# Patient Record
Sex: MALE | Race: BLACK OR AFRICAN AMERICAN | NOT HISPANIC OR LATINO | Employment: OTHER | URBAN - METROPOLITAN AREA
[De-identification: names, ages, dates, MRNs, and addresses within clinical notes are randomized per-mention and may not be internally consistent; named-entity substitution may affect disease eponyms.]

---

## 2019-02-07 ENCOUNTER — HOSPITAL ENCOUNTER (OUTPATIENT)
Dept: OTHER | Facility: HOSPITAL | Age: 64
Discharge: HOME | End: 2019-02-07
Attending: INTERNAL MEDICINE

## 2019-02-07 LAB — CONV HIV-1/ HIV-2: NEGATIVE

## 2019-03-05 ENCOUNTER — HOSPITAL ENCOUNTER (OUTPATIENT)
Dept: CT IMAGING | Facility: HOSPITAL | Age: 64
Discharge: HOME | End: 2019-03-05
Attending: INTERNAL MEDICINE

## 2019-03-05 LAB
CREAT BLD-MCNC: 0.6 MG/DL (ref 0.6–1.4)
GFR SERPLBLD BASED ON 1.73 SQ M-ARVRAT: >60 ML/MIN/{1.73_M2}

## 2019-03-14 ENCOUNTER — CONVERSION ENCOUNTER (OUTPATIENT)
Dept: GASTROENTEROLOGY | Facility: CLINIC | Age: 64
End: 2019-03-14

## 2019-03-14 ENCOUNTER — OFFICE VISIT CONVERTED (OUTPATIENT)
Dept: GASTROENTEROLOGY | Facility: CLINIC | Age: 64
End: 2019-03-14
Attending: NURSE PRACTITIONER

## 2019-03-22 ENCOUNTER — HOSPITAL ENCOUNTER (OUTPATIENT)
Dept: GASTROENTEROLOGY | Facility: HOSPITAL | Age: 64
Setting detail: HOSPITAL OUTPATIENT SURGERY
Discharge: HOME | End: 2019-03-22
Attending: INTERNAL MEDICINE

## 2019-05-09 ENCOUNTER — HOSPITAL ENCOUNTER (OUTPATIENT)
Dept: GASTROENTEROLOGY | Facility: HOSPITAL | Age: 64
Setting detail: HOSPITAL OUTPATIENT SURGERY
Discharge: HOME | End: 2019-05-09
Attending: INTERNAL MEDICINE

## 2019-08-15 ENCOUNTER — OFFICE VISIT CONVERTED (OUTPATIENT)
Dept: GASTROENTEROLOGY | Facility: CLINIC | Age: 64
End: 2019-08-15
Attending: NURSE PRACTITIONER

## 2019-08-22 ENCOUNTER — HOSPITAL ENCOUNTER (OUTPATIENT)
Dept: ULTRASOUND IMAGING | Facility: HOSPITAL | Age: 64
Discharge: HOME | End: 2019-08-22
Attending: NURSE PRACTITIONER

## 2019-08-22 LAB
FERRITIN SERPL-MCNC: 1020 NG/ML (ref 30–300)
INR PPP: 1.17 (ref 2–3)
IRON SATN MFR SERPL: 9 % (ref 20–55)
IRON SERPL-MCNC: 23 UG/DL (ref 70–180)
PROTHROMBIN TIME: 11.9 S (ref 9.4–12)
TIBC SERPL-MCNC: 266 UG/DL (ref 245–450)
TRANSFERRIN SERPL-MCNC: 186 MG/DL (ref 215–365)

## 2019-08-23 LAB
CONV HEPATITIS B SURFACE AG W CONFIRMATION RE: NEGATIVE
HAV IGM SERPL QL IA: NEGATIVE
HBV CORE IGM SERPL QL IA: NEGATIVE
HCV AB SER DONR QL: <0.1 S/CO RATIO (ref 0–0.9)

## 2019-09-05 ENCOUNTER — HOSPITAL ENCOUNTER (OUTPATIENT)
Dept: OTHER | Facility: HOSPITAL | Age: 64
Discharge: HOME | End: 2019-09-05
Attending: NURSE PRACTITIONER

## 2019-09-06 ENCOUNTER — OFFICE VISIT CONVERTED (OUTPATIENT)
Dept: OTOLARYNGOLOGY | Facility: CLINIC | Age: 64
End: 2019-09-06
Attending: OTOLARYNGOLOGY

## 2019-09-10 LAB — CONV HEMOCHROMATOSIS MUTATION (C282Y,H63D,565C): NORMAL

## 2019-11-21 ENCOUNTER — CONVERSION ENCOUNTER (OUTPATIENT)
Dept: GASTROENTEROLOGY | Facility: CLINIC | Age: 64
End: 2019-11-21

## 2019-11-21 ENCOUNTER — OFFICE VISIT CONVERTED (OUTPATIENT)
Dept: GASTROENTEROLOGY | Facility: CLINIC | Age: 64
End: 2019-11-21
Attending: NURSE PRACTITIONER

## 2020-02-04 ENCOUNTER — HOSPITAL ENCOUNTER (OUTPATIENT)
Dept: GASTROENTEROLOGY | Facility: HOSPITAL | Age: 65
Setting detail: HOSPITAL OUTPATIENT SURGERY
Discharge: HOME | End: 2020-02-04
Attending: INTERNAL MEDICINE

## 2020-12-31 ENCOUNTER — HOSPITAL ENCOUNTER (OUTPATIENT)
Dept: OTHER | Facility: HOSPITAL | Age: 65
Discharge: HOME | End: 2020-12-31
Attending: INTERNAL MEDICINE

## 2020-12-31 LAB
ALBUMIN SERPL-MCNC: 3 G/DL (ref 3.5–5)
ALBUMIN/GLOB SERPL: 0.6 {RATIO} (ref 1.4–2.6)
ALP SERPL-CCNC: 97 U/L (ref 56–155)
ALT SERPL-CCNC: 20 U/L (ref 10–40)
ANION GAP SERPL CALC-SCNC: 13 MMOL/L (ref 8–19)
AST SERPL-CCNC: 20 U/L (ref 15–50)
BASOPHILS # BLD AUTO: 0.05 10*3/UL (ref 0–0.2)
BASOPHILS NFR BLD AUTO: 0.4 % (ref 0–3)
BILIRUB SERPL-MCNC: 0.48 MG/DL (ref 0.2–1.3)
BUN SERPL-MCNC: 16 MG/DL (ref 5–25)
BUN/CREAT SERPL: 22 {RATIO} (ref 6–20)
CALCIUM SERPL-MCNC: 10.1 MG/DL (ref 8.7–10.4)
CHLORIDE SERPL-SCNC: 98 MMOL/L (ref 99–111)
CONV ABS IMM GRAN: 0.06 10*3/UL (ref 0–0.2)
CONV CO2: 26 MMOL/L (ref 22–32)
CONV IMMATURE GRAN: 0.5 % (ref 0–1.8)
CONV TOTAL PROTEIN: 8.1 G/DL (ref 6.3–8.2)
CREAT UR-MCNC: 0.73 MG/DL (ref 0.7–1.2)
DEPRECATED RDW RBC AUTO: 51.3 FL (ref 35.1–43.9)
EOSINOPHIL # BLD AUTO: 0.09 10*3/UL (ref 0–0.7)
EOSINOPHIL # BLD AUTO: 0.8 % (ref 0–7)
ERYTHROCYTE [DISTWIDTH] IN BLOOD BY AUTOMATED COUNT: 16.6 % (ref 11.6–14.4)
ERYTHROCYTE [SEDIMENTATION RATE] IN BLOOD: 87 MM/H (ref 0–20)
GFR SERPLBLD BASED ON 1.73 SQ M-ARVRAT: >60 ML/MIN/{1.73_M2}
GLOBULIN UR ELPH-MCNC: 5.1 G/DL (ref 2–3.5)
GLUCOSE SERPL-MCNC: 96 MG/DL (ref 70–99)
HCT VFR BLD AUTO: 36.2 % (ref 42–52)
HGB BLD-MCNC: 11.9 G/DL (ref 14–18)
LYMPHOCYTES # BLD AUTO: 2.28 10*3/UL (ref 1–5)
LYMPHOCYTES NFR BLD AUTO: 19.5 % (ref 20–45)
MCH RBC QN AUTO: 28.1 PG (ref 27–31)
MCHC RBC AUTO-ENTMCNC: 32.9 G/DL (ref 33–37)
MCV RBC AUTO: 85.4 FL (ref 80–96)
MONOCYTES # BLD AUTO: 0.18 10*3/UL (ref 0.2–1.2)
MONOCYTES NFR BLD AUTO: 1.5 % (ref 3–10)
NEUTROPHILS # BLD AUTO: 9.05 10*3/UL (ref 2–8)
NEUTROPHILS NFR BLD AUTO: 77.3 % (ref 30–85)
NRBC CBCN: 0 % (ref 0–0.7)
OSMOLALITY SERPL CALC.SUM OF ELEC: 277 MOSM/KG (ref 273–304)
PLATELET # BLD AUTO: 555 10*3/UL (ref 130–400)
PMV BLD AUTO: 8.4 FL (ref 9.4–12.4)
POTASSIUM SERPL-SCNC: 4.4 MMOL/L (ref 3.5–5.3)
RBC # BLD AUTO: 4.24 10*6/UL (ref 4.7–6.1)
SODIUM SERPL-SCNC: 133 MMOL/L (ref 135–147)
URATE SERPL-MCNC: 6.7 MG/DL (ref 3.5–8.5)
WBC # BLD AUTO: 11.71 10*3/UL (ref 4.8–10.8)

## 2021-01-05 LAB — CCP IGA+IGG SERPL IA-ACNC: 4 UNITS (ref 0–19)

## 2021-02-24 ENCOUNTER — HOSPITAL ENCOUNTER (OUTPATIENT)
Dept: LAB | Facility: HOSPITAL | Age: 66
Discharge: HOME | End: 2021-02-24
Attending: INTERNAL MEDICINE

## 2021-02-24 LAB
25(OH)D3 SERPL-MCNC: 49.4 NG/ML (ref 30–100)
ALBUMIN SERPL-MCNC: 2.7 G/DL (ref 3.5–5)
ALT SERPL-CCNC: 19 U/L (ref 10–40)
AST SERPL-CCNC: 29 U/L (ref 15–50)
BASOPHILS # BLD AUTO: 0.08 10*3/UL (ref 0–0.2)
BASOPHILS NFR BLD AUTO: 0.4 % (ref 0–3)
BUN SERPL-MCNC: 14 MG/DL (ref 5–25)
CONV ABS IMM GRAN: 0.23 10*3/UL (ref 0–0.2)
CONV IMMATURE GRAN: 1.1 % (ref 0–1.8)
CREAT UR-MCNC: 0.68 MG/DL (ref 0.7–1.2)
DEPRECATED RDW RBC AUTO: 51.3 FL (ref 35.1–43.9)
EOSINOPHIL # BLD AUTO: 0.08 10*3/UL (ref 0–0.7)
EOSINOPHIL # BLD AUTO: 0.4 % (ref 0–7)
ERYTHROCYTE [DISTWIDTH] IN BLOOD BY AUTOMATED COUNT: 15.8 % (ref 11.6–14.4)
ERYTHROCYTE [SEDIMENTATION RATE] IN BLOOD: 91 MM/H (ref 0–20)
HCT VFR BLD AUTO: 32.6 % (ref 42–52)
HGB BLD-MCNC: 10.4 G/DL (ref 14–18)
LYMPHOCYTES # BLD AUTO: 2.59 10*3/UL (ref 1–5)
LYMPHOCYTES NFR BLD AUTO: 12.5 % (ref 20–45)
MCH RBC QN AUTO: 28.3 PG (ref 27–31)
MCHC RBC AUTO-ENTMCNC: 31.9 G/DL (ref 33–37)
MCV RBC AUTO: 88.8 FL (ref 80–96)
MONOCYTES # BLD AUTO: 0.52 10*3/UL (ref 0.2–1.2)
MONOCYTES NFR BLD AUTO: 2.5 % (ref 3–10)
NEUTROPHILS # BLD AUTO: 17.17 10*3/UL (ref 2–8)
NEUTROPHILS NFR BLD AUTO: 83.1 % (ref 30–85)
NRBC CBCN: 0 % (ref 0–0.7)
PLATELET # BLD AUTO: 681 10*3/UL (ref 130–400)
PMV BLD AUTO: 8.8 FL (ref 9.4–12.4)
RBC # BLD AUTO: 3.67 10*6/UL (ref 4.7–6.1)
WBC # BLD AUTO: 20.67 10*3/UL (ref 4.8–10.8)

## 2021-03-02 LAB
CONV QUANTIFERON TB GOLD: NEGATIVE
QUANTIFERON CRITERIA: NORMAL
QUANTIFERON MITOGEN VALUE: 2.76 IU/ML
QUANTIFERON NIL VALUE: 0.02 IU/ML
QUANTIFERON TB1 AG VALUE: 0.03 IU/ML
QUANTIFERON TB2 AG VALUE: 0.02 IU/ML

## 2021-03-17 ENCOUNTER — HOSPITAL ENCOUNTER (OUTPATIENT)
Dept: VACCINE CLINIC | Facility: HOSPITAL | Age: 66
Discharge: HOME | End: 2021-03-17
Attending: INTERNAL MEDICINE

## 2021-04-07 ENCOUNTER — HOSPITAL ENCOUNTER (OUTPATIENT)
Dept: VACCINE CLINIC | Facility: HOSPITAL | Age: 66
Discharge: HOME | End: 2021-04-07
Attending: INTERNAL MEDICINE

## 2021-05-15 VITALS
SYSTOLIC BLOOD PRESSURE: 97 MMHG | WEIGHT: 122.25 LBS | HEIGHT: 68 IN | DIASTOLIC BLOOD PRESSURE: 66 MMHG | HEART RATE: 88 BPM | BODY MASS INDEX: 18.53 KG/M2

## 2021-05-15 VITALS
DIASTOLIC BLOOD PRESSURE: 63 MMHG | SYSTOLIC BLOOD PRESSURE: 104 MMHG | WEIGHT: 134 LBS | HEART RATE: 85 BPM | BODY MASS INDEX: 20.31 KG/M2 | HEIGHT: 68 IN

## 2021-05-15 VITALS
TEMPERATURE: 97.2 F | DIASTOLIC BLOOD PRESSURE: 70 MMHG | HEART RATE: 90 BPM | OXYGEN SATURATION: 98 % | WEIGHT: 119.5 LBS | HEIGHT: 68 IN | RESPIRATION RATE: 16 BRPM | BODY MASS INDEX: 18.11 KG/M2 | SYSTOLIC BLOOD PRESSURE: 100 MMHG

## 2021-05-16 VITALS — SYSTOLIC BLOOD PRESSURE: 101 MMHG | WEIGHT: 130 LBS | DIASTOLIC BLOOD PRESSURE: 59 MMHG | HEART RATE: 100 BPM

## 2021-05-17 ENCOUNTER — HOSPITAL ENCOUNTER (OUTPATIENT)
Dept: LAB | Facility: HOSPITAL | Age: 66
Discharge: HOME | End: 2021-05-17
Attending: INTERNAL MEDICINE

## 2021-05-17 LAB
ALBUMIN SERPL-MCNC: 2.9 G/DL (ref 3.5–5)
ALT SERPL-CCNC: 23 U/L (ref 10–40)
AST SERPL-CCNC: 28 U/L (ref 15–50)
BASOPHILS # BLD AUTO: 0.05 10*3/UL (ref 0–0.2)
BASOPHILS NFR BLD AUTO: 0.3 % (ref 0–3)
BUN SERPL-MCNC: 11 MG/DL (ref 5–25)
CONV ABS IMM GRAN: 0.14 10*3/UL (ref 0–0.2)
CONV IMMATURE GRAN: 0.9 % (ref 0–1.8)
CREAT UR-MCNC: 0.67 MG/DL (ref 0.7–1.2)
CRP SERPL-MCNC: 110.7 MG/L (ref 0–5)
DEPRECATED RDW RBC AUTO: 50.9 FL (ref 35.1–43.9)
EOSINOPHIL # BLD AUTO: 0.05 10*3/UL (ref 0–0.7)
EOSINOPHIL # BLD AUTO: 0.3 % (ref 0–7)
ERYTHROCYTE [DISTWIDTH] IN BLOOD BY AUTOMATED COUNT: 15.9 % (ref 11.6–14.4)
ERYTHROCYTE [SEDIMENTATION RATE] IN BLOOD: 100 MM/H (ref 0–20)
HCT VFR BLD AUTO: 29.9 % (ref 42–52)
HGB BLD-MCNC: 9.5 G/DL (ref 14–18)
LYMPHOCYTES # BLD AUTO: 3.89 10*3/UL (ref 1–5)
LYMPHOCYTES NFR BLD AUTO: 24.7 % (ref 20–45)
MCH RBC QN AUTO: 27.6 PG (ref 27–31)
MCHC RBC AUTO-ENTMCNC: 31.8 G/DL (ref 33–37)
MCV RBC AUTO: 86.9 FL (ref 80–96)
MONOCYTES # BLD AUTO: 0.39 10*3/UL (ref 0.2–1.2)
MONOCYTES NFR BLD AUTO: 2.5 % (ref 3–10)
NEUTROPHILS # BLD AUTO: 11.25 10*3/UL (ref 2–8)
NEUTROPHILS NFR BLD AUTO: 71.3 % (ref 30–85)
NRBC CBCN: 0 % (ref 0–0.7)
PLATELET # BLD AUTO: 664 10*3/UL (ref 130–400)
PMV BLD AUTO: 8.6 FL (ref 9.4–12.4)
RBC # BLD AUTO: 3.44 10*6/UL (ref 4.7–6.1)
WBC # BLD AUTO: 15.77 10*3/UL (ref 4.8–10.8)

## 2021-09-02 ENCOUNTER — TRANSCRIBE ORDERS (OUTPATIENT)
Dept: LAB | Facility: HOSPITAL | Age: 66
End: 2021-09-02

## 2021-09-02 ENCOUNTER — LAB (OUTPATIENT)
Dept: LAB | Facility: HOSPITAL | Age: 66
End: 2021-09-02

## 2021-09-02 DIAGNOSIS — Z79.899 ENCOUNTER FOR LONG-TERM (CURRENT) USE OF OTHER MEDICATIONS: ICD-10-CM

## 2021-09-02 DIAGNOSIS — M13.0 POLYARTHROPATHY: Primary | ICD-10-CM

## 2021-09-02 DIAGNOSIS — M13.0 POLYARTHROPATHY: ICD-10-CM

## 2021-09-02 LAB
ALT SERPL W P-5'-P-CCNC: 16 U/L (ref 1–41)
AST SERPL-CCNC: 15 U/L (ref 1–40)
BASOPHILS # BLD AUTO: 0.06 10*3/MM3 (ref 0–0.2)
BASOPHILS NFR BLD AUTO: 0.6 % (ref 0–1.5)
BUN SERPL-MCNC: 8 MG/DL (ref 8–23)
CREAT SERPL-MCNC: 0.66 MG/DL (ref 0.76–1.27)
CRP SERPL-MCNC: 9.23 MG/DL (ref 0.01–0.5)
DEPRECATED RDW RBC AUTO: 45.4 FL (ref 37–54)
EOSINOPHIL # BLD AUTO: 0.11 10*3/MM3 (ref 0–0.4)
EOSINOPHIL NFR BLD AUTO: 1.1 % (ref 0.3–6.2)
ERYTHROCYTE [DISTWIDTH] IN BLOOD BY AUTOMATED COUNT: 14.6 % (ref 12.3–15.4)
FERRITIN SERPL-MCNC: 561 NG/ML (ref 30–400)
FOLATE SERPL-MCNC: 10.7 NG/ML (ref 4.78–24.2)
GFR SERPL CREATININE-BSD FRML MDRD: 146 ML/MIN/1.73
HCT VFR BLD AUTO: 33.4 % (ref 37.5–51)
HGB BLD-MCNC: 11.2 G/DL (ref 13–17.7)
IMM GRANULOCYTES # BLD AUTO: 0.05 10*3/MM3 (ref 0–0.05)
IMM GRANULOCYTES NFR BLD AUTO: 0.5 % (ref 0–0.5)
LYMPHOCYTES # BLD AUTO: 2.22 10*3/MM3 (ref 0.7–3.1)
LYMPHOCYTES NFR BLD AUTO: 21.4 % (ref 19.6–45.3)
MCH RBC QN AUTO: 28.7 PG (ref 26.6–33)
MCHC RBC AUTO-ENTMCNC: 33.5 G/DL (ref 31.5–35.7)
MCV RBC AUTO: 85.6 FL (ref 79–97)
MONOCYTES # BLD AUTO: 0.42 10*3/MM3 (ref 0.1–0.9)
MONOCYTES NFR BLD AUTO: 4 % (ref 5–12)
NEUTROPHILS NFR BLD AUTO: 7.53 10*3/MM3 (ref 1.7–7)
NEUTROPHILS NFR BLD AUTO: 72.4 % (ref 42.7–76)
NRBC BLD AUTO-RTO: 0 /100 WBC (ref 0–0.2)
PLATELET # BLD AUTO: 571 10*3/MM3 (ref 140–450)
PMV BLD AUTO: 8.8 FL (ref 6–12)
RBC # BLD AUTO: 3.9 10*6/MM3 (ref 4.14–5.8)
URATE SERPL-MCNC: 7.2 MG/DL (ref 3.4–7)
VIT B12 BLD-MCNC: 729 PG/ML (ref 211–946)
WBC # BLD AUTO: 10.39 10*3/MM3 (ref 3.4–10.8)

## 2021-09-02 PROCEDURE — 84450 TRANSFERASE (AST) (SGOT): CPT

## 2021-09-02 PROCEDURE — 84550 ASSAY OF BLOOD/URIC ACID: CPT

## 2021-09-02 PROCEDURE — 82565 ASSAY OF CREATININE: CPT

## 2021-09-02 PROCEDURE — 85025 COMPLETE CBC W/AUTO DIFF WBC: CPT

## 2021-09-02 PROCEDURE — 86141 C-REACTIVE PROTEIN HS: CPT

## 2021-09-02 PROCEDURE — 82746 ASSAY OF FOLIC ACID SERUM: CPT

## 2021-09-02 PROCEDURE — 82728 ASSAY OF FERRITIN: CPT

## 2021-09-02 PROCEDURE — 84520 ASSAY OF UREA NITROGEN: CPT

## 2021-09-02 PROCEDURE — 36415 COLL VENOUS BLD VENIPUNCTURE: CPT

## 2021-09-02 PROCEDURE — 82607 VITAMIN B-12: CPT

## 2021-09-02 PROCEDURE — 84460 ALANINE AMINO (ALT) (SGPT): CPT

## 2022-01-04 ENCOUNTER — LAB (OUTPATIENT)
Dept: LAB | Facility: HOSPITAL | Age: 67
End: 2022-01-04

## 2022-01-04 ENCOUNTER — TRANSCRIBE ORDERS (OUTPATIENT)
Dept: LAB | Facility: HOSPITAL | Age: 67
End: 2022-01-04

## 2022-01-04 DIAGNOSIS — E55.9 VITAMIN D DEFICIENCY: ICD-10-CM

## 2022-01-04 DIAGNOSIS — M25.50 POLYARTHRALGIA: ICD-10-CM

## 2022-01-04 DIAGNOSIS — E55.9 VITAMIN D DEFICIENCY: Primary | ICD-10-CM

## 2022-01-04 DIAGNOSIS — M05.79 SEROPOSITIVE RHEUMATOID ARTHRITIS OF MULTIPLE SITES: ICD-10-CM

## 2022-01-04 DIAGNOSIS — Z79.899 ENCOUNTER FOR LONG-TERM (CURRENT) USE OF OTHER MEDICATIONS: ICD-10-CM

## 2022-01-04 LAB
25(OH)D3 SERPL-MCNC: 26.1 NG/ML
ALBUMIN SERPL-MCNC: 3.1 G/DL (ref 3.5–5.2)
ALT SERPL W P-5'-P-CCNC: 10 U/L (ref 1–41)
AST SERPL-CCNC: 14 U/L (ref 1–40)
BASOPHILS # BLD AUTO: 0.06 10*3/MM3 (ref 0–0.2)
BASOPHILS NFR BLD AUTO: 0.5 % (ref 0–1.5)
BUN SERPL-MCNC: 10 MG/DL (ref 8–23)
CREAT SERPL-MCNC: 0.62 MG/DL (ref 0.76–1.27)
DEPRECATED RDW RBC AUTO: 46.6 FL (ref 37–54)
EOSINOPHIL # BLD AUTO: 0.12 10*3/MM3 (ref 0–0.4)
EOSINOPHIL NFR BLD AUTO: 0.9 % (ref 0.3–6.2)
ERYTHROCYTE [DISTWIDTH] IN BLOOD BY AUTOMATED COUNT: 14.5 % (ref 12.3–15.4)
ERYTHROCYTE [SEDIMENTATION RATE] IN BLOOD: >130 MM/HR (ref 0–20)
GFR SERPL CREATININE-BSD FRML MDRD: >150 ML/MIN/1.73
HCT VFR BLD AUTO: 34.6 % (ref 37.5–51)
HGB BLD-MCNC: 11.3 G/DL (ref 13–17.7)
IMM GRANULOCYTES # BLD AUTO: 0.09 10*3/MM3 (ref 0–0.05)
IMM GRANULOCYTES NFR BLD AUTO: 0.7 % (ref 0–0.5)
LYMPHOCYTES # BLD AUTO: 3.36 10*3/MM3 (ref 0.7–3.1)
LYMPHOCYTES NFR BLD AUTO: 25.4 % (ref 19.6–45.3)
MCH RBC QN AUTO: 29.1 PG (ref 26.6–33)
MCHC RBC AUTO-ENTMCNC: 32.7 G/DL (ref 31.5–35.7)
MCV RBC AUTO: 89.2 FL (ref 79–97)
MONOCYTES # BLD AUTO: 0.36 10*3/MM3 (ref 0.1–0.9)
MONOCYTES NFR BLD AUTO: 2.7 % (ref 5–12)
NEUTROPHILS NFR BLD AUTO: 69.8 % (ref 42.7–76)
NEUTROPHILS NFR BLD AUTO: 9.22 10*3/MM3 (ref 1.7–7)
NRBC BLD AUTO-RTO: 0 /100 WBC (ref 0–0.2)
PLATELET # BLD AUTO: 559 10*3/MM3 (ref 140–450)
PMV BLD AUTO: 9.1 FL (ref 6–12)
RBC # BLD AUTO: 3.88 10*6/MM3 (ref 4.14–5.8)
WBC NRBC COR # BLD: 13.21 10*3/MM3 (ref 3.4–10.8)

## 2022-01-04 PROCEDURE — 84520 ASSAY OF UREA NITROGEN: CPT

## 2022-01-04 PROCEDURE — 36415 COLL VENOUS BLD VENIPUNCTURE: CPT

## 2022-01-04 PROCEDURE — 82306 VITAMIN D 25 HYDROXY: CPT

## 2022-01-04 PROCEDURE — 82040 ASSAY OF SERUM ALBUMIN: CPT

## 2022-01-04 PROCEDURE — 85652 RBC SED RATE AUTOMATED: CPT

## 2022-01-04 PROCEDURE — 82565 ASSAY OF CREATININE: CPT

## 2022-01-04 PROCEDURE — 84460 ALANINE AMINO (ALT) (SGPT): CPT

## 2022-01-04 PROCEDURE — 85025 COMPLETE CBC W/AUTO DIFF WBC: CPT

## 2022-01-04 PROCEDURE — 84450 TRANSFERASE (AST) (SGOT): CPT

## 2022-03-26 ENCOUNTER — HOSPITAL ENCOUNTER (INPATIENT)
Facility: HOSPITAL | Age: 67
LOS: 5 days | Discharge: HOME | End: 2022-03-31
Attending: EMERGENCY MEDICINE | Admitting: INTERNAL MEDICINE

## 2022-03-26 ENCOUNTER — APPOINTMENT (OUTPATIENT)
Dept: GENERAL RADIOLOGY | Facility: HOSPITAL | Age: 67
End: 2022-03-26

## 2022-03-26 ENCOUNTER — APPOINTMENT (OUTPATIENT)
Dept: CT IMAGING | Facility: HOSPITAL | Age: 67
End: 2022-03-26

## 2022-03-26 DIAGNOSIS — I50.9 ACUTE ON CHRONIC CONGESTIVE HEART FAILURE, UNSPECIFIED HEART FAILURE TYPE: ICD-10-CM

## 2022-03-26 DIAGNOSIS — Z78.9 DECREASED ACTIVITIES OF DAILY LIVING (ADL): ICD-10-CM

## 2022-03-26 DIAGNOSIS — R10.13 EPIGASTRIC PAIN: ICD-10-CM

## 2022-03-26 DIAGNOSIS — R26.2 DIFFICULTY WALKING: ICD-10-CM

## 2022-03-26 DIAGNOSIS — R00.0 SINUS TACHYCARDIA SEEN ON CARDIAC MONITOR: ICD-10-CM

## 2022-03-26 DIAGNOSIS — J18.9 PNEUMONIA OF BOTH LUNGS DUE TO INFECTIOUS ORGANISM, UNSPECIFIED PART OF LUNG: ICD-10-CM

## 2022-03-26 DIAGNOSIS — R07.9 CHEST PAIN, UNSPECIFIED TYPE: Primary | ICD-10-CM

## 2022-03-26 DIAGNOSIS — R13.14 PHARYNGOESOPHAGEAL DYSPHAGIA: ICD-10-CM

## 2022-03-26 PROBLEM — J44.1 COPD EXACERBATION: Status: ACTIVE | Noted: 2022-03-26

## 2022-03-26 LAB
ALBUMIN SERPL-MCNC: 3 G/DL (ref 3.5–5.2)
ALBUMIN/GLOB SERPL: 0.6 G/DL
ALP SERPL-CCNC: 80 U/L (ref 39–117)
ALT SERPL W P-5'-P-CCNC: 6 U/L (ref 1–41)
ANION GAP SERPL CALCULATED.3IONS-SCNC: 10.8 MMOL/L (ref 5–15)
AST SERPL-CCNC: 13 U/L (ref 1–40)
BASOPHILS # BLD AUTO: 0.07 10*3/MM3 (ref 0–0.2)
BASOPHILS NFR BLD AUTO: 0.6 % (ref 0–1.5)
BILIRUB SERPL-MCNC: 0.3 MG/DL (ref 0–1.2)
BUN SERPL-MCNC: 7 MG/DL (ref 8–23)
BUN/CREAT SERPL: 8.9 (ref 7–25)
CALCIUM SPEC-SCNC: 9.1 MG/DL (ref 8.6–10.5)
CHLORIDE SERPL-SCNC: 101 MMOL/L (ref 98–107)
CO2 SERPL-SCNC: 22.2 MMOL/L (ref 22–29)
CREAT SERPL-MCNC: 0.79 MG/DL (ref 0.76–1.27)
D-LACTATE SERPL-SCNC: 1.8 MMOL/L (ref 0.5–2)
D-LACTATE SERPL-SCNC: 2.4 MMOL/L (ref 0.5–2)
DEPRECATED RDW RBC AUTO: 52.8 FL (ref 37–54)
EGFRCR SERPLBLD CKD-EPI 2021: 97.4 ML/MIN/1.73
EOSINOPHIL # BLD AUTO: 0.1 10*3/MM3 (ref 0–0.4)
EOSINOPHIL NFR BLD AUTO: 0.9 % (ref 0.3–6.2)
ERYTHROCYTE [DISTWIDTH] IN BLOOD BY AUTOMATED COUNT: 16 % (ref 12.3–15.4)
FLUAV AG NPH QL: NEGATIVE
FLUBV AG NPH QL IA: NEGATIVE
GLOBULIN UR ELPH-MCNC: 5.3 GM/DL
GLUCOSE SERPL-MCNC: 113 MG/DL (ref 65–99)
HCT VFR BLD AUTO: 30.8 % (ref 37.5–51)
HGB BLD-MCNC: 10.3 G/DL (ref 13–17.7)
HOLD SPECIMEN: NORMAL
HOLD SPECIMEN: NORMAL
IMM GRANULOCYTES # BLD AUTO: 0.07 10*3/MM3 (ref 0–0.05)
IMM GRANULOCYTES NFR BLD AUTO: 0.6 % (ref 0–0.5)
LIPASE SERPL-CCNC: 10 U/L (ref 13–60)
LYMPHOCYTES # BLD AUTO: 2.68 10*3/MM3 (ref 0.7–3.1)
LYMPHOCYTES NFR BLD AUTO: 23.1 % (ref 19.6–45.3)
MCH RBC QN AUTO: 30.6 PG (ref 26.6–33)
MCHC RBC AUTO-ENTMCNC: 33.4 G/DL (ref 31.5–35.7)
MCV RBC AUTO: 91.4 FL (ref 79–97)
MONOCYTES # BLD AUTO: 0.7 10*3/MM3 (ref 0.1–0.9)
MONOCYTES NFR BLD AUTO: 6 % (ref 5–12)
NEUTROPHILS NFR BLD AUTO: 68.8 % (ref 42.7–76)
NEUTROPHILS NFR BLD AUTO: 7.97 10*3/MM3 (ref 1.7–7)
NRBC BLD AUTO-RTO: 0 /100 WBC (ref 0–0.2)
NT-PROBNP SERPL-MCNC: 4319 PG/ML (ref 0–900)
PLATELET # BLD AUTO: 493 10*3/MM3 (ref 140–450)
PMV BLD AUTO: 8.3 FL (ref 6–12)
POTASSIUM SERPL-SCNC: 5 MMOL/L (ref 3.5–5.2)
PROT SERPL-MCNC: 8.3 G/DL (ref 6–8.5)
RBC # BLD AUTO: 3.37 10*6/MM3 (ref 4.14–5.8)
SARS-COV-2 RNA PNL SPEC NAA+PROBE: NOT DETECTED
SODIUM SERPL-SCNC: 134 MMOL/L (ref 136–145)
TROPONIN I SERPL-MCNC: 0.05 NG/ML (ref 0–0.6)
TROPONIN T SERPL-MCNC: 0.04 NG/ML (ref 0–0.03)
TROPONIN T SERPL-MCNC: 0.04 NG/ML (ref 0–0.03)
WBC NRBC COR # BLD: 11.59 10*3/MM3 (ref 3.4–10.8)
WHOLE BLOOD HOLD SPECIMEN: NORMAL
WHOLE BLOOD HOLD SPECIMEN: NORMAL

## 2022-03-26 PROCEDURE — 87070 CULTURE OTHR SPECIMN AEROBIC: CPT | Performed by: INTERNAL MEDICINE

## 2022-03-26 PROCEDURE — 87040 BLOOD CULTURE FOR BACTERIA: CPT | Performed by: EMERGENCY MEDICINE

## 2022-03-26 PROCEDURE — 99284 EMERGENCY DEPT VISIT MOD MDM: CPT

## 2022-03-26 PROCEDURE — 25010000002 CEFEPIME PER 500 MG: Performed by: EMERGENCY MEDICINE

## 2022-03-26 PROCEDURE — 83605 ASSAY OF LACTIC ACID: CPT | Performed by: EMERGENCY MEDICINE

## 2022-03-26 PROCEDURE — 93005 ELECTROCARDIOGRAM TRACING: CPT | Performed by: EMERGENCY MEDICINE

## 2022-03-26 PROCEDURE — 80053 COMPREHEN METABOLIC PANEL: CPT | Performed by: EMERGENCY MEDICINE

## 2022-03-26 PROCEDURE — 84484 ASSAY OF TROPONIN QUANT: CPT

## 2022-03-26 PROCEDURE — 84484 ASSAY OF TROPONIN QUANT: CPT | Performed by: EMERGENCY MEDICINE

## 2022-03-26 PROCEDURE — 25010000002 ENOXAPARIN PER 10 MG: Performed by: SPECIALIST

## 2022-03-26 PROCEDURE — 71250 CT THORAX DX C-: CPT

## 2022-03-26 PROCEDURE — 94640 AIRWAY INHALATION TREATMENT: CPT

## 2022-03-26 PROCEDURE — 74176 CT ABD & PELVIS W/O CONTRAST: CPT

## 2022-03-26 PROCEDURE — 94760 N-INVAS EAR/PLS OXIMETRY 1: CPT

## 2022-03-26 PROCEDURE — 93005 ELECTROCARDIOGRAM TRACING: CPT

## 2022-03-26 PROCEDURE — 94799 UNLISTED PULMONARY SVC/PX: CPT

## 2022-03-26 PROCEDURE — 71045 X-RAY EXAM CHEST 1 VIEW: CPT

## 2022-03-26 PROCEDURE — 93010 ELECTROCARDIOGRAM REPORT: CPT | Performed by: INTERNAL MEDICINE

## 2022-03-26 PROCEDURE — 36415 COLL VENOUS BLD VENIPUNCTURE: CPT

## 2022-03-26 PROCEDURE — 63710000001 PREDNISONE PER 1 MG: Performed by: INTERNAL MEDICINE

## 2022-03-26 PROCEDURE — 25010000002 VANCOMYCIN 5 G RECONSTITUTED SOLUTION: Performed by: EMERGENCY MEDICINE

## 2022-03-26 PROCEDURE — 83690 ASSAY OF LIPASE: CPT | Performed by: EMERGENCY MEDICINE

## 2022-03-26 PROCEDURE — 87205 SMEAR GRAM STAIN: CPT | Performed by: INTERNAL MEDICINE

## 2022-03-26 PROCEDURE — U0004 COV-19 TEST NON-CDC HGH THRU: HCPCS | Performed by: EMERGENCY MEDICINE

## 2022-03-26 PROCEDURE — 83880 ASSAY OF NATRIURETIC PEPTIDE: CPT | Performed by: EMERGENCY MEDICINE

## 2022-03-26 PROCEDURE — 85025 COMPLETE CBC W/AUTO DIFF WBC: CPT

## 2022-03-26 PROCEDURE — 87804 INFLUENZA ASSAY W/OPTIC: CPT | Performed by: INTERNAL MEDICINE

## 2022-03-26 RX ORDER — IPRATROPIUM BROMIDE AND ALBUTEROL SULFATE 2.5; .5 MG/3ML; MG/3ML
3 SOLUTION RESPIRATORY (INHALATION) EVERY 4 HOURS PRN
Status: DISCONTINUED | OUTPATIENT
Start: 2022-03-26 | End: 2022-03-31 | Stop reason: HOSPADM

## 2022-03-26 RX ORDER — GUAIFENESIN 600 MG/1
1200 TABLET, EXTENDED RELEASE ORAL EVERY 12 HOURS SCHEDULED
Status: DISCONTINUED | OUTPATIENT
Start: 2022-03-26 | End: 2022-03-31 | Stop reason: HOSPADM

## 2022-03-26 RX ORDER — BENZONATATE 100 MG/1
200 CAPSULE ORAL 3 TIMES DAILY PRN
Status: DISCONTINUED | OUTPATIENT
Start: 2022-03-26 | End: 2022-03-31 | Stop reason: HOSPADM

## 2022-03-26 RX ORDER — HYDROCODONE BITARTRATE AND ACETAMINOPHEN 5; 325 MG/1; MG/1
TABLET ORAL
COMMUNITY
End: 2022-03-26

## 2022-03-26 RX ORDER — PREDNISONE 20 MG/1
40 TABLET ORAL
Status: COMPLETED | OUTPATIENT
Start: 2022-03-26 | End: 2022-03-30

## 2022-03-26 RX ORDER — FOLIC ACID 1 MG/1
1 TABLET ORAL DAILY
Status: DISCONTINUED | OUTPATIENT
Start: 2022-03-27 | End: 2022-03-31 | Stop reason: HOSPADM

## 2022-03-26 RX ORDER — ASPIRIN 81 MG/1
81 TABLET ORAL DAILY
COMMUNITY

## 2022-03-26 RX ORDER — TRAZODONE HYDROCHLORIDE 50 MG/1
50 TABLET ORAL NIGHTLY PRN
Status: DISCONTINUED | OUTPATIENT
Start: 2022-03-26 | End: 2022-03-31 | Stop reason: HOSPADM

## 2022-03-26 RX ORDER — CEFEPIME 1 G/50ML
2 INJECTION, SOLUTION INTRAVENOUS ONCE
Status: COMPLETED | OUTPATIENT
Start: 2022-03-26 | End: 2022-03-26

## 2022-03-26 RX ORDER — SODIUM CHLORIDE 0.9 % (FLUSH) 0.9 %
10 SYRINGE (ML) INJECTION AS NEEDED
Status: DISCONTINUED | OUTPATIENT
Start: 2022-03-26 | End: 2022-03-26

## 2022-03-26 RX ORDER — FAMOTIDINE 20 MG/1
TABLET, FILM COATED ORAL EVERY 12 HOURS SCHEDULED
COMMUNITY
End: 2022-08-11

## 2022-03-26 RX ORDER — DOXYCYCLINE 100 MG/1
100 CAPSULE ORAL EVERY 12 HOURS SCHEDULED
Status: DISCONTINUED | OUTPATIENT
Start: 2022-03-26 | End: 2022-03-27

## 2022-03-26 RX ORDER — OXYCODONE AND ACETAMINOPHEN 7.5; 325 MG/1; MG/1
1 TABLET ORAL 2 TIMES DAILY PRN
Status: DISCONTINUED | OUTPATIENT
Start: 2022-03-26 | End: 2022-03-31 | Stop reason: HOSPADM

## 2022-03-26 RX ORDER — FAMOTIDINE 20 MG/1
20 TABLET, FILM COATED ORAL DAILY
Status: DISCONTINUED | OUTPATIENT
Start: 2022-03-27 | End: 2022-03-31 | Stop reason: HOSPADM

## 2022-03-26 RX ORDER — FOLIC ACID 1 MG/1
1 TABLET ORAL DAILY
COMMUNITY
Start: 2021-12-20

## 2022-03-26 RX ORDER — BUDESONIDE 0.5 MG/2ML
0.5 INHALANT ORAL
Status: DISCONTINUED | OUTPATIENT
Start: 2022-03-26 | End: 2022-03-31 | Stop reason: HOSPADM

## 2022-03-26 RX ORDER — LACTULOSE 10 G/15ML
30 SOLUTION ORAL
COMMUNITY
End: 2022-03-26

## 2022-03-26 RX ORDER — OXYCODONE AND ACETAMINOPHEN 7.5; 325 MG/1; MG/1
1 TABLET ORAL 2 TIMES DAILY PRN
COMMUNITY
Start: 2022-02-14

## 2022-03-26 RX ORDER — MIRTAZAPINE 15 MG/1
TABLET, FILM COATED ORAL
COMMUNITY
End: 2022-03-26

## 2022-03-26 RX ORDER — IPRATROPIUM BROMIDE AND ALBUTEROL SULFATE 2.5; .5 MG/3ML; MG/3ML
3 SOLUTION RESPIRATORY (INHALATION)
Status: DISCONTINUED | OUTPATIENT
Start: 2022-03-26 | End: 2022-03-31

## 2022-03-26 RX ORDER — ETANERCEPT 50 MG/ML
50 SOLUTION SUBCUTANEOUS
COMMUNITY

## 2022-03-26 RX ORDER — METOPROLOL SUCCINATE 25 MG/1
25 TABLET, EXTENDED RELEASE ORAL DAILY
Status: DISCONTINUED | OUTPATIENT
Start: 2022-03-27 | End: 2022-03-27

## 2022-03-26 RX ORDER — CEFTRIAXONE SODIUM 1 G/50ML
1 INJECTION, SOLUTION INTRAVENOUS EVERY 24 HOURS
Status: DISCONTINUED | OUTPATIENT
Start: 2022-03-27 | End: 2022-03-31 | Stop reason: HOSPADM

## 2022-03-26 RX ORDER — ARFORMOTEROL TARTRATE 15 UG/2ML
15 SOLUTION RESPIRATORY (INHALATION)
Status: DISCONTINUED | OUTPATIENT
Start: 2022-03-26 | End: 2022-03-31 | Stop reason: HOSPADM

## 2022-03-26 RX ORDER — TESTOSTERONE ENANTHATE 200 MG/ML
200 INJECTION, SOLUTION INTRAMUSCULAR
COMMUNITY

## 2022-03-26 RX ORDER — ASPIRIN 81 MG/1
81 TABLET ORAL DAILY
Status: DISCONTINUED | OUTPATIENT
Start: 2022-03-27 | End: 2022-03-31 | Stop reason: HOSPADM

## 2022-03-26 RX ORDER — SODIUM CHLORIDE 0.9 % (FLUSH) 0.9 %
10 SYRINGE (ML) INJECTION AS NEEDED
Status: DISCONTINUED | OUTPATIENT
Start: 2022-03-26 | End: 2022-03-27

## 2022-03-26 RX ORDER — ISOSORBIDE MONONITRATE 30 MG/1
TABLET, EXTENDED RELEASE ORAL
COMMUNITY
Start: 2022-02-15 | End: 2022-03-26

## 2022-03-26 RX ORDER — MELOXICAM 15 MG/1
TABLET ORAL
COMMUNITY
End: 2022-03-26

## 2022-03-26 RX ORDER — METOPROLOL SUCCINATE 25 MG/1
50 TABLET, EXTENDED RELEASE ORAL DAILY
Status: ON HOLD | COMMUNITY
End: 2022-09-23 | Stop reason: SDUPTHER

## 2022-03-26 RX ADMIN — BUDESONIDE 0.5 MG: 0.5 SUSPENSION RESPIRATORY (INHALATION) at 23:27

## 2022-03-26 RX ADMIN — GUAIFENESIN 1200 MG: 600 TABLET ORAL at 21:13

## 2022-03-26 RX ADMIN — SODIUM CHLORIDE 1000 ML: 9 INJECTION, SOLUTION INTRAVENOUS at 13:42

## 2022-03-26 RX ADMIN — IPRATROPIUM BROMIDE AND ALBUTEROL SULFATE 3 ML: 2.5; .5 SOLUTION RESPIRATORY (INHALATION) at 23:27

## 2022-03-26 RX ADMIN — PREDNISONE 40 MG: 20 TABLET ORAL at 21:12

## 2022-03-26 RX ADMIN — ARFORMOTEROL TARTRATE 15 MCG: 15 SOLUTION RESPIRATORY (INHALATION) at 23:27

## 2022-03-26 RX ADMIN — ENOXAPARIN SODIUM 60 MG: 60 INJECTION SUBCUTANEOUS at 21:13

## 2022-03-26 RX ADMIN — BENZONATATE 200 MG: 100 CAPSULE ORAL at 22:49

## 2022-03-26 RX ADMIN — CEFEPIME 2 G: 1 INJECTION, SOLUTION INTRAVENOUS at 13:43

## 2022-03-26 RX ADMIN — TRAZODONE HYDROCHLORIDE 50 MG: 50 TABLET ORAL at 22:49

## 2022-03-26 RX ADMIN — METOPROLOL TARTRATE 5 MG: 1 INJECTION, SOLUTION INTRAVENOUS at 16:17

## 2022-03-26 RX ADMIN — VANCOMYCIN HYDROCHLORIDE 1250 MG: 5 INJECTION, POWDER, LYOPHILIZED, FOR SOLUTION INTRAVENOUS at 14:32

## 2022-03-27 ENCOUNTER — APPOINTMENT (OUTPATIENT)
Dept: CARDIOLOGY | Facility: HOSPITAL | Age: 67
End: 2022-03-27

## 2022-03-27 LAB
ANION GAP SERPL CALCULATED.3IONS-SCNC: 9.8 MMOL/L (ref 5–15)
BACTERIA UR QL AUTO: ABNORMAL /HPF
BASOPHILS # BLD AUTO: 0.02 10*3/MM3 (ref 0–0.2)
BASOPHILS NFR BLD AUTO: 0.2 % (ref 0–1.5)
BH CV ECHO MEAS - AO ROOT DIAM: 4 CM
BH CV ECHO MEAS - EDV(MOD-SP2): 88 ML
BH CV ECHO MEAS - EDV(MOD-SP4): 87 ML
BH CV ECHO MEAS - EF(MOD-BP): 51.3 %
BH CV ECHO MEAS - ESV(MOD-SP2): 43 ML
BH CV ECHO MEAS - ESV(MOD-SP4): 43 ML
BH CV ECHO MEAS - IVSD: 1.1 CM
BH CV ECHO MEAS - LA DIMENSION(2D): 2.9 CM
BH CV ECHO MEAS - LAT PEAK E' VEL: 5.3 CM/SEC
BH CV ECHO MEAS - LVIDD: 4.8 CM
BH CV ECHO MEAS - LVIDS: 3.6 CM
BH CV ECHO MEAS - LVOT DIAM: 2 CM
BH CV ECHO MEAS - LVPWD: 1.1 CM
BH CV ECHO MEAS - MED PEAK E' VEL: 5.77 CM/SEC
BH CV ECHO MEAS - MV DEC TIME: 153 MSEC
BH CV ECHO MEAS - RVDD: 2.3 CM
BILIRUB UR QL STRIP: NEGATIVE
BUN SERPL-MCNC: 9 MG/DL (ref 8–23)
BUN/CREAT SERPL: 13.6 (ref 7–25)
CALCIUM SPEC-SCNC: 8.5 MG/DL (ref 8.6–10.5)
CHLORIDE SERPL-SCNC: 100 MMOL/L (ref 98–107)
CLARITY UR: CLEAR
CO2 SERPL-SCNC: 22.2 MMOL/L (ref 22–29)
COLOR UR: YELLOW
CREAT SERPL-MCNC: 0.66 MG/DL (ref 0.76–1.27)
DEPRECATED RDW RBC AUTO: 51.5 FL (ref 37–54)
EGFRCR SERPLBLD CKD-EPI 2021: 102.8 ML/MIN/1.73
EOSINOPHIL # BLD AUTO: 0 10*3/MM3 (ref 0–0.4)
EOSINOPHIL NFR BLD AUTO: 0 % (ref 0.3–6.2)
ERYTHROCYTE [DISTWIDTH] IN BLOOD BY AUTOMATED COUNT: 15.7 % (ref 12.3–15.4)
GLUCOSE SERPL-MCNC: 117 MG/DL (ref 65–99)
GLUCOSE UR STRIP-MCNC: NEGATIVE MG/DL
HCT VFR BLD AUTO: 27.9 % (ref 37.5–51)
HGB BLD-MCNC: 9.3 G/DL (ref 13–17.7)
HGB UR QL STRIP.AUTO: NEGATIVE
HYALINE CASTS UR QL AUTO: ABNORMAL /LPF
IMM GRANULOCYTES # BLD AUTO: 0.07 10*3/MM3 (ref 0–0.05)
IMM GRANULOCYTES NFR BLD AUTO: 0.6 % (ref 0–0.5)
IVRT: 74 MSEC
KETONES UR QL STRIP: ABNORMAL
L PNEUMO1 AG UR QL IA: NEGATIVE
LEFT ATRIUM VOLUME INDEX: 14.8 ML/M2
LEUKOCYTE ESTERASE UR QL STRIP.AUTO: ABNORMAL
LYMPHOCYTES # BLD AUTO: 1.01 10*3/MM3 (ref 0.7–3.1)
LYMPHOCYTES NFR BLD AUTO: 9.3 % (ref 19.6–45.3)
MAGNESIUM SERPL-MCNC: 1.9 MG/DL (ref 1.6–2.4)
MAXIMAL PREDICTED HEART RATE: 153 BPM
MCH RBC QN AUTO: 30.2 PG (ref 26.6–33)
MCHC RBC AUTO-ENTMCNC: 33.3 G/DL (ref 31.5–35.7)
MCV RBC AUTO: 90.6 FL (ref 79–97)
MONOCYTES # BLD AUTO: 0.11 10*3/MM3 (ref 0.1–0.9)
MONOCYTES NFR BLD AUTO: 1 % (ref 5–12)
MRSA DNA SPEC QL NAA+PROBE: NORMAL
NEUTROPHILS NFR BLD AUTO: 88.9 % (ref 42.7–76)
NEUTROPHILS NFR BLD AUTO: 9.67 10*3/MM3 (ref 1.7–7)
NITRITE UR QL STRIP: NEGATIVE
NRBC BLD AUTO-RTO: 0 /100 WBC (ref 0–0.2)
PH UR STRIP.AUTO: 5.5 [PH] (ref 5–8)
PLATELET # BLD AUTO: 406 10*3/MM3 (ref 140–450)
PMV BLD AUTO: 8.1 FL (ref 6–12)
POTASSIUM SERPL-SCNC: 4.2 MMOL/L (ref 3.5–5.2)
PROCALCITONIN SERPL-MCNC: 0.1 NG/ML (ref 0–0.25)
PROT UR QL STRIP: NEGATIVE
QT INTERVAL: 366 MS
RBC # BLD AUTO: 3.08 10*6/MM3 (ref 4.14–5.8)
RBC # UR STRIP: ABNORMAL /HPF
REF LAB TEST METHOD: ABNORMAL
S PNEUM AG SPEC QL LA: NEGATIVE
SODIUM SERPL-SCNC: 132 MMOL/L (ref 136–145)
SP GR UR STRIP: 1.01 (ref 1–1.03)
SQUAMOUS #/AREA URNS HPF: ABNORMAL /HPF
STRESS TARGET HR: 130 BPM
TROPONIN T SERPL-MCNC: 0.02 NG/ML (ref 0–0.03)
TSH SERPL DL<=0.05 MIU/L-ACNC: 0.4 UIU/ML (ref 0.27–4.2)
UROBILINOGEN UR QL STRIP: ABNORMAL
WBC # UR STRIP: ABNORMAL /HPF
WBC NRBC COR # BLD: 10.88 10*3/MM3 (ref 3.4–10.8)

## 2022-03-27 PROCEDURE — 87505 NFCT AGENT DETECTION GI: CPT | Performed by: INTERNAL MEDICINE

## 2022-03-27 PROCEDURE — 93306 TTE W/DOPPLER COMPLETE: CPT

## 2022-03-27 PROCEDURE — 84484 ASSAY OF TROPONIN QUANT: CPT | Performed by: SPECIALIST

## 2022-03-27 PROCEDURE — 80048 BASIC METABOLIC PNL TOTAL CA: CPT | Performed by: INTERNAL MEDICINE

## 2022-03-27 PROCEDURE — 87899 AGENT NOS ASSAY W/OPTIC: CPT | Performed by: INTERNAL MEDICINE

## 2022-03-27 PROCEDURE — 25010000002 FUROSEMIDE PER 20 MG: Performed by: INTERNAL MEDICINE

## 2022-03-27 PROCEDURE — 25010000002 ENOXAPARIN PER 10 MG: Performed by: SPECIALIST

## 2022-03-27 PROCEDURE — 84443 ASSAY THYROID STIM HORMONE: CPT | Performed by: INTERNAL MEDICINE

## 2022-03-27 PROCEDURE — 94799 UNLISTED PULMONARY SVC/PX: CPT

## 2022-03-27 PROCEDURE — 25010000002 CEFTRIAXONE PER 250 MG: Performed by: INTERNAL MEDICINE

## 2022-03-27 PROCEDURE — 99223 1ST HOSP IP/OBS HIGH 75: CPT | Performed by: INTERNAL MEDICINE

## 2022-03-27 PROCEDURE — 87641 MR-STAPH DNA AMP PROBE: CPT | Performed by: INTERNAL MEDICINE

## 2022-03-27 PROCEDURE — 83735 ASSAY OF MAGNESIUM: CPT | Performed by: INTERNAL MEDICINE

## 2022-03-27 PROCEDURE — 81001 URINALYSIS AUTO W/SCOPE: CPT | Performed by: INTERNAL MEDICINE

## 2022-03-27 PROCEDURE — 84145 PROCALCITONIN (PCT): CPT | Performed by: INTERNAL MEDICINE

## 2022-03-27 PROCEDURE — 85025 COMPLETE CBC W/AUTO DIFF WBC: CPT | Performed by: INTERNAL MEDICINE

## 2022-03-27 PROCEDURE — 63710000001 PREDNISONE PER 1 MG: Performed by: INTERNAL MEDICINE

## 2022-03-27 RX ORDER — FUROSEMIDE 10 MG/ML
20 INJECTION INTRAMUSCULAR; INTRAVENOUS 2 TIMES DAILY
Status: DISCONTINUED | OUTPATIENT
Start: 2022-03-27 | End: 2022-03-31 | Stop reason: HOSPADM

## 2022-03-27 RX ORDER — METOPROLOL SUCCINATE 25 MG/1
25 TABLET, EXTENDED RELEASE ORAL EVERY 12 HOURS SCHEDULED
Status: DISCONTINUED | OUTPATIENT
Start: 2022-03-27 | End: 2022-03-31 | Stop reason: HOSPADM

## 2022-03-27 RX ADMIN — IPRATROPIUM BROMIDE AND ALBUTEROL SULFATE 3 ML: 2.5; .5 SOLUTION RESPIRATORY (INHALATION) at 06:41

## 2022-03-27 RX ADMIN — METOPROLOL SUCCINATE 25 MG: 25 TABLET, EXTENDED RELEASE ORAL at 20:35

## 2022-03-27 RX ADMIN — IPRATROPIUM BROMIDE AND ALBUTEROL SULFATE 3 ML: 2.5; .5 SOLUTION RESPIRATORY (INHALATION) at 11:14

## 2022-03-27 RX ADMIN — ASPIRIN 81 MG: 81 TABLET, COATED ORAL at 10:07

## 2022-03-27 RX ADMIN — IPRATROPIUM BROMIDE AND ALBUTEROL SULFATE 3 ML: 2.5; .5 SOLUTION RESPIRATORY (INHALATION) at 03:39

## 2022-03-27 RX ADMIN — GUAIFENESIN 1200 MG: 600 TABLET ORAL at 10:15

## 2022-03-27 RX ADMIN — DOXYCYCLINE 100 MG: 100 CAPSULE ORAL at 01:02

## 2022-03-27 RX ADMIN — IPRATROPIUM BROMIDE AND ALBUTEROL SULFATE 3 ML: 2.5; .5 SOLUTION RESPIRATORY (INHALATION) at 20:17

## 2022-03-27 RX ADMIN — BUDESONIDE 0.5 MG: 0.5 SUSPENSION RESPIRATORY (INHALATION) at 06:41

## 2022-03-27 RX ADMIN — IPRATROPIUM BROMIDE AND ALBUTEROL SULFATE 3 ML: 2.5; .5 SOLUTION RESPIRATORY (INHALATION) at 15:19

## 2022-03-27 RX ADMIN — METOPROLOL SUCCINATE 25 MG: 25 TABLET, EXTENDED RELEASE ORAL at 10:07

## 2022-03-27 RX ADMIN — ARFORMOTEROL TARTRATE 15 MCG: 15 SOLUTION RESPIRATORY (INHALATION) at 20:17

## 2022-03-27 RX ADMIN — BUDESONIDE 0.5 MG: 0.5 SUSPENSION RESPIRATORY (INHALATION) at 20:17

## 2022-03-27 RX ADMIN — ENOXAPARIN SODIUM 60 MG: 60 INJECTION SUBCUTANEOUS at 10:15

## 2022-03-27 RX ADMIN — CEFTRIAXONE SODIUM 1 G: 1 INJECTION, SOLUTION INTRAVENOUS at 01:01

## 2022-03-27 RX ADMIN — FUROSEMIDE 20 MG: 10 INJECTION, SOLUTION INTRAMUSCULAR; INTRAVENOUS at 20:35

## 2022-03-27 RX ADMIN — FAMOTIDINE 20 MG: 20 TABLET ORAL at 10:07

## 2022-03-27 RX ADMIN — FUROSEMIDE 20 MG: 10 INJECTION, SOLUTION INTRAMUSCULAR; INTRAVENOUS at 10:08

## 2022-03-27 RX ADMIN — GUAIFENESIN 1200 MG: 600 TABLET ORAL at 20:35

## 2022-03-27 RX ADMIN — ARFORMOTEROL TARTRATE 15 MCG: 15 SOLUTION RESPIRATORY (INHALATION) at 06:41

## 2022-03-27 RX ADMIN — Medication 10 ML: at 10:08

## 2022-03-27 RX ADMIN — PREDNISONE 40 MG: 20 TABLET ORAL at 10:08

## 2022-03-27 RX ADMIN — FOLIC ACID 1 MG: 1 TABLET ORAL at 10:07

## 2022-03-27 RX ADMIN — ENOXAPARIN SODIUM 60 MG: 60 INJECTION SUBCUTANEOUS at 20:35

## 2022-03-28 ENCOUNTER — APPOINTMENT (OUTPATIENT)
Dept: GENERAL RADIOLOGY | Facility: HOSPITAL | Age: 67
End: 2022-03-28

## 2022-03-28 PROBLEM — R10.9 ABDOMINAL PAIN: Status: ACTIVE | Noted: 2022-03-28

## 2022-03-28 LAB
ANION GAP SERPL CALCULATED.3IONS-SCNC: 10.3 MMOL/L (ref 5–15)
BASOPHILS # BLD AUTO: 0.04 10*3/MM3 (ref 0–0.2)
BASOPHILS NFR BLD AUTO: 0.3 % (ref 0–1.5)
BUN SERPL-MCNC: 11 MG/DL (ref 8–23)
BUN/CREAT SERPL: 16.2 (ref 7–25)
C COLI+JEJ+UPSA DNA STL QL NAA+NON-PROBE: NOT DETECTED
CALCIUM SPEC-SCNC: 8.8 MG/DL (ref 8.6–10.5)
CHLORIDE SERPL-SCNC: 101 MMOL/L (ref 98–107)
CO2 SERPL-SCNC: 23.7 MMOL/L (ref 22–29)
CREAT SERPL-MCNC: 0.68 MG/DL (ref 0.76–1.27)
DEPRECATED RDW RBC AUTO: 51.3 FL (ref 37–54)
EC STX1+STX2 GENES STL QL NAA+NON-PROBE: NOT DETECTED
EGFRCR SERPLBLD CKD-EPI 2021: 101.9 ML/MIN/1.73
EOSINOPHIL # BLD AUTO: 0.02 10*3/MM3 (ref 0–0.4)
EOSINOPHIL NFR BLD AUTO: 0.2 % (ref 0.3–6.2)
ERYTHROCYTE [DISTWIDTH] IN BLOOD BY AUTOMATED COUNT: 15.5 % (ref 12.3–15.4)
GLUCOSE SERPL-MCNC: 92 MG/DL (ref 65–99)
HCT VFR BLD AUTO: 28.4 % (ref 37.5–51)
HGB BLD-MCNC: 9.7 G/DL (ref 13–17.7)
IMM GRANULOCYTES # BLD AUTO: 0.07 10*3/MM3 (ref 0–0.05)
IMM GRANULOCYTES NFR BLD AUTO: 0.6 % (ref 0–0.5)
LYMPHOCYTES # BLD AUTO: 2.42 10*3/MM3 (ref 0.7–3.1)
LYMPHOCYTES NFR BLD AUTO: 20.3 % (ref 19.6–45.3)
MCH RBC QN AUTO: 30.7 PG (ref 26.6–33)
MCHC RBC AUTO-ENTMCNC: 34.2 G/DL (ref 31.5–35.7)
MCV RBC AUTO: 89.9 FL (ref 79–97)
MONOCYTES # BLD AUTO: 0.79 10*3/MM3 (ref 0.1–0.9)
MONOCYTES NFR BLD AUTO: 6.6 % (ref 5–12)
NEUTROPHILS NFR BLD AUTO: 72 % (ref 42.7–76)
NEUTROPHILS NFR BLD AUTO: 8.61 10*3/MM3 (ref 1.7–7)
NRBC BLD AUTO-RTO: 0 /100 WBC (ref 0–0.2)
PLATELET # BLD AUTO: 482 10*3/MM3 (ref 140–450)
PMV BLD AUTO: 8.6 FL (ref 6–12)
POTASSIUM SERPL-SCNC: 3.9 MMOL/L (ref 3.5–5.2)
RBC # BLD AUTO: 3.16 10*6/MM3 (ref 4.14–5.8)
S ENT+BONG DNA STL QL NAA+NON-PROBE: NOT DETECTED
SHIGELLA SP+EIEC IPAH ST NAA+NON-PROBE: NOT DETECTED
SODIUM SERPL-SCNC: 135 MMOL/L (ref 136–145)
WBC NRBC COR # BLD: 11.95 10*3/MM3 (ref 3.4–10.8)

## 2022-03-28 PROCEDURE — 94799 UNLISTED PULMONARY SVC/PX: CPT

## 2022-03-28 PROCEDURE — 63710000001 PREDNISONE PER 1 MG: Performed by: INTERNAL MEDICINE

## 2022-03-28 PROCEDURE — 74221 X-RAY XM ESOPHAGUS 2CNTRST: CPT

## 2022-03-28 PROCEDURE — 92610 EVALUATE SWALLOWING FUNCTION: CPT

## 2022-03-28 PROCEDURE — 25010000002 FUROSEMIDE PER 20 MG: Performed by: INTERNAL MEDICINE

## 2022-03-28 PROCEDURE — 25010000002 CEFTRIAXONE PER 250 MG: Performed by: INTERNAL MEDICINE

## 2022-03-28 PROCEDURE — 99222 1ST HOSP IP/OBS MODERATE 55: CPT | Performed by: INTERNAL MEDICINE

## 2022-03-28 PROCEDURE — 80048 BASIC METABOLIC PNL TOTAL CA: CPT | Performed by: INTERNAL MEDICINE

## 2022-03-28 PROCEDURE — 25010000002 ENOXAPARIN PER 10 MG: Performed by: SPECIALIST

## 2022-03-28 PROCEDURE — 93005 ELECTROCARDIOGRAM TRACING: CPT | Performed by: INTERNAL MEDICINE

## 2022-03-28 PROCEDURE — 85025 COMPLETE CBC W/AUTO DIFF WBC: CPT | Performed by: INTERNAL MEDICINE

## 2022-03-28 PROCEDURE — 93010 ELECTROCARDIOGRAM REPORT: CPT | Performed by: INTERNAL MEDICINE

## 2022-03-28 PROCEDURE — 97161 PT EVAL LOW COMPLEX 20 MIN: CPT

## 2022-03-28 RX ADMIN — METOPROLOL SUCCINATE 25 MG: 25 TABLET, EXTENDED RELEASE ORAL at 21:37

## 2022-03-28 RX ADMIN — IPRATROPIUM BROMIDE AND ALBUTEROL SULFATE 3 ML: 2.5; .5 SOLUTION RESPIRATORY (INHALATION) at 19:15

## 2022-03-28 RX ADMIN — METOPROLOL SUCCINATE 25 MG: 25 TABLET, EXTENDED RELEASE ORAL at 09:45

## 2022-03-28 RX ADMIN — IPRATROPIUM BROMIDE AND ALBUTEROL SULFATE 3 ML: 2.5; .5 SOLUTION RESPIRATORY (INHALATION) at 23:24

## 2022-03-28 RX ADMIN — CEFTRIAXONE SODIUM 1 G: 1 INJECTION, SOLUTION INTRAVENOUS at 00:14

## 2022-03-28 RX ADMIN — TRAZODONE HYDROCHLORIDE 50 MG: 50 TABLET ORAL at 00:14

## 2022-03-28 RX ADMIN — ARFORMOTEROL TARTRATE 15 MCG: 15 SOLUTION RESPIRATORY (INHALATION) at 07:52

## 2022-03-28 RX ADMIN — FUROSEMIDE 20 MG: 10 INJECTION, SOLUTION INTRAMUSCULAR; INTRAVENOUS at 21:37

## 2022-03-28 RX ADMIN — ANTACID/ANTIFLATULENT 1 PACKET: 380; 550; 10; 10 GRANULE, EFFERVESCENT ORAL at 12:30

## 2022-03-28 RX ADMIN — GUAIFENESIN 1200 MG: 600 TABLET ORAL at 09:45

## 2022-03-28 RX ADMIN — PREDNISONE 40 MG: 20 TABLET ORAL at 09:45

## 2022-03-28 RX ADMIN — FUROSEMIDE 20 MG: 10 INJECTION, SOLUTION INTRAMUSCULAR; INTRAVENOUS at 09:46

## 2022-03-28 RX ADMIN — ENOXAPARIN SODIUM 60 MG: 60 INJECTION SUBCUTANEOUS at 21:37

## 2022-03-28 RX ADMIN — FOLIC ACID 1 MG: 1 TABLET ORAL at 09:44

## 2022-03-28 RX ADMIN — BUDESONIDE 0.5 MG: 0.5 SUSPENSION RESPIRATORY (INHALATION) at 19:15

## 2022-03-28 RX ADMIN — GUAIFENESIN 1200 MG: 600 TABLET ORAL at 21:37

## 2022-03-28 RX ADMIN — ASPIRIN 81 MG: 81 TABLET, COATED ORAL at 09:45

## 2022-03-28 RX ADMIN — ENOXAPARIN SODIUM 60 MG: 60 INJECTION SUBCUTANEOUS at 06:19

## 2022-03-28 RX ADMIN — IPRATROPIUM BROMIDE AND ALBUTEROL SULFATE 3 ML: 2.5; .5 SOLUTION RESPIRATORY (INHALATION) at 15:21

## 2022-03-28 RX ADMIN — BARIUM SULFATE 135 ML: 980 POWDER, FOR SUSPENSION ORAL at 12:30

## 2022-03-28 RX ADMIN — BUDESONIDE 0.5 MG: 0.5 SUSPENSION RESPIRATORY (INHALATION) at 07:52

## 2022-03-28 RX ADMIN — IPRATROPIUM BROMIDE AND ALBUTEROL SULFATE 3 ML: 2.5; .5 SOLUTION RESPIRATORY (INHALATION) at 00:11

## 2022-03-28 RX ADMIN — BARIUM SULFATE 355 ML: 0.6 SUSPENSION ORAL at 12:30

## 2022-03-28 RX ADMIN — FAMOTIDINE 20 MG: 20 TABLET ORAL at 09:45

## 2022-03-28 RX ADMIN — ARFORMOTEROL TARTRATE 15 MCG: 15 SOLUTION RESPIRATORY (INHALATION) at 19:15

## 2022-03-28 RX ADMIN — IPRATROPIUM BROMIDE AND ALBUTEROL SULFATE 3 ML: 2.5; .5 SOLUTION RESPIRATORY (INHALATION) at 11:45

## 2022-03-28 RX ADMIN — IPRATROPIUM BROMIDE AND ALBUTEROL SULFATE 3 ML: 2.5; .5 SOLUTION RESPIRATORY (INHALATION) at 07:52

## 2022-03-29 ENCOUNTER — ANESTHESIA (OUTPATIENT)
Dept: GASTROENTEROLOGY | Facility: HOSPITAL | Age: 67
End: 2022-03-29

## 2022-03-29 ENCOUNTER — ANESTHESIA EVENT (OUTPATIENT)
Dept: GASTROENTEROLOGY | Facility: HOSPITAL | Age: 67
End: 2022-03-29

## 2022-03-29 LAB
ANION GAP SERPL CALCULATED.3IONS-SCNC: 9.7 MMOL/L (ref 5–15)
BACTERIA SPEC RESP CULT: NORMAL
BUN SERPL-MCNC: 18 MG/DL (ref 8–23)
BUN/CREAT SERPL: 22 (ref 7–25)
CALCIUM SPEC-SCNC: 9.4 MG/DL (ref 8.6–10.5)
CHLORIDE SERPL-SCNC: 99 MMOL/L (ref 98–107)
CO2 SERPL-SCNC: 26.3 MMOL/L (ref 22–29)
CREAT SERPL-MCNC: 0.82 MG/DL (ref 0.76–1.27)
EGFRCR SERPLBLD CKD-EPI 2021: 96.3 ML/MIN/1.73
GLUCOSE SERPL-MCNC: 83 MG/DL (ref 65–99)
GRAM STN SPEC: NORMAL
GRAM STN SPEC: NORMAL
POTASSIUM SERPL-SCNC: 3.9 MMOL/L (ref 3.5–5.2)
QT INTERVAL: 438 MS
SODIUM SERPL-SCNC: 135 MMOL/L (ref 136–145)

## 2022-03-29 PROCEDURE — 63710000001 PREDNISONE PER 1 MG: Performed by: INTERNAL MEDICINE

## 2022-03-29 PROCEDURE — 94799 UNLISTED PULMONARY SVC/PX: CPT

## 2022-03-29 PROCEDURE — 80048 BASIC METABOLIC PNL TOTAL CA: CPT | Performed by: INTERNAL MEDICINE

## 2022-03-29 PROCEDURE — 25010000002 FUROSEMIDE PER 20 MG: Performed by: INTERNAL MEDICINE

## 2022-03-29 PROCEDURE — 88305 TISSUE EXAM BY PATHOLOGIST: CPT | Performed by: INTERNAL MEDICINE

## 2022-03-29 PROCEDURE — 43239 EGD BIOPSY SINGLE/MULTIPLE: CPT | Performed by: INTERNAL MEDICINE

## 2022-03-29 PROCEDURE — 25010000002 ENOXAPARIN PER 10 MG: Performed by: SPECIALIST

## 2022-03-29 PROCEDURE — 25010000002 CEFTRIAXONE PER 250 MG: Performed by: INTERNAL MEDICINE

## 2022-03-29 PROCEDURE — 25010000002 KETOROLAC TROMETHAMINE PER 15 MG: Performed by: SPECIALIST

## 2022-03-29 PROCEDURE — 0DB38ZX EXCISION OF LOWER ESOPHAGUS, VIA NATURAL OR ARTIFICIAL OPENING ENDOSCOPIC, DIAGNOSTIC: ICD-10-PCS | Performed by: INTERNAL MEDICINE

## 2022-03-29 PROCEDURE — 25010000002 PROPOFOL 10 MG/ML EMULSION: Performed by: NURSE ANESTHETIST, CERTIFIED REGISTERED

## 2022-03-29 PROCEDURE — 97530 THERAPEUTIC ACTIVITIES: CPT

## 2022-03-29 PROCEDURE — 97165 OT EVAL LOW COMPLEX 30 MIN: CPT

## 2022-03-29 RX ORDER — PROPOFOL 10 MG/ML
VIAL (ML) INTRAVENOUS AS NEEDED
Status: DISCONTINUED | OUTPATIENT
Start: 2022-03-29 | End: 2022-03-29 | Stop reason: SURG

## 2022-03-29 RX ORDER — LIDOCAINE HYDROCHLORIDE 20 MG/ML
INJECTION, SOLUTION INFILTRATION; PERINEURAL AS NEEDED
Status: DISCONTINUED | OUTPATIENT
Start: 2022-03-29 | End: 2022-03-29 | Stop reason: SURG

## 2022-03-29 RX ORDER — KETOROLAC TROMETHAMINE 30 MG/ML
30 INJECTION, SOLUTION INTRAMUSCULAR; INTRAVENOUS ONCE
Status: COMPLETED | OUTPATIENT
Start: 2022-03-29 | End: 2022-03-29

## 2022-03-29 RX ORDER — SODIUM CHLORIDE, SODIUM LACTATE, POTASSIUM CHLORIDE, CALCIUM CHLORIDE 600; 310; 30; 20 MG/100ML; MG/100ML; MG/100ML; MG/100ML
30 INJECTION, SOLUTION INTRAVENOUS CONTINUOUS
Status: DISCONTINUED | OUTPATIENT
Start: 2022-03-29 | End: 2022-03-31 | Stop reason: HOSPADM

## 2022-03-29 RX ADMIN — SODIUM CHLORIDE, POTASSIUM CHLORIDE, SODIUM LACTATE AND CALCIUM CHLORIDE 30 ML/HR: 600; 310; 30; 20 INJECTION, SOLUTION INTRAVENOUS at 17:11

## 2022-03-29 RX ADMIN — PREDNISONE 40 MG: 20 TABLET ORAL at 10:23

## 2022-03-29 RX ADMIN — TRAZODONE HYDROCHLORIDE 50 MG: 50 TABLET ORAL at 01:07

## 2022-03-29 RX ADMIN — ARFORMOTEROL TARTRATE 15 MCG: 15 SOLUTION RESPIRATORY (INHALATION) at 18:30

## 2022-03-29 RX ADMIN — ENOXAPARIN SODIUM 60 MG: 60 INJECTION SUBCUTANEOUS at 20:53

## 2022-03-29 RX ADMIN — SODIUM CHLORIDE, POTASSIUM CHLORIDE, SODIUM LACTATE AND CALCIUM CHLORIDE: 600; 310; 30; 20 INJECTION, SOLUTION INTRAVENOUS at 15:40

## 2022-03-29 RX ADMIN — CEFTRIAXONE SODIUM 1 G: 1 INJECTION, SOLUTION INTRAVENOUS at 01:07

## 2022-03-29 RX ADMIN — FUROSEMIDE 20 MG: 10 INJECTION, SOLUTION INTRAMUSCULAR; INTRAVENOUS at 20:53

## 2022-03-29 RX ADMIN — BUDESONIDE 0.5 MG: 0.5 SUSPENSION RESPIRATORY (INHALATION) at 18:30

## 2022-03-29 RX ADMIN — PROPOFOL 20 MG: 10 INJECTION, EMULSION INTRAVENOUS at 15:56

## 2022-03-29 RX ADMIN — PROPOFOL 40 MG: 10 INJECTION, EMULSION INTRAVENOUS at 15:59

## 2022-03-29 RX ADMIN — GUAIFENESIN 1200 MG: 600 TABLET ORAL at 20:53

## 2022-03-29 RX ADMIN — FUROSEMIDE 20 MG: 10 INJECTION, SOLUTION INTRAMUSCULAR; INTRAVENOUS at 10:23

## 2022-03-29 RX ADMIN — PROPOFOL 10 MG: 10 INJECTION, EMULSION INTRAVENOUS at 16:02

## 2022-03-29 RX ADMIN — ARFORMOTEROL TARTRATE 15 MCG: 15 SOLUTION RESPIRATORY (INHALATION) at 08:13

## 2022-03-29 RX ADMIN — IPRATROPIUM BROMIDE AND ALBUTEROL SULFATE 3 ML: 2.5; .5 SOLUTION RESPIRATORY (INHALATION) at 08:13

## 2022-03-29 RX ADMIN — LIDOCAINE HYDROCHLORIDE 80 MG: 20 INJECTION, SOLUTION INFILTRATION; PERINEURAL at 15:58

## 2022-03-29 RX ADMIN — METOPROLOL SUCCINATE 25 MG: 25 TABLET, EXTENDED RELEASE ORAL at 10:23

## 2022-03-29 RX ADMIN — KETOROLAC TROMETHAMINE 30 MG: 30 INJECTION, SOLUTION INTRAMUSCULAR; INTRAVENOUS at 20:52

## 2022-03-29 RX ADMIN — IPRATROPIUM BROMIDE AND ALBUTEROL SULFATE 3 ML: 2.5; .5 SOLUTION RESPIRATORY (INHALATION) at 02:45

## 2022-03-29 RX ADMIN — PROPOFOL 50 MG: 10 INJECTION, EMULSION INTRAVENOUS at 15:50

## 2022-03-29 RX ADMIN — IPRATROPIUM BROMIDE AND ALBUTEROL SULFATE 3 ML: 2.5; .5 SOLUTION RESPIRATORY (INHALATION) at 12:13

## 2022-03-29 RX ADMIN — FOLIC ACID 1 MG: 1 TABLET ORAL at 10:23

## 2022-03-29 RX ADMIN — PROPOFOL 20 MG: 10 INJECTION, EMULSION INTRAVENOUS at 15:53

## 2022-03-29 RX ADMIN — FAMOTIDINE 20 MG: 20 TABLET ORAL at 10:23

## 2022-03-29 RX ADMIN — METOPROLOL SUCCINATE 25 MG: 25 TABLET, EXTENDED RELEASE ORAL at 20:53

## 2022-03-29 RX ADMIN — BUDESONIDE 0.5 MG: 0.5 SUSPENSION RESPIRATORY (INHALATION) at 08:13

## 2022-03-29 RX ADMIN — IPRATROPIUM BROMIDE AND ALBUTEROL SULFATE 3 ML: 2.5; .5 SOLUTION RESPIRATORY (INHALATION) at 18:30

## 2022-03-29 RX ADMIN — PROPOFOL 10 MG: 10 INJECTION, EMULSION INTRAVENOUS at 16:03

## 2022-03-29 RX ADMIN — IPRATROPIUM BROMIDE AND ALBUTEROL SULFATE 3 ML: 2.5; .5 SOLUTION RESPIRATORY (INHALATION) at 22:52

## 2022-03-29 RX ADMIN — GUAIFENESIN 1200 MG: 600 TABLET ORAL at 10:23

## 2022-03-29 RX ADMIN — TRAZODONE HYDROCHLORIDE 50 MG: 50 TABLET ORAL at 22:47

## 2022-03-29 NOTE — ANESTHESIA POSTPROCEDURE EVALUATION
Patient: Hai Gold    Procedure Summary     Date: 03/29/22 Room / Location: [facility] ENDOSCOPY 4 / [facility] ENDOSCOPY    Anesthesia Start: 1540 Anesthesia Stop: 1610    Procedure: ESOPHAGOGASTRODUODENOSCOPY WITH BIOPSIES (N/A ) Diagnosis:       Epigastric pain      (Epigastric pain [R10.13])    Surgeons: Min Campbell MD Provider: Asad Chand MD    Anesthesia Type: general, MAC ASA Status: 3          Anesthesia Type: general, MAC    Vitals  Vitals Value Taken Time   BP 98/70 03/29/22 1632   Temp 36.6 °C (97.8 °F) 03/29/22 1630   Pulse 81 03/29/22 1634   Resp 23 03/29/22 1630   SpO2 100 % 03/29/22 1634   Vitals shown include unvalidated device data.        Post Anesthesia Care and Evaluation    Patient location during evaluation: bedside  Patient participation: complete - patient participated  Level of consciousness: awake  Pain management: adequate  Airway patency: patent  Anesthetic complications: No anesthetic complications  PONV Status: none  Cardiovascular status: acceptable and stable  Respiratory status: acceptable  Hydration status: acceptable    Comments: An Anesthesiologist personally participated in the most demanding procedures (including induction and emergence if applicable) in the anesthesia plan, monitored the course of anesthesia administration at frequent intervals and remained physically present and available for immediate diagnosis and treatment of emergencies.

## 2022-03-29 NOTE — ANESTHESIA PREPROCEDURE EVALUATION
Anesthesia Evaluation     Patient summary reviewed and Nursing notes reviewed   NPO Solid Status: > 6 hours  NPO Liquid Status: > 6 hours           Airway   Mallampati: I  TM distance: >3 FB  Dental      Pulmonary - normal exam   (+) pneumonia , COPD,   Cardiovascular - normal exam  Exercise tolerance: poor (<4 METS)        Neuro/Psych  GI/Hepatic/Renal/Endo - negative ROS     Musculoskeletal     Abdominal    Substance History      OB/GYN          Other                        Anesthesia Plan    ASA 3     general and MAC     intravenous induction     Anesthetic plan, all risks, benefits, and alternatives have been provided, discussed and informed consent has been obtained with: patient.        CODE STATUS:    Code Status (Patient has no pulse and is not breathing): CPR (Attempt to Resuscitate)  Medical Interventions (Patient has pulse or is breathing): Full Support

## 2022-03-30 ENCOUNTER — APPOINTMENT (OUTPATIENT)
Dept: GENERAL RADIOLOGY | Facility: HOSPITAL | Age: 67
End: 2022-03-30

## 2022-03-30 LAB
ANION GAP SERPL CALCULATED.3IONS-SCNC: 8.8 MMOL/L (ref 5–15)
BUN SERPL-MCNC: 17 MG/DL (ref 8–23)
BUN/CREAT SERPL: 22.4 (ref 7–25)
CALCIUM SPEC-SCNC: 8.7 MG/DL (ref 8.6–10.5)
CHLORIDE SERPL-SCNC: 100 MMOL/L (ref 98–107)
CO2 SERPL-SCNC: 27.2 MMOL/L (ref 22–29)
CREAT SERPL-MCNC: 0.76 MG/DL (ref 0.76–1.27)
EGFRCR SERPLBLD CKD-EPI 2021: 98.5 ML/MIN/1.73
GLUCOSE SERPL-MCNC: 80 MG/DL (ref 65–99)
POTASSIUM SERPL-SCNC: 3.6 MMOL/L (ref 3.5–5.2)
SODIUM SERPL-SCNC: 136 MMOL/L (ref 136–145)

## 2022-03-30 PROCEDURE — 97110 THERAPEUTIC EXERCISES: CPT

## 2022-03-30 PROCEDURE — 25010000002 FUROSEMIDE PER 20 MG: Performed by: INTERNAL MEDICINE

## 2022-03-30 PROCEDURE — 63710000001 PREDNISONE PER 1 MG: Performed by: INTERNAL MEDICINE

## 2022-03-30 PROCEDURE — 94799 UNLISTED PULMONARY SVC/PX: CPT

## 2022-03-30 PROCEDURE — 92526 ORAL FUNCTION THERAPY: CPT

## 2022-03-30 PROCEDURE — 25010000002 ENOXAPARIN PER 10 MG: Performed by: SPECIALIST

## 2022-03-30 PROCEDURE — 94760 N-INVAS EAR/PLS OXIMETRY 1: CPT

## 2022-03-30 PROCEDURE — 74230 X-RAY XM SWLNG FUNCJ C+: CPT

## 2022-03-30 PROCEDURE — 25010000002 CEFTRIAXONE PER 250 MG: Performed by: INTERNAL MEDICINE

## 2022-03-30 PROCEDURE — 97530 THERAPEUTIC ACTIVITIES: CPT

## 2022-03-30 PROCEDURE — 92611 MOTION FLUOROSCOPY/SWALLOW: CPT

## 2022-03-30 PROCEDURE — 80048 BASIC METABOLIC PNL TOTAL CA: CPT | Performed by: INTERNAL MEDICINE

## 2022-03-30 RX ORDER — ZOLPIDEM TARTRATE 5 MG/1
5 TABLET ORAL ONCE
Status: COMPLETED | OUTPATIENT
Start: 2022-03-30 | End: 2022-03-30

## 2022-03-30 RX ADMIN — IPRATROPIUM BROMIDE AND ALBUTEROL SULFATE 3 ML: 2.5; .5 SOLUTION RESPIRATORY (INHALATION) at 10:50

## 2022-03-30 RX ADMIN — ENOXAPARIN SODIUM 60 MG: 60 INJECTION SUBCUTANEOUS at 06:56

## 2022-03-30 RX ADMIN — ENOXAPARIN SODIUM 60 MG: 60 INJECTION SUBCUTANEOUS at 20:20

## 2022-03-30 RX ADMIN — ZOLPIDEM TARTRATE 5 MG: 5 TABLET ORAL at 20:54

## 2022-03-30 RX ADMIN — ARFORMOTEROL TARTRATE 15 MCG: 15 SOLUTION RESPIRATORY (INHALATION) at 06:38

## 2022-03-30 RX ADMIN — IPRATROPIUM BROMIDE AND ALBUTEROL SULFATE 3 ML: 2.5; .5 SOLUTION RESPIRATORY (INHALATION) at 15:04

## 2022-03-30 RX ADMIN — BARIUM SULFATE 50 ML: 400 SUSPENSION ORAL at 13:54

## 2022-03-30 RX ADMIN — BARIUM SULFATE 1 TEASPOON(S): 0.6 CREAM ORAL at 13:53

## 2022-03-30 RX ADMIN — BUDESONIDE 0.5 MG: 0.5 SUSPENSION RESPIRATORY (INHALATION) at 06:38

## 2022-03-30 RX ADMIN — GUAIFENESIN 1200 MG: 600 TABLET ORAL at 20:21

## 2022-03-30 RX ADMIN — METOPROLOL SUCCINATE 25 MG: 25 TABLET, EXTENDED RELEASE ORAL at 08:06

## 2022-03-30 RX ADMIN — IPRATROPIUM BROMIDE AND ALBUTEROL SULFATE 3 ML: 2.5; .5 SOLUTION RESPIRATORY (INHALATION) at 06:38

## 2022-03-30 RX ADMIN — IPRATROPIUM BROMIDE AND ALBUTEROL SULFATE 3 ML: 2.5; .5 SOLUTION RESPIRATORY (INHALATION) at 03:02

## 2022-03-30 RX ADMIN — BUDESONIDE 0.5 MG: 0.5 SUSPENSION RESPIRATORY (INHALATION) at 18:54

## 2022-03-30 RX ADMIN — FOLIC ACID 1 MG: 1 TABLET ORAL at 08:06

## 2022-03-30 RX ADMIN — FUROSEMIDE 20 MG: 10 INJECTION, SOLUTION INTRAMUSCULAR; INTRAVENOUS at 08:06

## 2022-03-30 RX ADMIN — GUAIFENESIN 1200 MG: 600 TABLET ORAL at 08:06

## 2022-03-30 RX ADMIN — FUROSEMIDE 20 MG: 10 INJECTION, SOLUTION INTRAMUSCULAR; INTRAVENOUS at 20:21

## 2022-03-30 RX ADMIN — FAMOTIDINE 20 MG: 20 TABLET ORAL at 08:06

## 2022-03-30 RX ADMIN — PREDNISONE 40 MG: 20 TABLET ORAL at 08:06

## 2022-03-30 RX ADMIN — ASPIRIN 81 MG: 81 TABLET, COATED ORAL at 08:06

## 2022-03-30 RX ADMIN — IPRATROPIUM BROMIDE AND ALBUTEROL SULFATE 3 ML: 2.5; .5 SOLUTION RESPIRATORY (INHALATION) at 18:54

## 2022-03-30 RX ADMIN — CEFTRIAXONE SODIUM 1 G: 1 INJECTION, SOLUTION INTRAVENOUS at 01:02

## 2022-03-30 RX ADMIN — CEFTRIAXONE SODIUM 1 G: 1 INJECTION, SOLUTION INTRAVENOUS at 23:28

## 2022-03-30 RX ADMIN — METOPROLOL SUCCINATE 25 MG: 25 TABLET, EXTENDED RELEASE ORAL at 20:21

## 2022-03-30 RX ADMIN — ARFORMOTEROL TARTRATE 15 MCG: 15 SOLUTION RESPIRATORY (INHALATION) at 18:54

## 2022-03-30 RX ADMIN — BARIUM SULFATE 55 ML: 0.81 POWDER, FOR SUSPENSION ORAL at 13:53

## 2022-03-31 ENCOUNTER — READMISSION MANAGEMENT (OUTPATIENT)
Dept: CALL CENTER | Facility: HOSPITAL | Age: 67
End: 2022-03-31

## 2022-03-31 ENCOUNTER — APPOINTMENT (OUTPATIENT)
Dept: NUCLEAR MEDICINE | Facility: HOSPITAL | Age: 67
End: 2022-03-31

## 2022-03-31 VITALS
DIASTOLIC BLOOD PRESSURE: 62 MMHG | WEIGHT: 125.88 LBS | BODY MASS INDEX: 19.08 KG/M2 | SYSTOLIC BLOOD PRESSURE: 98 MMHG | TEMPERATURE: 97.2 F | HEART RATE: 79 BPM | RESPIRATION RATE: 18 BRPM | OXYGEN SATURATION: 100 % | HEIGHT: 68 IN

## 2022-03-31 PROBLEM — R07.9 CHEST PAIN, UNSPECIFIED TYPE: Status: RESOLVED | Noted: 2022-03-26 | Resolved: 2022-03-31

## 2022-03-31 PROBLEM — J44.1 COPD EXACERBATION: Status: RESOLVED | Noted: 2022-03-26 | Resolved: 2022-03-31

## 2022-03-31 PROBLEM — R10.9 ABDOMINAL PAIN: Status: RESOLVED | Noted: 2022-03-28 | Resolved: 2022-03-31

## 2022-03-31 LAB
ALBUMIN SERPL-MCNC: 2.9 G/DL (ref 3.5–5.2)
ALBUMIN/GLOB SERPL: 0.7 G/DL
ALP SERPL-CCNC: 61 U/L (ref 39–117)
ALT SERPL W P-5'-P-CCNC: 28 U/L (ref 1–41)
ANION GAP SERPL CALCULATED.3IONS-SCNC: 8.3 MMOL/L (ref 5–15)
AST SERPL-CCNC: 23 U/L (ref 1–40)
BACTERIA SPEC AEROBE CULT: NORMAL
BACTERIA SPEC AEROBE CULT: NORMAL
BASOPHILS # BLD AUTO: 0.03 10*3/MM3 (ref 0–0.2)
BASOPHILS NFR BLD AUTO: 0.3 % (ref 0–1.5)
BILIRUB SERPL-MCNC: <0.2 MG/DL (ref 0–1.2)
BUN SERPL-MCNC: 17 MG/DL (ref 8–23)
BUN/CREAT SERPL: 22.7 (ref 7–25)
CALCIUM SPEC-SCNC: 8.8 MG/DL (ref 8.6–10.5)
CHLORIDE SERPL-SCNC: 99 MMOL/L (ref 98–107)
CO2 SERPL-SCNC: 28.7 MMOL/L (ref 22–29)
CREAT SERPL-MCNC: 0.75 MG/DL (ref 0.76–1.27)
CYTO UR: NORMAL
DEPRECATED RDW RBC AUTO: 52.1 FL (ref 37–54)
EGFRCR SERPLBLD CKD-EPI 2021: 98.9 ML/MIN/1.73
EOSINOPHIL # BLD AUTO: 0.02 10*3/MM3 (ref 0–0.4)
EOSINOPHIL NFR BLD AUTO: 0.2 % (ref 0.3–6.2)
ERYTHROCYTE [DISTWIDTH] IN BLOOD BY AUTOMATED COUNT: 15.8 % (ref 12.3–15.4)
GLOBULIN UR ELPH-MCNC: 4.4 GM/DL
GLUCOSE SERPL-MCNC: 75 MG/DL (ref 65–99)
HCT VFR BLD AUTO: 29.7 % (ref 37.5–51)
HGB BLD-MCNC: 10.1 G/DL (ref 13–17.7)
IMM GRANULOCYTES # BLD AUTO: 0.1 10*3/MM3 (ref 0–0.05)
IMM GRANULOCYTES NFR BLD AUTO: 0.9 % (ref 0–0.5)
LAB AP CASE REPORT: NORMAL
LAB AP CLINICAL INFORMATION: NORMAL
LYMPHOCYTES # BLD AUTO: 2.52 10*3/MM3 (ref 0.7–3.1)
LYMPHOCYTES NFR BLD AUTO: 23.4 % (ref 19.6–45.3)
MCH RBC QN AUTO: 30.9 PG (ref 26.6–33)
MCHC RBC AUTO-ENTMCNC: 34 G/DL (ref 31.5–35.7)
MCV RBC AUTO: 90.8 FL (ref 79–97)
MONOCYTES # BLD AUTO: 0.93 10*3/MM3 (ref 0.1–0.9)
MONOCYTES NFR BLD AUTO: 8.6 % (ref 5–12)
NEUTROPHILS NFR BLD AUTO: 66.6 % (ref 42.7–76)
NEUTROPHILS NFR BLD AUTO: 7.17 10*3/MM3 (ref 1.7–7)
NRBC BLD AUTO-RTO: 0 /100 WBC (ref 0–0.2)
PATH REPORT.FINAL DX SPEC: NORMAL
PATH REPORT.GROSS SPEC: NORMAL
PLATELET # BLD AUTO: 449 10*3/MM3 (ref 140–450)
PMV BLD AUTO: 8.9 FL (ref 6–12)
POTASSIUM SERPL-SCNC: 3.7 MMOL/L (ref 3.5–5.2)
PROT SERPL-MCNC: 7.3 G/DL (ref 6–8.5)
RBC # BLD AUTO: 3.27 10*6/MM3 (ref 4.14–5.8)
SODIUM SERPL-SCNC: 136 MMOL/L (ref 136–145)
WBC NRBC COR # BLD: 10.77 10*3/MM3 (ref 3.4–10.8)

## 2022-03-31 PROCEDURE — 94664 DEMO&/EVAL PT USE INHALER: CPT

## 2022-03-31 PROCEDURE — 80053 COMPREHEN METABOLIC PANEL: CPT | Performed by: INTERNAL MEDICINE

## 2022-03-31 PROCEDURE — 94799 UNLISTED PULMONARY SVC/PX: CPT

## 2022-03-31 PROCEDURE — 78452 HT MUSCLE IMAGE SPECT MULT: CPT

## 2022-03-31 PROCEDURE — A9502 TC99M TETROFOSMIN: HCPCS | Performed by: INTERNAL MEDICINE

## 2022-03-31 PROCEDURE — 93017 CV STRESS TEST TRACING ONLY: CPT

## 2022-03-31 PROCEDURE — 25010000002 REGADENOSON 0.4 MG/5ML SOLUTION: Performed by: INTERNAL MEDICINE

## 2022-03-31 PROCEDURE — 0 TECHNETIUM TETROFOSMIN KIT: Performed by: INTERNAL MEDICINE

## 2022-03-31 PROCEDURE — 85025 COMPLETE CBC W/AUTO DIFF WBC: CPT | Performed by: INTERNAL MEDICINE

## 2022-03-31 PROCEDURE — 25010000002 FUROSEMIDE PER 20 MG: Performed by: INTERNAL MEDICINE

## 2022-03-31 RX ORDER — IPRATROPIUM BROMIDE AND ALBUTEROL SULFATE 2.5; .5 MG/3ML; MG/3ML
3 SOLUTION RESPIRATORY (INHALATION) EVERY 4 HOURS PRN
Status: DISCONTINUED | OUTPATIENT
Start: 2022-03-31 | End: 2022-03-31 | Stop reason: HOSPADM

## 2022-03-31 RX ORDER — GUAIFENESIN 600 MG/1
1200 TABLET, EXTENDED RELEASE ORAL 2 TIMES DAILY PRN
Qty: 20 TABLET | Refills: 0 | Status: SHIPPED | OUTPATIENT
Start: 2022-03-31 | End: 2022-04-10

## 2022-03-31 RX ORDER — ALBUTEROL SULFATE 90 UG/1
2 AEROSOL, METERED RESPIRATORY (INHALATION) EVERY 4 HOURS PRN
Qty: 18 G | Refills: 0 | Status: SHIPPED | OUTPATIENT
Start: 2022-03-31 | End: 2022-04-30

## 2022-03-31 RX ADMIN — METOPROLOL SUCCINATE 25 MG: 25 TABLET, EXTENDED RELEASE ORAL at 11:13

## 2022-03-31 RX ADMIN — FUROSEMIDE 20 MG: 10 INJECTION, SOLUTION INTRAMUSCULAR; INTRAVENOUS at 11:12

## 2022-03-31 RX ADMIN — IPRATROPIUM BROMIDE AND ALBUTEROL SULFATE 3 ML: 2.5; .5 SOLUTION RESPIRATORY (INHALATION) at 00:03

## 2022-03-31 RX ADMIN — IPRATROPIUM BROMIDE AND ALBUTEROL SULFATE 3 ML: 2.5; .5 SOLUTION RESPIRATORY (INHALATION) at 03:27

## 2022-03-31 RX ADMIN — FAMOTIDINE 20 MG: 20 TABLET ORAL at 11:13

## 2022-03-31 RX ADMIN — ASPIRIN 81 MG: 81 TABLET, COATED ORAL at 11:13

## 2022-03-31 RX ADMIN — BUDESONIDE 0.5 MG: 0.5 SUSPENSION RESPIRATORY (INHALATION) at 06:30

## 2022-03-31 RX ADMIN — ARFORMOTEROL TARTRATE 15 MCG: 15 SOLUTION RESPIRATORY (INHALATION) at 06:30

## 2022-03-31 RX ADMIN — REGADENOSON 0.4 MG: 0.08 INJECTION, SOLUTION INTRAVENOUS at 09:22

## 2022-03-31 RX ADMIN — TETROFOSMIN 1 DOSE: 1.38 INJECTION, POWDER, LYOPHILIZED, FOR SOLUTION INTRAVENOUS at 08:15

## 2022-03-31 RX ADMIN — IPRATROPIUM BROMIDE AND ALBUTEROL SULFATE 3 ML: 2.5; .5 SOLUTION RESPIRATORY (INHALATION) at 11:40

## 2022-03-31 RX ADMIN — TETROFOSMIN 1 DOSE: 1.38 INJECTION, POWDER, LYOPHILIZED, FOR SOLUTION INTRAVENOUS at 09:23

## 2022-03-31 RX ADMIN — GUAIFENESIN 1200 MG: 600 TABLET ORAL at 11:13

## 2022-03-31 RX ADMIN — FOLIC ACID 1 MG: 1 TABLET ORAL at 11:13

## 2022-03-31 RX ADMIN — IPRATROPIUM BROMIDE AND ALBUTEROL SULFATE 3 ML: 2.5; .5 SOLUTION RESPIRATORY (INHALATION) at 06:30

## 2022-04-05 ENCOUNTER — READMISSION MANAGEMENT (OUTPATIENT)
Dept: CALL CENTER | Facility: HOSPITAL | Age: 67
End: 2022-04-05

## 2022-04-05 NOTE — OUTREACH NOTE
Medical Week 1 Survey    Flowsheet Row Responses   [facility] patient discharged from? [facility]   Does the patient have one of the following disease processes/diagnoses(primary or secondary)? Other   Week 1 attempt successful? Yes   Call start time 0919   Call end time 0922   Discharge diagnosis Abdominal pain   Person spoke with today (if not patient) and relationship Wife   Meds reviewed with patient/caregiver? Yes   Is the patient having any side effects they believe may be caused by any medication additions or changes? No   Does the patient have all medications ordered at discharge? Yes   Is the patient taking all medications as directed (includes completed medication regime)? Yes   Does the patient have a primary care provider?  Yes   Does the patient have an appointment with their PCP within 7 days of discharge? Yes   Comments regarding PCP 4/6/22   Has the patient kept scheduled appointments due by today? N/A   Has home health visited the patient within 72 hours of discharge? N/A   Psychosocial issues? No   Did the patient receive a copy of their discharge instructions? Yes   Nursing interventions Reviewed instructions with patient   What is the patient's perception of their health status since discharge? Improving   Is the patient/caregiver able to teach back signs and symptoms related to disease process for when to call PCP? Yes   Is the patient/caregiver able to teach back signs and symptoms related to disease process for when to call 911? Yes   Is the patient/caregiver able to teach back the hierarchy of who to call/visit for symptoms/problems? PCP, Specialist, Home health nurse, Urgent Care, ED, 911 Yes   If the patient is a current smoker, are they able to teach back resources for cessation? Not a smoker   Week 1 call completed? Yes   Wrap up additional comments Wife reports Pharm questioned if Pt should continue the enbrel med. Advised wife to discuss with PCP as they have an appt tomorrow. Wife  AFIA PUTNAM - Registered Nurse

## 2022-04-11 ENCOUNTER — HOSPITAL ENCOUNTER (EMERGENCY)
Facility: HOSPITAL | Age: 67
Discharge: HOME | End: 2022-04-11
Attending: EMERGENCY MEDICINE | Admitting: EMERGENCY MEDICINE

## 2022-04-11 ENCOUNTER — APPOINTMENT (OUTPATIENT)
Dept: GENERAL RADIOLOGY | Facility: HOSPITAL | Age: 67
End: 2022-04-11

## 2022-04-11 VITALS
HEART RATE: 83 BPM | DIASTOLIC BLOOD PRESSURE: 80 MMHG | OXYGEN SATURATION: 93 % | RESPIRATION RATE: 18 BRPM | HEIGHT: 68 IN | SYSTOLIC BLOOD PRESSURE: 106 MMHG | TEMPERATURE: 97.7 F | BODY MASS INDEX: 18.58 KG/M2 | WEIGHT: 122.58 LBS

## 2022-04-11 DIAGNOSIS — I49.3 PVC'S (PREMATURE VENTRICULAR CONTRACTIONS): Primary | ICD-10-CM

## 2022-04-11 LAB
ALBUMIN SERPL-MCNC: 3.5 G/DL (ref 3.5–5.2)
ALBUMIN/GLOB SERPL: 0.7 G/DL
ALP SERPL-CCNC: 80 U/L (ref 39–117)
ALT SERPL W P-5'-P-CCNC: 13 U/L (ref 1–41)
ANION GAP SERPL CALCULATED.3IONS-SCNC: 8.9 MMOL/L (ref 5–15)
AST SERPL-CCNC: 15 U/L (ref 1–40)
BASOPHILS # BLD AUTO: 0.07 10*3/MM3 (ref 0–0.2)
BASOPHILS NFR BLD AUTO: 0.5 % (ref 0–1.5)
BH CV IMMEDIATE POST TECH DATA BLOOD PRESSURE: NORMAL MMHG
BH CV IMMEDIATE POST TECH DATA HEART RATE: 95 BPM
BH CV IMMEDIATE POST TECH DATA OXYGEN SATS: 99 %
BH CV REST NUCLEAR ISOTOPE DOSE: 9.3 MCI
BH CV SIX MINUTE RECOVERY TECH DATA BLOOD PRESSURE: NORMAL
BH CV SIX MINUTE RECOVERY TECH DATA HEART RATE: 92 BPM
BH CV SIX MINUTE RECOVERY TECH DATA OXYGEN SATURATION: 99 %
BH CV STRESS BP STAGE 1: NORMAL
BH CV STRESS COMMENTS STAGE 1: NORMAL
BH CV STRESS DOSE REGADENOSON STAGE 1: 0.4
BH CV STRESS DURATION MIN STAGE 1: 0
BH CV STRESS DURATION SEC STAGE 1: 10
BH CV STRESS HR STAGE 1: 73
BH CV STRESS NUCLEAR ISOTOPE DOSE: 37.5 MCI
BH CV STRESS O2 STAGE 1: 99
BH CV STRESS PROTOCOL 1: NORMAL
BH CV STRESS RECOVERY BP: NORMAL MMHG
BH CV STRESS RECOVERY HR: 92 BPM
BH CV STRESS RECOVERY O2: 99 %
BH CV STRESS STAGE 1: 1
BH CV THREE MINUTE POST TECH DATA BLOOD PRESSURE: NORMAL MMHG
BH CV THREE MINUTE POST TECH DATA HEART RATE: 92 BPM
BH CV THREE MINUTE POST TECH DATA OXYGEN SATURATION: 99 %
BILIRUB SERPL-MCNC: 0.3 MG/DL (ref 0–1.2)
BUN SERPL-MCNC: 12 MG/DL (ref 8–23)
BUN/CREAT SERPL: 15.6 (ref 7–25)
CALCIUM SPEC-SCNC: 9.4 MG/DL (ref 8.6–10.5)
CHLORIDE SERPL-SCNC: 99 MMOL/L (ref 98–107)
CK MB SERPL-CCNC: 2.88 NG/ML
CK SERPL-CCNC: 84 U/L (ref 20–200)
CO2 SERPL-SCNC: 23.1 MMOL/L (ref 22–29)
CREAT SERPL-MCNC: 0.77 MG/DL (ref 0.76–1.27)
DEPRECATED RDW RBC AUTO: 53.6 FL (ref 37–54)
EGFRCR SERPLBLD CKD-EPI 2021: 98.1 ML/MIN/1.73
EOSINOPHIL # BLD AUTO: 0.06 10*3/MM3 (ref 0–0.4)
EOSINOPHIL NFR BLD AUTO: 0.5 % (ref 0.3–6.2)
ERYTHROCYTE [DISTWIDTH] IN BLOOD BY AUTOMATED COUNT: 15.5 % (ref 12.3–15.4)
GLOBULIN UR ELPH-MCNC: 5.2 GM/DL
GLUCOSE SERPL-MCNC: 110 MG/DL (ref 65–99)
HCT VFR BLD AUTO: 36.6 % (ref 37.5–51)
HGB BLD-MCNC: 12.3 G/DL (ref 13–17.7)
HOLD SPECIMEN: NORMAL
IMM GRANULOCYTES # BLD AUTO: 0.07 10*3/MM3 (ref 0–0.05)
IMM GRANULOCYTES NFR BLD AUTO: 0.5 % (ref 0–0.5)
INR PPP: 1.07 (ref 2–3)
LIPASE SERPL-CCNC: 14 U/L (ref 13–60)
LV EF NUC BP: 31 %
LYMPHOCYTES # BLD AUTO: 2.61 10*3/MM3 (ref 0.7–3.1)
LYMPHOCYTES NFR BLD AUTO: 20 % (ref 19.6–45.3)
MAGNESIUM SERPL-MCNC: 2 MG/DL (ref 1.6–2.4)
MAXIMAL PREDICTED HEART RATE: 153 BPM
MCH RBC QN AUTO: 31.2 PG (ref 26.6–33)
MCHC RBC AUTO-ENTMCNC: 33.6 G/DL (ref 31.5–35.7)
MCV RBC AUTO: 92.9 FL (ref 79–97)
MONOCYTES # BLD AUTO: 0.51 10*3/MM3 (ref 0.1–0.9)
MONOCYTES NFR BLD AUTO: 3.9 % (ref 5–12)
NEUTROPHILS NFR BLD AUTO: 74.6 % (ref 42.7–76)
NEUTROPHILS NFR BLD AUTO: 9.75 10*3/MM3 (ref 1.7–7)
NRBC BLD AUTO-RTO: 0 /100 WBC (ref 0–0.2)
NT-PROBNP SERPL-MCNC: 1305 PG/ML (ref 0–900)
PERCENT MAX PREDICTED HR: 62.09 %
PLATELET # BLD AUTO: 478 10*3/MM3 (ref 140–450)
PMV BLD AUTO: 9.1 FL (ref 6–12)
POTASSIUM SERPL-SCNC: 4.1 MMOL/L (ref 3.5–5.2)
PROT SERPL-MCNC: 8.7 G/DL (ref 6–8.5)
PROTHROMBIN TIME: 11.1 SECONDS (ref 9.4–12)
QT INTERVAL: 412 MS
RBC # BLD AUTO: 3.94 10*6/MM3 (ref 4.14–5.8)
SODIUM SERPL-SCNC: 131 MMOL/L (ref 136–145)
STRESS BASELINE BP: NORMAL MMHG
STRESS BASELINE HR: 73 BPM
STRESS O2 SAT REST: 98 %
STRESS PERCENT HR: 73 %
STRESS POST O2 SAT PEAK: 99 %
STRESS POST PEAK BP: NORMAL MMHG
STRESS POST PEAK HR: 95 BPM
STRESS TARGET HR: 130 BPM
TROPONIN I SERPL-MCNC: 0.03 NG/ML (ref 0–0.6)
TROPONIN I SERPL-MCNC: 0.04 NG/ML (ref 0–0.6)
TROPONIN T SERPL-MCNC: 0.02 NG/ML (ref 0–0.03)
TSH SERPL DL<=0.05 MIU/L-ACNC: 2.12 UIU/ML (ref 0.27–4.2)
WBC NRBC COR # BLD: 13.07 10*3/MM3 (ref 3.4–10.8)
WHOLE BLOOD HOLD SPECIMEN: NORMAL
WHOLE BLOOD HOLD SPECIMEN: NORMAL

## 2022-04-11 PROCEDURE — 84484 ASSAY OF TROPONIN QUANT: CPT

## 2022-04-11 PROCEDURE — 83880 ASSAY OF NATRIURETIC PEPTIDE: CPT

## 2022-04-11 PROCEDURE — 85025 COMPLETE CBC W/AUTO DIFF WBC: CPT

## 2022-04-11 PROCEDURE — 82550 ASSAY OF CK (CPK): CPT

## 2022-04-11 PROCEDURE — 84443 ASSAY THYROID STIM HORMONE: CPT | Performed by: EMERGENCY MEDICINE

## 2022-04-11 PROCEDURE — 84484 ASSAY OF TROPONIN QUANT: CPT | Performed by: EMERGENCY MEDICINE

## 2022-04-11 PROCEDURE — 93005 ELECTROCARDIOGRAM TRACING: CPT

## 2022-04-11 PROCEDURE — 36415 COLL VENOUS BLD VENIPUNCTURE: CPT

## 2022-04-11 PROCEDURE — 71045 X-RAY EXAM CHEST 1 VIEW: CPT

## 2022-04-11 PROCEDURE — 82553 CREATINE MB FRACTION: CPT

## 2022-04-11 PROCEDURE — 99284 EMERGENCY DEPT VISIT MOD MDM: CPT

## 2022-04-11 PROCEDURE — 83690 ASSAY OF LIPASE: CPT

## 2022-04-11 PROCEDURE — 83735 ASSAY OF MAGNESIUM: CPT

## 2022-04-11 PROCEDURE — 85610 PROTHROMBIN TIME: CPT | Performed by: EMERGENCY MEDICINE

## 2022-04-11 PROCEDURE — 80053 COMPREHEN METABOLIC PANEL: CPT

## 2022-04-11 PROCEDURE — 93005 ELECTROCARDIOGRAM TRACING: CPT | Performed by: EMERGENCY MEDICINE

## 2022-04-11 RX ORDER — ASPIRIN 81 MG/1
324 TABLET, CHEWABLE ORAL ONCE
Status: COMPLETED | OUTPATIENT
Start: 2022-04-11 | End: 2022-04-11

## 2022-04-11 RX ORDER — MAGNESIUM OXIDE 400 MG/1
400 TABLET ORAL DAILY
Qty: 30 TABLET | Refills: 0 | Status: ON HOLD | OUTPATIENT
Start: 2022-04-11 | End: 2022-06-27

## 2022-04-11 RX ORDER — SODIUM CHLORIDE 0.9 % (FLUSH) 0.9 %
10 SYRINGE (ML) INJECTION AS NEEDED
Status: DISCONTINUED | OUTPATIENT
Start: 2022-04-11 | End: 2022-04-11 | Stop reason: HOSPADM

## 2022-04-11 RX ADMIN — ASPIRIN 81 MG CHEWABLE TABLET 324 MG: 81 TABLET CHEWABLE at 12:47

## 2022-04-11 NOTE — ED PROVIDER NOTES
Time: 1:06 PM EDT  Arrived by: ELIUD  Chief Complaint: Irregular heart rhythm  History provided by: Patient  History is limited by: N/A     History of Present Illness:  Patient is a 67 y.o. male that presents to the emergency department with irregular heart rhythm. The pt was sent here for further evaluation of irregular heart rhythm by his pulmonologist. The pt states that it feels like his heart is fluttering, and endorses palpitations. The pt reports the palpitations worsen whenever he sits still for awhile and then gets up to move around, or whenever he moves around a lot for extended periods of time. His symptoms are moderate in severity.     No CP, SOA. No nausea, vomiting, or diaphoresis. Endorses cough. No fever. Endorses mild abdominal pain, but notes that he received shots in his stomach (admitted from 3/26/22 to  3/31/22).     Placed on two new medications during admission.       History provided by:  Patient   used: No    Palpitations  Palpitations quality: Fluttering.  Onset quality:  With exertion  Timing:  Intermittent  Progression:  Unable to specify  Chronicity:  New  Relieved by:  Bed rest  Worsened by:  Nothing  Ineffective treatments:  None tried  Associated symptoms: cough    Associated symptoms: no chest pain, no diaphoresis, no lower extremity edema, no malaise/fatigue, no nausea, no shortness of breath and no vomiting        Similar Symptoms Previously: N/a  Recently seen: Seen by Dr. Eaton, Pulmonology, today       Patient Care Team  Primary Care Provider: Shelly Mejia MD    Past Medical History:     Allergies   Allergen Reactions   • Penicillins Hives     History reviewed. No pertinent past medical history.  Past Surgical History:   Procedure Laterality Date   • ENDOSCOPY N/A 3/29/2022    Procedure: ESOPHAGOGASTRODUODENOSCOPY WITH BIOPSIES;  Surgeon: Min Campbell MD;  Location: [facility] ENDOSCOPY;  Service: Gastroenterology;  Laterality: N/A;  HIATAL HERNIA      History reviewed. No pertinent family history.    Home Medications:  Prior to Admission medications    Medication Sig Start Date End Date Taking? Authorizing Provider   albuterol sulfate  (90 Base) MCG/ACT inhaler Inhale 2 puffs Every 4 (Four) Hours As Needed for Wheezing for up to 30 days. 3/31/22 4/30/22  Lazaro May MD   aspirin 81 MG EC tablet Take 81 mg by mouth Daily.    Kat Aguilar MD   Etanercept (Enbrel Mini) 50 MG/ML solution cartridge Inject 50 mg under the skin into the appropriate area as directed Every 7 (Seven) Days. mon    Kat Aguilar MD   famotidine (PEPCID) 20 MG tablet Every 12 (Twelve) Hours.    Kat Aguilar MD   folic acid (FOLVITE) 1 MG tablet Take 1 mg by mouth Daily. 12/20/21   Kat Aguilar MD   guaiFENesin (Mucinex) 600 MG 12 hr tablet Take 2 tablets by mouth 2 (Two) Times a Day As Needed for Cough or Congestion for up to 10 days. 3/31/22 4/10/22  Lazaro May MD   linaclotide (Linzess) 145 MCG capsule capsule Take 145 mcg by mouth Every Morning Before Breakfast.    Kat Aguilar MD   methotrexate 2.5 MG tablet Take 12.5 mg by mouth 1 (One) Time Per Week. Friday    Kat Aguilar MD   metoprolol succinate XL (TOPROL-XL) 25 MG 24 hr tablet Take 1 tablet by mouth Daily.    Kat Aguilar MD   oxyCODONE-acetaminophen (PERCOCET) 7.5-325 MG per tablet Take 1 tablet by mouth 2 (Two) Times a Day As Needed. 2/14/22   Kat Aguilar MD   Testosterone Enanthate 200 MG/ML solution Inject 200 mg into the appropriate muscle as directed by prescriber Every 7 (Seven) Days. wed    Kat Aguilar MD        Social History:   Social History     Tobacco Use   • Smoking status: Never Smoker   • Smokeless tobacco: Never Used   Substance Use Topics   • Alcohol use: Not Currently   • Drug use: Never     Recent travel: no     Review of Systems:  Review of Systems   Constitutional: Negative for diaphoresis and malaise/fatigue.  "  HENT: Negative for congestion and rhinorrhea.    Eyes: Negative for pain and visual disturbance.   Respiratory: Positive for cough. Negative for apnea, chest tightness and shortness of breath.    Cardiovascular: Positive for palpitations. Negative for chest pain.   Gastrointestinal: Negative for nausea and vomiting.   Genitourinary: Negative for difficulty urinating.   Musculoskeletal: Negative for joint swelling and myalgias.   Skin: Negative for color change.   Neurological: Negative for seizures and headaches.   Psychiatric/Behavioral: Negative.    All other systems reviewed and are negative.       Physical Exam:  /66   Pulse 83   Temp 97.7 °F (36.5 °C) (Oral)   Resp 18   Ht 172.7 cm (68\")   Wt 55.6 kg (122 lb 9.2 oz)   SpO2 100%   BMI 18.64 kg/m²     Physical Exam  Vitals and nursing note reviewed.   Constitutional:       General: He is not in acute distress.     Appearance: Normal appearance. He is not toxic-appearing.   HENT:      Head: Normocephalic and atraumatic.      Jaw: There is normal jaw occlusion.   Eyes:      General: Lids are normal.      Extraocular Movements: Extraocular movements intact.      Conjunctiva/sclera: Conjunctivae normal.      Pupils: Pupils are equal, round, and reactive to light.   Cardiovascular:      Rate and Rhythm: Normal rate and regular rhythm.      Pulses: Normal pulses.      Heart sounds: Normal heart sounds.   Pulmonary:      Effort: Pulmonary effort is normal. No respiratory distress.      Breath sounds: Normal breath sounds. No wheezing or rhonchi.   Abdominal:      General: Abdomen is flat.      Palpations: Abdomen is soft.      Tenderness: There is no abdominal tenderness. There is no guarding or rebound.   Musculoskeletal:         General: Normal range of motion.      Cervical back: Normal range of motion and neck supple.      Right lower leg: No edema.      Left lower leg: No edema.   Skin:     General: Skin is warm and dry.   Neurological:      Mental " Status: He is alert and oriented to person, place, and time. Mental status is at baseline.   Psychiatric:         Mood and Affect: Mood normal.                Medications in the Emergency Department:  Medications   sodium chloride 0.9 % flush 10 mL (has no administration in time range)   sodium chloride 0.9 % flush 10 mL (has no administration in time range)   aspirin chewable tablet 324 mg (324 mg Oral Given 4/11/22 1247)        Labs  Lab Results (last 24 hours)     Procedure Component Value Units Date/Time    CBC & Differential [[id number]]  (Abnormal) Collected: 04/11/22 1119    Specimen: Blood Updated: 04/11/22 1155    Narrative:      The following orders were created for panel order CBC & Differential.  Procedure                               Abnormality         Status                     ---------                               -----------         ------                     CBC Auto Differential[[id number]]        Abnormal            Final result                 Please view results for these tests on the individual orders.    Comprehensive Metabolic Panel [[id number]]  (Abnormal) Collected: 04/11/22 1119    Specimen: Blood Updated: 04/11/22 1213     Glucose 110 mg/dL      BUN 12 mg/dL      Creatinine 0.77 mg/dL      Sodium 131 mmol/L      Potassium 4.1 mmol/L      Chloride 99 mmol/L      CO2 23.1 mmol/L      Calcium 9.4 mg/dL      Total Protein 8.7 g/dL      Albumin 3.50 g/dL      ALT (SGPT) 13 U/L      AST (SGOT) 15 U/L      Alkaline Phosphatase 80 U/L      Total Bilirubin 0.3 mg/dL      Globulin 5.2 gm/dL      A/G Ratio 0.7 g/dL      BUN/Creatinine Ratio 15.6     Anion Gap 8.9 mmol/L      eGFR 98.1 mL/min/1.73      Comment: National Kidney Foundation and American Society of Nephrology (ASN) Task Force recommended calculation based on the Chronic Kidney Disease Epidemiology Collaboration (CKD-EPI) equation refit without adjustment for race.       Narrative:      GFR Normal >60  Chronic Kidney Disease <60  Kidney  Failure <15      Lipase [[id number]]  (Normal) Collected: 04/11/22 1119    Specimen: Blood Updated: 04/11/22 1213     Lipase 14 U/L     BNP [[id number]]  (Abnormal) Collected: 04/11/22 1119    Specimen: Blood Updated: 04/11/22 1219     proBNP 1,305.0 pg/mL     Narrative:      Among patients with dyspnea, NT-proBNP is highly sensitive for the detection of acute congestive heart failure. In addition NT-proBNP of <300 pg/ml effectively rules out acute congestive heart failure with 99% negative predictive value.    Results may be falsely decreased if patient taking Biotin.      Magnesium [[id number]]  (Normal) Collected: 04/11/22 1119    Specimen: Blood Updated: 04/11/22 1213     Magnesium 2.0 mg/dL     CK Total & CKMB [[id number]]  (Normal) Collected: 04/11/22 1119    Specimen: Blood Updated: 04/11/22 1219     CKMB 2.88 ng/mL      Creatine Kinase 84 U/L     Narrative:      CKMB results may be falsely decreased if patient taking Biotin.    CBC Auto Differential [[id number]]  (Abnormal) Collected: 04/11/22 1119    Specimen: Blood Updated: 04/11/22 1155     WBC 13.07 10*3/mm3      RBC 3.94 10*6/mm3      Hemoglobin 12.3 g/dL      Hematocrit 36.6 %      MCV 92.9 fL      MCH 31.2 pg      MCHC 33.6 g/dL      RDW 15.5 %      RDW-SD 53.6 fl      MPV 9.1 fL      Platelets 478 10*3/mm3      Neutrophil % 74.6 %      Lymphocyte % 20.0 %      Monocyte % 3.9 %      Eosinophil % 0.5 %      Basophil % 0.5 %      Immature Grans % 0.5 %      Neutrophils, Absolute 9.75 10*3/mm3      Lymphocytes, Absolute 2.61 10*3/mm3      Monocytes, Absolute 0.51 10*3/mm3      Eosinophils, Absolute 0.06 10*3/mm3      Basophils, Absolute 0.07 10*3/mm3      Immature Grans, Absolute 0.07 10*3/mm3      nRBC 0.0 /100 WBC     Troponin [[id number]]  (Normal) Collected: 04/11/22 1119    Specimen: Blood Updated: 04/11/22 1219     Troponin T 0.022 ng/mL     Narrative:      Troponin T Reference Range:  <= 0.03 ng/mL-   Negative for AMI  >0.03 ng/mL-     Abnormal for  myocardial necrosis.  Clinicians would have to utilize clinical acumen, EKG, Troponin and serial changes to determine if it is an Acute Myocardial Infarction or myocardial injury due to an underlying chronic condition.       Results may be falsely decreased if patient taking Biotin.      Protime-INR [[id number]]  (Abnormal) Collected: 04/11/22 1119    Specimen: Blood Updated: 04/11/22 1212     Protime 11.1 Seconds      INR 1.07    Narrative:      Suggested Therapeutic Ranges For Oral Anticoagulant Therapy:  Level of Therapy                      INR Target Range  Standard Dose                            2.0-3.0  High Dose                                2.5-3.5  Patients not receiving anticoagulant  Therapy Normal Range                     0.6-1.2    TSH [[id number]]  (Normal) Collected: 04/11/22 1119    Specimen: Blood Updated: 04/11/22 1219     TSH 2.120 uIU/mL     POC Troponin I [[id number]]  (Normal) Collected: 04/11/22 1130    Specimen: Blood Updated: 04/11/22 1142     Troponin I 0.04 ng/mL      Comment: Serial Number: [id number]:  [id number]       POC Troponin I with Hold Tube [[id number]] Collected: 04/11/22 1403    Specimen: Blood Updated: 04/11/22 1457    Narrative:      The following orders were created for panel order POC Troponin I with Hold Tube.  Procedure                               Abnormality         Status                     ---------                               -----------         ------                     POC Troponin I[[id number]]                                                              HOLD Troponin-I Tube[[id number]]                             In process                   Please view results for these tests on the individual orders.    POC Troponin I [[id number]]  (Normal) Collected: 04/11/22 1439    Specimen: Blood Updated: 04/11/22 1452     Troponin I 0.03 ng/mL      Comment: Serial Number: [id number]:  [id number]              Imaging:  XR Chest 1 View    Result Date:  4/11/2022  PROCEDURE: XR CHEST 1 VW  COMPARISON: [facility], CR, XR CHEST 1 VW, 3/26/2022, 13:31.  INDICATIONS: CHEST PAIN, IRREGULAR HEART RATE  FINDINGS:  The heart size and pulmonary vascular markings are normal.  There are small pleural effusions greater on the right than the left.  There is mild bilateral basilar airspace disease which is improved from prior study.       Improvement of bilateral basilar infiltrates with persistent small effusions greater on the right than the left.  Aeration is improved but residual pneumonia is suspected.       RADHA BORRERO MD       Electronically Signed and Approved By: RADHA BORRERO MD on 4/11/2022 at 13:01               Procedures:  Procedures    Progress  ED Course as of 04/11/22 1554   Mon Apr 11, 2022   1315 EKG Interpreted by me: NSR, 87, Nl axis, incomplete LBBB, no ST elevation [SB]      ED Course User Index  [SB] Parth Harris                            Medical Decision Making:  MDM  Number of Diagnoses or Management Options  PVC's (premature ventricular contractions)  Diagnosis management comments: I reviewed the pt's medical records, notably his discharge summary from 3/31/22.    The patient was evaluated in the emergency department for palpitations. The patient had an EKG that shows no acute changes. Specifically, there are no ST elevations, t-wave changes of concern, delta waves, or rhythm abnormalities (atrial fibrillation, atrial flutter, SVT, ventricular tachycardia) warranting admission. The patient was placed on the cardiac monitor and observed with continuous telemetry. The patient has a chest x-ray that is negative for pneumothorax, pneumonia, and is essentially unremarkable. The patient has a normal troponin level. The patient was counseled that these palpitations may either be premature atrial contractions or premature ventricular contractions. The patient was counseled to increase their sleep and decrease their stress if possible.  The patient was also counseled to decrease caffeine intake. The patient was advised to abstain from cold medications, diet pills, and ``natural´´ vitamin supplements with stimulants. The patient was counseled to follow up with a primary care physician or cardiologist for the possibility of wearing a Holter monitor within the next 2-3 days.      The patient is resting comfortably and feels better, is alert and in no distress.  The patient´s CBC was reviewed and shows no abnormalities of critical concern. Of note, there is no anemia requiring a blood transfusion and the platelet count is acceptable.  The patient´s CMP was reviewed and shows no abnormalities of critical concern. Of note, the patient´s sodium and potassium are acceptable. The patient´s liver enzymes are unremarkable. The patient´s renal function (creatinine) is preserved. The patient has a normal anion gap.  Troponin is negative.  Chest x-ray does show improved infiltrates the repeat examination is unremarkable and benign. Electrocardiogram shows no signs of acute ischemia and the history, exam, diagnostic testing and current condition did not suggest that this patient is having an acute myocardial infarction, significant arrhythmia, unstable angina, esophageal perforation, pulmonary embolism, aortic dissection, severe pneumonia, sepsis for other significant pathology that would warrant further testing, continued ED treatment, admission, cardiology or other specialist consultation at this point.  Case was discussed with Dr. Matheus Argueta who recommends starting the patient on magnesium oxide and having the patient follow-up with him at his scheduled appointment on May 3.  The vital signs have been stable. The patient's condition is stable and appropriate for discharge. The patient has expressed a clear and thorough understanding and agreed to follow-up as instructed.          Amount and/or Complexity of Data Reviewed  Clinical lab tests: reviewed  Tests in  the radiology section of CPT®: reviewed  Tests in the medicine section of CPT®: reviewed  Discussion of test results with the performing providers: yes  Review and summarize past medical records: yes  Discuss the patient with other providers: yes  Independent visualization of images, tracings, or specimens: yes    Risk of Complications, Morbidity, and/or Mortality  Presenting problems: moderate  Management options: moderate    Patient Progress  Patient progress: stable       Final diagnoses:   PVC's (premature ventricular contractions)        Disposition:  ED Disposition     ED Disposition   Discharge    Condition   Stable    Comment   --             Documentation assistance provided by Parth Harris acting as scribe for Yung Martinez MD  . Information recorded by the scribe was done at my direction and has been verified and validated by me.        Parth Harris  04/11/22 1319       Parth Harris  04/11/22 1415       Parth Harris  04/11/22 1419       Yung Martinez MD  04/11/22 1554       Yung Martinez MD  04/11/22 1555

## 2022-04-12 LAB
QT INTERVAL: 403 MS
QT INTERVAL: 405 MS

## 2022-04-13 ENCOUNTER — READMISSION MANAGEMENT (OUTPATIENT)
Dept: CALL CENTER | Facility: HOSPITAL | Age: 67
End: 2022-04-13

## 2022-04-13 NOTE — OUTREACH NOTE
Medical Week 2 Survey    Flowsheet Row Responses   [facility] facility patient discharged from? [facility]   Does the patient have one of the following disease processes/diagnoses(primary or secondary)? Other   Week 2 attempt successful? No   Unsuccessful attempts Attempt 1          JEROME HAMILTON - Registered Nurse

## 2022-04-15 ENCOUNTER — TELEPHONE (OUTPATIENT)
Dept: GASTROENTEROLOGY | Facility: CLINIC | Age: 67
End: 2022-04-15

## 2022-04-15 NOTE — TELEPHONE ENCOUNTER
----- Message from AVELINO Brar sent at 4/14/2022  3:40 PM EDT -----  Esophageal bx w reflux esophagitis otherwise neg; per Dr Campbell pt needs colonoscopy for anemia

## 2022-04-18 ENCOUNTER — READMISSION MANAGEMENT (OUTPATIENT)
Dept: CALL CENTER | Facility: HOSPITAL | Age: 67
End: 2022-04-18

## 2022-04-18 NOTE — OUTREACH NOTE
Medical Week 2 Survey    Flowsheet Row Responses   [facility] patient discharged from? [facility]   Does the patient have one of the following disease processes/diagnoses(primary or secondary)? Other   Week 2 attempt successful? No   Unsuccessful attempts Attempt 2          LAKHWINDER AHUJA - Licensed Nurse

## 2022-04-20 ENCOUNTER — PREP FOR SURGERY (OUTPATIENT)
Dept: OTHER | Facility: HOSPITAL | Age: 67
End: 2022-04-20

## 2022-04-20 DIAGNOSIS — D64.9 ANEMIA, UNSPECIFIED TYPE: Primary | ICD-10-CM

## 2022-04-20 NOTE — TELEPHONE ENCOUNTER
Spoke to pt and spouse and informed of Mindy YOU result note. Verified understanding. Request procedure be scheduled on a Monday or Tuesday.   Case Request entered for 2nd sign.

## 2022-04-21 ENCOUNTER — PREP FOR SURGERY (OUTPATIENT)
Dept: OTHER | Facility: HOSPITAL | Age: 67
End: 2022-04-21

## 2022-04-21 DIAGNOSIS — D50.9 IRON DEFICIENCY ANEMIA, UNSPECIFIED IRON DEFICIENCY ANEMIA TYPE: ICD-10-CM

## 2022-04-21 DIAGNOSIS — D64.9 ANEMIA, UNSPECIFIED TYPE: Primary | ICD-10-CM

## 2022-04-22 PROBLEM — D50.9 IRON DEFICIENCY ANEMIA: Status: ACTIVE | Noted: 2022-04-22

## 2022-04-22 PROBLEM — D64.9 ANEMIA: Status: ACTIVE | Noted: 2022-04-22

## 2022-04-25 NOTE — TELEPHONE ENCOUNTER
Pt called office on 4/22/2022. [facility] scheduled pt for Colonoscopy on 06/27/2022 arrival time 0800.

## 2022-04-26 ENCOUNTER — LAB (OUTPATIENT)
Dept: LAB | Facility: HOSPITAL | Age: 67
End: 2022-04-26

## 2022-04-26 ENCOUNTER — TRANSCRIBE ORDERS (OUTPATIENT)
Dept: LAB | Facility: HOSPITAL | Age: 67
End: 2022-04-26

## 2022-04-26 DIAGNOSIS — M05.79 SEROPOSITIVE RHEUMATOID ARTHRITIS OF MULTIPLE SITES: ICD-10-CM

## 2022-04-26 DIAGNOSIS — Z79.899 ENCOUNTER FOR LONG-TERM (CURRENT) USE OF OTHER MEDICATIONS: ICD-10-CM

## 2022-04-26 DIAGNOSIS — M25.50 PAIN IN JOINT, MULTIPLE SITES: ICD-10-CM

## 2022-04-26 DIAGNOSIS — M25.50 PAIN IN JOINT, MULTIPLE SITES: Primary | ICD-10-CM

## 2022-04-26 LAB
ALBUMIN SERPL-MCNC: 3.1 G/DL (ref 3.5–5.2)
ALT SERPL W P-5'-P-CCNC: <5 U/L (ref 1–41)
AST SERPL-CCNC: 15 U/L (ref 1–40)
BASOPHILS # BLD AUTO: 0.05 10*3/MM3 (ref 0–0.2)
BASOPHILS NFR BLD AUTO: 0.5 % (ref 0–1.5)
BUN SERPL-MCNC: 8 MG/DL (ref 8–23)
CK SERPL-CCNC: 66 U/L (ref 20–200)
CREAT SERPL-MCNC: 0.74 MG/DL (ref 0.76–1.27)
CRP SERPL-MCNC: 8.09 MG/DL (ref 0–0.5)
DEPRECATED RDW RBC AUTO: 47.8 FL (ref 37–54)
EGFRCR SERPLBLD CKD-EPI 2021: 99.3 ML/MIN/1.73
EOSINOPHIL # BLD AUTO: 0.11 10*3/MM3 (ref 0–0.4)
EOSINOPHIL NFR BLD AUTO: 1.1 % (ref 0.3–6.2)
ERYTHROCYTE [DISTWIDTH] IN BLOOD BY AUTOMATED COUNT: 14.5 % (ref 12.3–15.4)
HCT VFR BLD AUTO: 35.1 % (ref 37.5–51)
HGB BLD-MCNC: 11.4 G/DL (ref 13–17.7)
IMM GRANULOCYTES # BLD AUTO: 0.05 10*3/MM3 (ref 0–0.05)
IMM GRANULOCYTES NFR BLD AUTO: 0.5 % (ref 0–0.5)
LYMPHOCYTES # BLD AUTO: 2.48 10*3/MM3 (ref 0.7–3.1)
LYMPHOCYTES NFR BLD AUTO: 23.8 % (ref 19.6–45.3)
MCH RBC QN AUTO: 29.6 PG (ref 26.6–33)
MCHC RBC AUTO-ENTMCNC: 32.5 G/DL (ref 31.5–35.7)
MCV RBC AUTO: 91.2 FL (ref 79–97)
MONOCYTES # BLD AUTO: 0.43 10*3/MM3 (ref 0.1–0.9)
MONOCYTES NFR BLD AUTO: 4.1 % (ref 5–12)
NEUTROPHILS NFR BLD AUTO: 7.29 10*3/MM3 (ref 1.7–7)
NEUTROPHILS NFR BLD AUTO: 70 % (ref 42.7–76)
NRBC BLD AUTO-RTO: 0 /100 WBC (ref 0–0.2)
PLATELET # BLD AUTO: 467 10*3/MM3 (ref 140–450)
PMV BLD AUTO: 9.3 FL (ref 6–12)
RBC # BLD AUTO: 3.85 10*6/MM3 (ref 4.14–5.8)
URATE SERPL-MCNC: 7.3 MG/DL (ref 3.4–7)
WBC NRBC COR # BLD: 10.41 10*3/MM3 (ref 3.4–10.8)

## 2022-04-26 PROCEDURE — 84550 ASSAY OF BLOOD/URIC ACID: CPT

## 2022-04-26 PROCEDURE — 84520 ASSAY OF UREA NITROGEN: CPT

## 2022-04-26 PROCEDURE — 82040 ASSAY OF SERUM ALBUMIN: CPT

## 2022-04-26 PROCEDURE — 36415 COLL VENOUS BLD VENIPUNCTURE: CPT

## 2022-04-26 PROCEDURE — 84450 TRANSFERASE (AST) (SGOT): CPT

## 2022-04-26 PROCEDURE — 82550 ASSAY OF CK (CPK): CPT

## 2022-04-26 PROCEDURE — 82565 ASSAY OF CREATININE: CPT

## 2022-04-26 PROCEDURE — 84460 ALANINE AMINO (ALT) (SGPT): CPT

## 2022-04-26 PROCEDURE — 85025 COMPLETE CBC W/AUTO DIFF WBC: CPT

## 2022-04-26 PROCEDURE — 86140 C-REACTIVE PROTEIN: CPT

## 2022-04-27 ENCOUNTER — READMISSION MANAGEMENT (OUTPATIENT)
Dept: CALL CENTER | Facility: HOSPITAL | Age: 67
End: 2022-04-27

## 2022-04-27 NOTE — OUTREACH NOTE
Medical Week 3 Survey    Flowsheet Row Responses   [facility] facility patient discharged from? [facility]   Does the patient have one of the following disease processes/diagnoses(primary or secondary)? Other   Week 3 attempt successful? No   Call start time 1233   Unsuccessful attempts Attempt 1   Discharge diagnosis Abdominal pain          RADHA E - Registered Nurse

## 2022-05-30 ENCOUNTER — HOSPITAL ENCOUNTER (EMERGENCY)
Facility: HOSPITAL | Age: 67
Discharge: HOME | End: 2022-05-30
Attending: EMERGENCY MEDICINE | Admitting: EMERGENCY MEDICINE

## 2022-05-30 ENCOUNTER — APPOINTMENT (OUTPATIENT)
Dept: GENERAL RADIOLOGY | Facility: HOSPITAL | Age: 67
End: 2022-05-30

## 2022-05-30 VITALS
TEMPERATURE: 99.2 F | DIASTOLIC BLOOD PRESSURE: 91 MMHG | OXYGEN SATURATION: 91 % | BODY MASS INDEX: 18.78 KG/M2 | HEIGHT: 68 IN | HEART RATE: 64 BPM | SYSTOLIC BLOOD PRESSURE: 116 MMHG | WEIGHT: 123.9 LBS | RESPIRATION RATE: 18 BRPM

## 2022-05-30 DIAGNOSIS — Z20.822 SUSPECTED COVID-19 VIRUS INFECTION: Primary | ICD-10-CM

## 2022-05-30 DIAGNOSIS — K40.90 RIGHT INGUINAL HERNIA: ICD-10-CM

## 2022-05-30 LAB
ALBUMIN SERPL-MCNC: 3.7 G/DL (ref 3.5–5.2)
ALBUMIN/GLOB SERPL: 0.7 G/DL
ALP SERPL-CCNC: 79 U/L (ref 39–117)
ALT SERPL W P-5'-P-CCNC: 7 U/L (ref 1–41)
ANION GAP SERPL CALCULATED.3IONS-SCNC: 11.2 MMOL/L (ref 5–15)
AST SERPL-CCNC: 13 U/L (ref 1–40)
BASOPHILS # BLD AUTO: 0.03 10*3/MM3 (ref 0–0.2)
BASOPHILS NFR BLD AUTO: 0.6 % (ref 0–1.5)
BILIRUB SERPL-MCNC: 0.2 MG/DL (ref 0–1.2)
BUN SERPL-MCNC: 11 MG/DL (ref 8–23)
BUN/CREAT SERPL: 11.7 (ref 7–25)
CALCIUM SPEC-SCNC: 9.5 MG/DL (ref 8.6–10.5)
CHLORIDE SERPL-SCNC: 96 MMOL/L (ref 98–107)
CO2 SERPL-SCNC: 24.8 MMOL/L (ref 22–29)
CREAT SERPL-MCNC: 0.94 MG/DL (ref 0.76–1.27)
DEPRECATED RDW RBC AUTO: 46.5 FL (ref 37–54)
EGFRCR SERPLBLD CKD-EPI 2021: 88.9 ML/MIN/1.73
EOSINOPHIL # BLD AUTO: 0.03 10*3/MM3 (ref 0–0.4)
EOSINOPHIL NFR BLD AUTO: 0.6 % (ref 0.3–6.2)
ERYTHROCYTE [DISTWIDTH] IN BLOOD BY AUTOMATED COUNT: 14.1 % (ref 12.3–15.4)
FLUAV AG NPH QL: NEGATIVE
FLUBV AG NPH QL IA: NEGATIVE
GLOBULIN UR ELPH-MCNC: 5.3 GM/DL
GLUCOSE SERPL-MCNC: 92 MG/DL (ref 65–99)
HCT VFR BLD AUTO: 38.8 % (ref 37.5–51)
HGB BLD-MCNC: 12.6 G/DL (ref 13–17.7)
HOLD SPECIMEN: NORMAL
HOLD SPECIMEN: NORMAL
IMM GRANULOCYTES # BLD AUTO: 0.02 10*3/MM3 (ref 0–0.05)
IMM GRANULOCYTES NFR BLD AUTO: 0.4 % (ref 0–0.5)
LIPASE SERPL-CCNC: 11 U/L (ref 13–60)
LYMPHOCYTES # BLD AUTO: 1.87 10*3/MM3 (ref 0.7–3.1)
LYMPHOCYTES NFR BLD AUTO: 34.6 % (ref 19.6–45.3)
MCH RBC QN AUTO: 29.1 PG (ref 26.6–33)
MCHC RBC AUTO-ENTMCNC: 32.5 G/DL (ref 31.5–35.7)
MCV RBC AUTO: 89.6 FL (ref 79–97)
MONOCYTES # BLD AUTO: 0.63 10*3/MM3 (ref 0.1–0.9)
MONOCYTES NFR BLD AUTO: 11.6 % (ref 5–12)
NEUTROPHILS NFR BLD AUTO: 2.83 10*3/MM3 (ref 1.7–7)
NEUTROPHILS NFR BLD AUTO: 52.2 % (ref 42.7–76)
NRBC BLD AUTO-RTO: 0 /100 WBC (ref 0–0.2)
PLATELET # BLD AUTO: 391 10*3/MM3 (ref 140–450)
PMV BLD AUTO: 9.2 FL (ref 6–12)
POTASSIUM SERPL-SCNC: 4.7 MMOL/L (ref 3.5–5.2)
PROT SERPL-MCNC: 9 G/DL (ref 6–8.5)
RBC # BLD AUTO: 4.33 10*6/MM3 (ref 4.14–5.8)
SARS-COV-2 RNA PNL SPEC NAA+PROBE: DETECTED
SODIUM SERPL-SCNC: 132 MMOL/L (ref 136–145)
WBC NRBC COR # BLD: 5.41 10*3/MM3 (ref 3.4–10.8)
WHOLE BLOOD HOLD COAG: NORMAL
WHOLE BLOOD HOLD SPECIMEN: NORMAL

## 2022-05-30 PROCEDURE — 36415 COLL VENOUS BLD VENIPUNCTURE: CPT | Performed by: EMERGENCY MEDICINE

## 2022-05-30 PROCEDURE — 99283 EMERGENCY DEPT VISIT LOW MDM: CPT

## 2022-05-30 PROCEDURE — 80053 COMPREHEN METABOLIC PANEL: CPT | Performed by: EMERGENCY MEDICINE

## 2022-05-30 PROCEDURE — U0004 COV-19 TEST NON-CDC HGH THRU: HCPCS | Performed by: EMERGENCY MEDICINE

## 2022-05-30 PROCEDURE — 83690 ASSAY OF LIPASE: CPT | Performed by: EMERGENCY MEDICINE

## 2022-05-30 PROCEDURE — 71045 X-RAY EXAM CHEST 1 VIEW: CPT

## 2022-05-30 PROCEDURE — 87804 INFLUENZA ASSAY W/OPTIC: CPT | Performed by: NURSE PRACTITIONER

## 2022-05-30 PROCEDURE — 85025 COMPLETE CBC W/AUTO DIFF WBC: CPT | Performed by: EMERGENCY MEDICINE

## 2022-05-30 RX ORDER — SODIUM CHLORIDE 0.9 % (FLUSH) 0.9 %
10 SYRINGE (ML) INJECTION AS NEEDED
Status: DISCONTINUED | OUTPATIENT
Start: 2022-05-30 | End: 2022-05-30 | Stop reason: HOSPADM

## 2022-06-16 ENCOUNTER — TRANSCRIBE ORDERS (OUTPATIENT)
Dept: ADMINISTRATIVE | Facility: HOSPITAL | Age: 67
End: 2022-06-16

## 2022-06-16 ENCOUNTER — LAB (OUTPATIENT)
Dept: LAB | Facility: HOSPITAL | Age: 67
End: 2022-06-16

## 2022-06-16 DIAGNOSIS — J44.9 COPD WITH RESPIRATORY FAILURE, ACUTE: Primary | ICD-10-CM

## 2022-06-16 DIAGNOSIS — J44.9 COPD WITH RESPIRATORY FAILURE, ACUTE: ICD-10-CM

## 2022-06-16 DIAGNOSIS — R43.8 HYPOSMIA: ICD-10-CM

## 2022-06-16 DIAGNOSIS — J96.00 COPD WITH RESPIRATORY FAILURE, ACUTE: ICD-10-CM

## 2022-06-16 DIAGNOSIS — J96.00 COPD WITH RESPIRATORY FAILURE, ACUTE: Primary | ICD-10-CM

## 2022-06-16 LAB
BASE EXCESS BLDA CALC-SCNC: -2.1 MMOL/L (ref -2–2)
BDY SITE: ABNORMAL
COHGB MFR BLD: 0 % (ref 0–1.5)
FHHB: 2.4 % (ref 0–5)
HCO3 BLDA-SCNC: 21.7 MMOL/L (ref 22–26)
HGB BLDA-MCNC: 12.6 G/DL (ref 13.8–16.4)
INHALED O2 CONCENTRATION: 21 %
METHGB BLD QL: 0 % (ref 0–1.5)
MODALITY: ABNORMAL
OXYHGB MFR BLDV: 97.6 % (ref 94–99)
PCO2 BLDA: 34.1 MM HG (ref 35–45)
PH BLDA: 7.42 PH UNITS (ref 7.35–7.45)
PO2 BLD: 498 MM[HG] (ref 0–500)
PO2 BLDA: 104.6 MM HG (ref 80–100)
SAO2 % BLDCOA: 97.6 % (ref 95–99)

## 2022-06-16 PROCEDURE — 82805 BLOOD GASES W/O2 SATURATION: CPT

## 2022-06-16 PROCEDURE — 83050 HGB METHEMOGLOBIN QUAN: CPT

## 2022-06-16 PROCEDURE — 82375 ASSAY CARBOXYHB QUANT: CPT

## 2022-06-16 PROCEDURE — 36600 WITHDRAWAL OF ARTERIAL BLOOD: CPT

## 2022-06-27 ENCOUNTER — ANESTHESIA (OUTPATIENT)
Dept: GASTROENTEROLOGY | Facility: HOSPITAL | Age: 67
End: 2022-06-27

## 2022-06-27 ENCOUNTER — HOSPITAL ENCOUNTER (OUTPATIENT)
Facility: HOSPITAL | Age: 67
Setting detail: HOSPITAL OUTPATIENT SURGERY
Discharge: HOME | End: 2022-06-27
Attending: INTERNAL MEDICINE | Admitting: INTERNAL MEDICINE

## 2022-06-27 ENCOUNTER — ANESTHESIA EVENT (OUTPATIENT)
Dept: GASTROENTEROLOGY | Facility: HOSPITAL | Age: 67
End: 2022-06-27

## 2022-06-27 VITALS
OXYGEN SATURATION: 100 % | TEMPERATURE: 97.8 F | HEART RATE: 70 BPM | WEIGHT: 128.31 LBS | BODY MASS INDEX: 19.51 KG/M2 | SYSTOLIC BLOOD PRESSURE: 105 MMHG | RESPIRATION RATE: 21 BRPM | DIASTOLIC BLOOD PRESSURE: 61 MMHG

## 2022-06-27 DIAGNOSIS — D50.9 IRON DEFICIENCY ANEMIA, UNSPECIFIED IRON DEFICIENCY ANEMIA TYPE: ICD-10-CM

## 2022-06-27 DIAGNOSIS — D64.9 ANEMIA, UNSPECIFIED TYPE: ICD-10-CM

## 2022-06-27 PROCEDURE — 88305 TISSUE EXAM BY PATHOLOGIST: CPT | Performed by: INTERNAL MEDICINE

## 2022-06-27 PROCEDURE — 25010000002 PROPOFOL 10 MG/ML EMULSION: Performed by: NURSE ANESTHETIST, CERTIFIED REGISTERED

## 2022-06-27 PROCEDURE — 45385 COLONOSCOPY W/LESION REMOVAL: CPT | Performed by: INTERNAL MEDICINE

## 2022-06-27 RX ORDER — LIDOCAINE HYDROCHLORIDE 20 MG/ML
INJECTION, SOLUTION EPIDURAL; INFILTRATION; INTRACAUDAL; PERINEURAL AS NEEDED
Status: DISCONTINUED | OUTPATIENT
Start: 2022-06-27 | End: 2022-06-27 | Stop reason: SURG

## 2022-06-27 RX ORDER — PROPOFOL 10 MG/ML
VIAL (ML) INTRAVENOUS AS NEEDED
Status: DISCONTINUED | OUTPATIENT
Start: 2022-06-27 | End: 2022-06-27 | Stop reason: SURG

## 2022-06-27 RX ORDER — SODIUM CHLORIDE, SODIUM LACTATE, POTASSIUM CHLORIDE, CALCIUM CHLORIDE 600; 310; 30; 20 MG/100ML; MG/100ML; MG/100ML; MG/100ML
30 INJECTION, SOLUTION INTRAVENOUS CONTINUOUS
Status: DISCONTINUED | OUTPATIENT
Start: 2022-06-27 | End: 2022-06-27 | Stop reason: HOSPADM

## 2022-06-27 RX ORDER — SODIUM CHLORIDE, SODIUM LACTATE, POTASSIUM CHLORIDE, CALCIUM CHLORIDE 600; 310; 30; 20 MG/100ML; MG/100ML; MG/100ML; MG/100ML
INJECTION, SOLUTION INTRAVENOUS CONTINUOUS PRN
Status: DISCONTINUED | OUTPATIENT
Start: 2022-06-27 | End: 2022-06-27 | Stop reason: SURG

## 2022-06-27 RX ADMIN — PROPOFOL 200 MCG/KG/MIN: 10 INJECTION, EMULSION INTRAVENOUS at 10:27

## 2022-06-27 RX ADMIN — SODIUM CHLORIDE, POTASSIUM CHLORIDE, SODIUM LACTATE AND CALCIUM CHLORIDE: 600; 310; 30; 20 INJECTION, SOLUTION INTRAVENOUS at 10:19

## 2022-06-27 RX ADMIN — SODIUM CHLORIDE, POTASSIUM CHLORIDE, SODIUM LACTATE AND CALCIUM CHLORIDE 30 ML/HR: 600; 310; 30; 20 INJECTION, SOLUTION INTRAVENOUS at 09:32

## 2022-06-27 RX ADMIN — LIDOCAINE HYDROCHLORIDE 50 MG: 20 INJECTION, SOLUTION EPIDURAL; INFILTRATION; INTRACAUDAL; PERINEURAL at 10:27

## 2022-06-27 RX ADMIN — PROPOFOL 90 MG: 10 INJECTION, EMULSION INTRAVENOUS at 10:27

## 2022-06-27 NOTE — ANESTHESIA PREPROCEDURE EVALUATION
Anesthesia Evaluation     Patient summary reviewed and Nursing notes reviewed   no history of anesthetic complications:  NPO Solid Status: > 8 hours  NPO Liquid Status: > 2 hours           Airway   Mallampati: II  TM distance: >3 FB  No difficulty expected  Dental          Pulmonary - normal exam   (+) pneumonia resolved ,     ROS comment: hoarseness  Cardiovascular - normal exam  Exercise tolerance: good (4-7 METS)    ECG reviewed    (+) hypertension,     ROS comment: Stress test:  Interpretation Summary       · Left ventricular ejection fraction is moderately reduced. (Calculated EF = 31%).  · The SPECT scan showed no ischemic or fixed perfusion defects. The gated scan showed moderate global LV hypokinesis..     1.  Abnormal pretest ECG without ischemic changes during the stress test.  2.  Arrhythmias: one ventricular couplets and frequent isolated PVCs and PACs  3.  No evidence of myocardial infarction or reversible ischemia.  4.  Moderate reduction of the LV systolic function.  Cardiac arrhythmia may underestimate the LV systolic function.  Nonischemic dilated cardiomyopathy is considered.  Consider echocardiography to reassess the LV systolic function      Neuro/Psych- negative ROS  GI/Hepatic/Renal/Endo - negative ROS     Musculoskeletal     Abdominal  - normal exam   Substance History - negative use     OB/GYN negative ob/gyn ROS         Other   arthritis, blood dyscrasia anemia,                     Anesthesia Plan    ASA 4     general     (Total IV Anesthesia    Patient understands anesthesia not responsible for dental damage.  )  intravenous induction     Anesthetic plan, risks, benefits, and alternatives have been provided, discussed and informed consent has been obtained with: patient.    Plan discussed with CRNA.        CODE STATUS:

## 2022-06-27 NOTE — ANESTHESIA POSTPROCEDURE EVALUATION
Patient: Hai Gold    Procedure Summary     Date: 06/27/22 Room / Location: [facility] ENDOSCOPY 4 / [facility] ENDOSCOPY    Anesthesia Start: 1023 Anesthesia Stop: 1050    Procedure: COLONOSCOPY WITH POLYPECTOMIES (N/A ) Diagnosis:       Iron deficiency anemia, unspecified iron deficiency anemia type      Anemia, unspecified type      (Iron deficiency anemia, unspecified iron deficiency anemia type [D50.9])      (Anemia, unspecified type [D64.9])    Surgeons: Min Campbell MD Provider: Kandace Moseley DO    Anesthesia Type: general ASA Status: 4          Anesthesia Type: general    Vitals  Vitals Value Taken Time   /61 06/27/22 1104   Temp 36.6 °C (97.8 °F) 06/27/22 1104   Pulse 70 06/27/22 1104   Resp 21 06/27/22 1104   SpO2 100 % 06/27/22 1104           Post Anesthesia Care and Evaluation    Patient location during evaluation: bedside  Patient participation: complete - patient participated  Level of consciousness: awake  Pain management: adequate    Airway patency: patent  Anesthetic complications: No anesthetic complications  PONV Status: none  Cardiovascular status: acceptable and stable  Respiratory status: acceptable  Hydration status: acceptable    Comments: An Anesthesiologist personally participated in the most demanding procedures (including induction and emergence if applicable) in the anesthesia plan, monitored the course of anesthesia administration at frequent intervals and remained physically present and available for immediate diagnosis and treatment of emergencies.

## 2022-06-27 NOTE — H&P
Pre Procedure History & Physical    Chief Complaint:   Iron deficiency anemia    Subjective     HPI:   Iron deficiency anemia    Past Medical History:   Past Medical History:   Diagnosis Date   • Arthritis    • Hypertension        Past Surgical History:  Past Surgical History:   Procedure Laterality Date   • COLONOSCOPY     • ENDOSCOPY N/A 03/29/2022    Procedure: ESOPHAGOGASTRODUODENOSCOPY WITH BIOPSIES;  Surgeon: Min Campbell MD;  Location: [facility] ENDOSCOPY;  Service: Gastroenterology;  Laterality: N/A;  HIATAL HERNIA   • HEMORRHOIDECTOMY         Family History:  History reviewed. No pertinent family history.    Social History:   reports that he has never smoked. He has never used smokeless tobacco. He reports previous alcohol use. He reports that he does not use drugs.    Medications:   Medications Prior to Admission   Medication Sig Dispense Refill Last Dose   • aspirin 81 MG EC tablet Take 81 mg by mouth Daily.   6/26/2022 at Unknown time   • Etanercept (Enbrel Mini) 50 MG/ML solution cartridge Inject 50 mg under the skin into the appropriate area as directed Every 7 (Seven) Days. mon   Past Week at Unknown time   • folic acid (FOLVITE) 1 MG tablet Take 1 mg by mouth Daily.   6/26/2022 at Unknown time   • linaclotide (LINZESS) 145 MCG capsule capsule Take 145 mcg by mouth Every Morning Before Breakfast.   6/26/2022 at Unknown time   • metoprolol succinate XL (TOPROL-XL) 25 MG 24 hr tablet Take 1 tablet by mouth Daily.   6/27/2022 at Unknown time   • oxyCODONE-acetaminophen (PERCOCET) 7.5-325 MG per tablet Take 1 tablet by mouth 2 (Two) Times a Day As Needed.   6/26/2022 at Unknown time   • Testosterone Enanthate 200 MG/ML solution Inject 200 mg into the appropriate muscle as directed by prescriber Every 7 (Seven) Days. wed   Past Week at Unknown time   • famotidine (PEPCID) 20 MG tablet Every 12 (Twelve) Hours.   Unknown at Unknown time   • methotrexate 2.5 MG tablet Take  by mouth 1 (One) Time Per  Week.          Allergies:  Penicillins        Objective     Blood pressure 133/98, temperature 97.4 °F (36.3 °C), temperature source Temporal, resp. rate 18, weight 58.2 kg (128 lb 4.9 oz), SpO2 96 %.    Physical Exam   Constitutional: Pt is oriented to person, place, and time and well-developed, well-nourished, and in no distress.   Mouth/Throat: Oropharynx is clear and moist.   Neck: Normal range of motion.   Cardiovascular: Normal rate, regular rhythm and normal heart sounds.    Pulmonary/Chest: Effort normal and breath sounds normal.   Abdominal: Soft. Nontender  Skin: Skin is warm and dry.   Psychiatric: Mood, memory, affect and judgment normal.     Assessment & Plan     Diagnosis:  Iron deficiency anemia    Anticipated Surgical Procedure:  Colonoscopy    The risks, benefits, and alternatives of this procedure have been discussed with the patient or the responsible party- the patient understands and agrees to proceed.

## 2022-06-28 LAB
CYTO UR: NORMAL
LAB AP CASE REPORT: NORMAL
LAB AP CLINICAL INFORMATION: NORMAL
PATH REPORT.FINAL DX SPEC: NORMAL
PATH REPORT.GROSS SPEC: NORMAL

## 2022-08-11 ENCOUNTER — HOSPITAL ENCOUNTER (INPATIENT)
Facility: HOSPITAL | Age: 67
LOS: 3 days | Discharge: HOME | End: 2022-08-14
Attending: EMERGENCY MEDICINE | Admitting: INTERNAL MEDICINE

## 2022-08-11 ENCOUNTER — APPOINTMENT (OUTPATIENT)
Dept: GENERAL RADIOLOGY | Facility: HOSPITAL | Age: 67
End: 2022-08-11

## 2022-08-11 DIAGNOSIS — I50.9 ACUTE ON CHRONIC CONGESTIVE HEART FAILURE, UNSPECIFIED HEART FAILURE TYPE: ICD-10-CM

## 2022-08-11 DIAGNOSIS — R00.0 TACHYCARDIA: Primary | ICD-10-CM

## 2022-08-11 PROBLEM — R06.00 DYSPNEA: Status: ACTIVE | Noted: 2022-08-11

## 2022-08-11 LAB
ALBUMIN SERPL-MCNC: 3.6 G/DL (ref 3.5–5.2)
ALBUMIN/GLOB SERPL: 0.8 G/DL
ALP SERPL-CCNC: 77 U/L (ref 39–117)
ALT SERPL W P-5'-P-CCNC: 7 U/L (ref 1–41)
ANION GAP SERPL CALCULATED.3IONS-SCNC: 7.6 MMOL/L (ref 5–15)
AST SERPL-CCNC: 13 U/L (ref 1–40)
BASOPHILS # BLD AUTO: 0.05 10*3/MM3 (ref 0–0.2)
BASOPHILS NFR BLD AUTO: 0.6 % (ref 0–1.5)
BILIRUB SERPL-MCNC: 0.4 MG/DL (ref 0–1.2)
BUN SERPL-MCNC: 14 MG/DL (ref 8–23)
BUN/CREAT SERPL: 16.1 (ref 7–25)
CALCIUM SPEC-SCNC: 9.1 MG/DL (ref 8.6–10.5)
CHLORIDE SERPL-SCNC: 102 MMOL/L (ref 98–107)
CK MB SERPL-CCNC: 4.25 NG/ML
CK SERPL-CCNC: 96 U/L (ref 20–200)
CO2 SERPL-SCNC: 25.4 MMOL/L (ref 22–29)
CREAT SERPL-MCNC: 0.87 MG/DL (ref 0.76–1.27)
DEPRECATED RDW RBC AUTO: 57.2 FL (ref 37–54)
EGFRCR SERPLBLD CKD-EPI 2021: 94.6 ML/MIN/1.73
EOSINOPHIL # BLD AUTO: 0.05 10*3/MM3 (ref 0–0.4)
EOSINOPHIL NFR BLD AUTO: 0.6 % (ref 0.3–6.2)
ERYTHROCYTE [DISTWIDTH] IN BLOOD BY AUTOMATED COUNT: 17.4 % (ref 12.3–15.4)
GLOBULIN UR ELPH-MCNC: 4.6 GM/DL
GLUCOSE SERPL-MCNC: 140 MG/DL (ref 65–99)
HCT VFR BLD AUTO: 33.2 % (ref 37.5–51)
HGB BLD-MCNC: 11.2 G/DL (ref 13–17.7)
HOLD SPECIMEN: NORMAL
HOLD SPECIMEN: NORMAL
IMM GRANULOCYTES # BLD AUTO: 0.03 10*3/MM3 (ref 0–0.05)
IMM GRANULOCYTES NFR BLD AUTO: 0.4 % (ref 0–0.5)
LIPASE SERPL-CCNC: 9 U/L (ref 13–60)
LYMPHOCYTES # BLD AUTO: 1.58 10*3/MM3 (ref 0.7–3.1)
LYMPHOCYTES NFR BLD AUTO: 20.3 % (ref 19.6–45.3)
MAGNESIUM SERPL-MCNC: 1.8 MG/DL (ref 1.6–2.4)
MCH RBC QN AUTO: 30.2 PG (ref 26.6–33)
MCHC RBC AUTO-ENTMCNC: 33.7 G/DL (ref 31.5–35.7)
MCV RBC AUTO: 89.5 FL (ref 79–97)
MONOCYTES # BLD AUTO: 0.44 10*3/MM3 (ref 0.1–0.9)
MONOCYTES NFR BLD AUTO: 5.7 % (ref 5–12)
NEUTROPHILS NFR BLD AUTO: 5.62 10*3/MM3 (ref 1.7–7)
NEUTROPHILS NFR BLD AUTO: 72.4 % (ref 42.7–76)
NRBC BLD AUTO-RTO: 0 /100 WBC (ref 0–0.2)
NT-PROBNP SERPL-MCNC: 6960 PG/ML (ref 0–900)
PLATELET # BLD AUTO: 337 10*3/MM3 (ref 140–450)
PMV BLD AUTO: 9 FL (ref 6–12)
POTASSIUM SERPL-SCNC: 4.1 MMOL/L (ref 3.5–5.2)
PROT SERPL-MCNC: 8.2 G/DL (ref 6–8.5)
RBC # BLD AUTO: 3.71 10*6/MM3 (ref 4.14–5.8)
SARS-COV-2 RNA PNL SPEC NAA+PROBE: DETECTED
SODIUM SERPL-SCNC: 135 MMOL/L (ref 136–145)
TROPONIN I SERPL-MCNC: 0.03 NG/ML (ref 0–0.08)
TROPONIN I SERPL-MCNC: 0.03 NG/ML (ref 0–0.08)
TROPONIN T SERPL-MCNC: <0.01 NG/ML (ref 0–0.03)
WBC NRBC COR # BLD: 7.77 10*3/MM3 (ref 3.4–10.8)
WHOLE BLOOD HOLD COAG: NORMAL
WHOLE BLOOD HOLD SPECIMEN: NORMAL

## 2022-08-11 PROCEDURE — 84484 ASSAY OF TROPONIN QUANT: CPT | Performed by: INTERNAL MEDICINE

## 2022-08-11 PROCEDURE — 94799 UNLISTED PULMONARY SVC/PX: CPT

## 2022-08-11 PROCEDURE — 93005 ELECTROCARDIOGRAM TRACING: CPT | Performed by: EMERGENCY MEDICINE

## 2022-08-11 PROCEDURE — 85025 COMPLETE CBC W/AUTO DIFF WBC: CPT

## 2022-08-11 PROCEDURE — 25010000002 FUROSEMIDE PER 20 MG: Performed by: EMERGENCY MEDICINE

## 2022-08-11 PROCEDURE — 93010 ELECTROCARDIOGRAM REPORT: CPT | Performed by: INTERNAL MEDICINE

## 2022-08-11 PROCEDURE — 94640 AIRWAY INHALATION TREATMENT: CPT

## 2022-08-11 PROCEDURE — 84484 ASSAY OF TROPONIN QUANT: CPT

## 2022-08-11 PROCEDURE — 25010000002 ENOXAPARIN PER 10 MG: Performed by: INTERNAL MEDICINE

## 2022-08-11 PROCEDURE — 71045 X-RAY EXAM CHEST 1 VIEW: CPT

## 2022-08-11 PROCEDURE — 80053 COMPREHEN METABOLIC PANEL: CPT

## 2022-08-11 PROCEDURE — 83690 ASSAY OF LIPASE: CPT

## 2022-08-11 PROCEDURE — 25010000002 FUROSEMIDE PER 20 MG: Performed by: INTERNAL MEDICINE

## 2022-08-11 PROCEDURE — 83880 ASSAY OF NATRIURETIC PEPTIDE: CPT

## 2022-08-11 PROCEDURE — 99284 EMERGENCY DEPT VISIT MOD MDM: CPT

## 2022-08-11 PROCEDURE — 93005 ELECTROCARDIOGRAM TRACING: CPT

## 2022-08-11 PROCEDURE — 83735 ASSAY OF MAGNESIUM: CPT

## 2022-08-11 PROCEDURE — 82553 CREATINE MB FRACTION: CPT

## 2022-08-11 PROCEDURE — 82550 ASSAY OF CK (CPK): CPT

## 2022-08-11 PROCEDURE — U0004 COV-19 TEST NON-CDC HGH THRU: HCPCS | Performed by: INTERNAL MEDICINE

## 2022-08-11 RX ORDER — FUROSEMIDE 10 MG/ML
80 INJECTION INTRAMUSCULAR; INTRAVENOUS ONCE
Status: COMPLETED | OUTPATIENT
Start: 2022-08-11 | End: 2022-08-11

## 2022-08-11 RX ORDER — SODIUM CHLORIDE 9 MG/ML
40 INJECTION, SOLUTION INTRAVENOUS AS NEEDED
Status: DISCONTINUED | OUTPATIENT
Start: 2022-08-11 | End: 2022-08-14 | Stop reason: HOSPADM

## 2022-08-11 RX ORDER — SODIUM CHLORIDE 0.9 % (FLUSH) 0.9 %
10 SYRINGE (ML) INJECTION EVERY 12 HOURS SCHEDULED
Status: DISCONTINUED | OUTPATIENT
Start: 2022-08-11 | End: 2022-08-14 | Stop reason: HOSPADM

## 2022-08-11 RX ORDER — ASPIRIN 81 MG/1
324 TABLET, CHEWABLE ORAL ONCE
Status: DISCONTINUED | OUTPATIENT
Start: 2022-08-11 | End: 2022-08-11

## 2022-08-11 RX ORDER — OXYCODONE AND ACETAMINOPHEN 7.5; 325 MG/1; MG/1
1 TABLET ORAL 2 TIMES DAILY PRN
Status: DISCONTINUED | OUTPATIENT
Start: 2022-08-11 | End: 2022-08-13

## 2022-08-11 RX ORDER — ASPIRIN 81 MG/1
81 TABLET ORAL DAILY
Status: DISCONTINUED | OUTPATIENT
Start: 2022-08-11 | End: 2022-08-14 | Stop reason: HOSPADM

## 2022-08-11 RX ORDER — FOLIC ACID 1 MG/1
1 TABLET ORAL DAILY
Status: DISCONTINUED | OUTPATIENT
Start: 2022-08-11 | End: 2022-08-14 | Stop reason: HOSPADM

## 2022-08-11 RX ORDER — ENOXAPARIN SODIUM 100 MG/ML
40 INJECTION SUBCUTANEOUS DAILY
Status: DISCONTINUED | OUTPATIENT
Start: 2022-08-11 | End: 2022-08-14 | Stop reason: HOSPADM

## 2022-08-11 RX ORDER — FUROSEMIDE 10 MG/ML
40 INJECTION INTRAMUSCULAR; INTRAVENOUS
Status: DISCONTINUED | OUTPATIENT
Start: 2022-08-11 | End: 2022-08-13

## 2022-08-11 RX ORDER — SODIUM CHLORIDE 0.9 % (FLUSH) 0.9 %
10 SYRINGE (ML) INJECTION AS NEEDED
Status: DISCONTINUED | OUTPATIENT
Start: 2022-08-11 | End: 2022-08-14 | Stop reason: HOSPADM

## 2022-08-11 RX ORDER — IPRATROPIUM BROMIDE AND ALBUTEROL SULFATE 2.5; .5 MG/3ML; MG/3ML
3 SOLUTION RESPIRATORY (INHALATION) ONCE
Status: COMPLETED | OUTPATIENT
Start: 2022-08-11 | End: 2022-08-11

## 2022-08-11 RX ORDER — METOPROLOL SUCCINATE 25 MG/1
25 TABLET, EXTENDED RELEASE ORAL DAILY
Status: DISCONTINUED | OUTPATIENT
Start: 2022-08-11 | End: 2022-08-14 | Stop reason: HOSPADM

## 2022-08-11 RX ADMIN — ASPIRIN 81 MG: 81 TABLET, COATED ORAL at 11:03

## 2022-08-11 RX ADMIN — FOLIC ACID 1 MG: 1 TABLET ORAL at 11:03

## 2022-08-11 RX ADMIN — METOPROLOL TARTRATE 5 MG: 5 INJECTION INTRAVENOUS at 06:45

## 2022-08-11 RX ADMIN — FUROSEMIDE 40 MG: 10 INJECTION, SOLUTION INTRAMUSCULAR; INTRAVENOUS at 11:03

## 2022-08-11 RX ADMIN — FUROSEMIDE 80 MG: 10 INJECTION, SOLUTION INTRAMUSCULAR; INTRAVENOUS at 06:44

## 2022-08-11 RX ADMIN — ENOXAPARIN SODIUM 40 MG: 40 INJECTION SUBCUTANEOUS at 11:03

## 2022-08-11 RX ADMIN — OXYCODONE HYDROCHLORIDE AND ACETAMINOPHEN 1 TABLET: 7.5; 325 TABLET ORAL at 20:05

## 2022-08-11 RX ADMIN — Medication 10 ML: at 11:04

## 2022-08-11 RX ADMIN — METOPROLOL SUCCINATE 25 MG: 25 TABLET, EXTENDED RELEASE ORAL at 11:03

## 2022-08-11 RX ADMIN — Medication 10 ML: at 20:06

## 2022-08-11 RX ADMIN — FUROSEMIDE 40 MG: 10 INJECTION, SOLUTION INTRAMUSCULAR; INTRAVENOUS at 18:19

## 2022-08-11 RX ADMIN — IPRATROPIUM BROMIDE AND ALBUTEROL SULFATE 3 ML: 2.5; .5 SOLUTION RESPIRATORY (INHALATION) at 06:12

## 2022-08-11 NOTE — H&P
[facility]   HISTORY AND PHYSICAL    Patient Name: Hai Gold  : 1955  MRN: [id number]  Primary Care Physician:  Shelly Mejia MD  Date of admission: 2022    Subjective   Subjective     Chief Complaint: Dyspnea,CHF    HPI:    Hai Gold is a 67 y.o. male who was admitted with dyspnea and CHF. Sees Dr.El Argueta for his heart problems. His BNP was high in the ER and was admitted. Also has dry cough, swelling legs.Takes several medications for Arthritis Enbrel and Methotrexate    Review of Systems   Review of Systems   Constitutional: Positive for fatigue. Activity change: in bed.   HENT: Negative.    Eyes: Negative.    Respiratory: Positive for cough, chest tightness and shortness of breath.    Cardiovascular: Positive for leg swelling.   Gastrointestinal: Negative.    Endocrine: Negative.    Genitourinary: Negative.    Musculoskeletal: Negative.    Skin: Negative.    Allergic/Immunologic: Negative.    Neurological: Positive for weakness.   Hematological: Negative.    Psychiatric/Behavioral: Negative.        Personal History     Past Medical History:   Diagnosis Date   • Arthritis    • CHF (congestive heart failure) (HCC)    • Hypertension        Past Surgical History:   Procedure Laterality Date   • COLONOSCOPY     • COLONOSCOPY N/A 2022    Procedure: COLONOSCOPY WITH POLYPECTOMIES;  Surgeon: Min Campbell MD;  Location: [facility] ENDOSCOPY;  Service: Gastroenterology;  Laterality: N/A;  COLON POLYPS, DIVERTICULOSIS   • ENDOSCOPY N/A 2022    Procedure: ESOPHAGOGASTRODUODENOSCOPY WITH BIOPSIES;  Surgeon: Min Campbell MD;  Location: [facility] ENDOSCOPY;  Service: Gastroenterology;  Laterality: N/A;  HIATAL HERNIA   • HEMORRHOIDECTOMY         Family History: family history is not on file. Otherwise pertinent FHx was reviewed and not pertinent to current issue.    Social History:  reports that he has never smoked. He has never used smokeless tobacco. He  reports previous alcohol use. He reports that he does not use drugs.    Home Medications:  Etanercept, Testosterone Enanthate, aspirin, folic acid, linaclotide, methotrexate, metoprolol succinate XL, and oxyCODONE-acetaminophen      Allergies:  Allergies   Allergen Reactions   • Penicillins Hives       Objective   Objective     Vitals:   Temp:  [98 °F (36.7 °C)] 98 °F (36.7 °C)  Heart Rate:  [101-126] 125  Resp:  [20] 20  BP: (110-131)/(87-99) 122/99  Physical Exam    Constitutional: Awake, alert   Eyes: PERRLA, sclerae anicteric, no conjunctival injection   HENT: NCAT, mucous membranes moist   Neck: Supple, no thyromegaly, no lymphadenopathy, trachea midline   Respiratory: Clear to auscultation bilaterally, nonlabored respirations    Cardiovascular: RRR, no murmurs, rubs, or gallops, palpable pedal pulses bilaterally   Gastrointestinal: Positive bowel sounds, soft, nontender, nondistended   Musculoskeletal: No bilateral ankle edema, no clubbing or cyanosis to extremities   Psychiatric: Appropriate affect, cooperative   Neurologic: Oriented x 3, strength symmetric in all extremities, Cranial Nerves grossly intact to confrontation, speech clear   Skin: No rashes     Result Review    Result Review:  I have personally reviewed the results from the time of this admission to 8/11/2022 07:45 EDT and agree with these findings:  [x]  Laboratory  [x]  Microbiology  []  Radiology  []  EKG/Telemetry   []  Cardiology/Vascular   []  Pathology  []  Old records  []  Other:  Most notable findings include: BNP 6960, CXR bilateral infiltrates    Assessment & Plan   Assessment / Plan     Brief Patient Summary:  Hai Gold is a 67 y.o. male who is admitted for CHF on IV Lasix.     Active Hospital Problems:  Active Hospital Problems    Diagnosis    • CHF (congestive heart failure) (HCC)      Plan:   Covid test ordered for cough,infiltrates  Cardiology consult Dr. Shepherd covering for Dr.El Argueta  IV Lasix    DVT  prophylaxis:  No DVT prophylaxis order currently exists.    CODE STATUS:    Level Of Support Discussed With: Patient  Code Status (Patient has no pulse and is not breathing): CPR (Attempt to Resuscitate)  Medical Interventions (Patient has pulse or is breathing): Full Support    Admission Status:  I believe this patient meets in patient status.    Electronically signed by Sunshine Joy MD, 08/11/22, 7:45 AM EDT.

## 2022-08-11 NOTE — PLAN OF CARE
Problem: Chest Pain  Goal: Resolution of Chest Pain Symptoms  Outcome: Ongoing, Progressing   Goal Outcome Evaluation:  Plan of Care Reviewed With: patient        Progress: no change  Outcome Evaluation: Patient doing well, has not had any chest pain since arriving to the unit. Has complained of some SOA, administered 2L, pt tolerating well. Will contininue to monitor, call light in reach.

## 2022-08-11 NOTE — CONSULTS
Cardiology Consult Note  [facility] CARE UNIT          Patient Identification:  Hai Gold      [id number]  67 y.o.        male  1955         Reason for Consultation: Shortness of breath    PCP: Shelly Mejia MD    History of Present Illness:     Patient is a 67-year-old male who came to the emergency room for shortness of breath.  He has a history of congestive heart failure.  For a few days.  Patient started having wheezing shortness of breath came to the emergency room was found to have congestive heart failure.  His BNP was high at 6960.  No chest pain or shortness of breath.  Had a stress nuclear scan in March which showed ejection fraction of 31% and no evidence of reversible ischemia    Past History:  Past Medical History:   Diagnosis Date   • Arthritis    • CHF (congestive heart failure) (HCC)    • Hypertension      Past Surgical History:   Procedure Laterality Date   • COLONOSCOPY     • COLONOSCOPY N/A 6/27/2022    Procedure: COLONOSCOPY WITH POLYPECTOMIES;  Surgeon: Min Campbell MD;  Location: [facility] ENDOSCOPY;  Service: Gastroenterology;  Laterality: N/A;  COLON POLYPS, DIVERTICULOSIS   • ENDOSCOPY N/A 03/29/2022    Procedure: ESOPHAGOGASTRODUODENOSCOPY WITH BIOPSIES;  Surgeon: Min Campbell MD;  Location: [facility] ENDOSCOPY;  Service: Gastroenterology;  Laterality: N/A;  HIATAL HERNIA   • HEMORRHOIDECTOMY       Allergies   Allergen Reactions   • Penicillins Hives     Social History     Socioeconomic History   • Marital status:    Tobacco Use   • Smoking status: Never Smoker   • Smokeless tobacco: Never Used   Substance and Sexual Activity   • Alcohol use: Not Currently   • Drug use: Never   • Sexual activity: Defer     History reviewed. No pertinent family history.      Medications:  Prior to Admission medications    Medication Sig Start Date End Date Taking? Authorizing Provider   aspirin 81 MG EC tablet Take 81 mg by mouth Daily.   Yes  Kat Aguilar MD   Etanercept (Enbrel Mini) 50 MG/ML solution cartridge Inject 50 mg under the skin into the appropriate area as directed Every 7 (Seven) Days. Mon   Yes Kat Aguilar MD   folic acid (FOLVITE) 1 MG tablet Take 1 mg by mouth Daily. 12/20/21  Yes Kat Aguilar MD   linaclotide (LINZESS) 145 MCG capsule capsule Take 145 mcg by mouth Every Morning Before Breakfast.   Yes Kat Aguilar MD   methotrexate 2.5 MG tablet Take 12.5 mg by mouth 1 (One) Time Per Week. Friday (5 tabs)   Yes Kat Aguilar MD   metoprolol succinate XL (TOPROL-XL) 25 MG 24 hr tablet Take 25 mg by mouth Daily.   Yes Kat Aguilar MD   oxyCODONE-acetaminophen (PERCOCET) 7.5-325 MG per tablet Take 1 tablet by mouth 2 (Two) Times a Day As Needed. 2/14/22   Kat Aguilar MD   Testosterone Enanthate 200 MG/ML solution Inject 200 mg into the appropriate muscle as directed by prescriber Every 14 (Fourteen) Days. Wed    Kat Aguilar MD   famotidine (PEPCID) 20 MG tablet Every 12 (Twelve) Hours.  8/11/22  Kat Aguilar MD      Current medications:  aspirin, 81 mg, Oral, Daily  enoxaparin, 40 mg, Subcutaneous, Daily  folic acid, 1 mg, Oral, Daily  furosemide, 40 mg, Intravenous, BID  [START ON 8/15/2022] etanercept, 50 mg, Subcutaneous, Weekly  linaclotide, 145 mcg, Oral, QAM  [START ON 8/17/2022] methotrexate, 12.5 mg, Oral, Weekly  metoprolol succinate XL, 25 mg, Oral, Daily  sodium chloride, 10 mL, Intravenous, Q12H      Current IV drips:         Review of Systems  Review of Systems   Constitutional: Negative for appetite change, fatigue and fever.   HENT: Negative for congestion.    Respiratory: Positive for shortness of breath. Negative for apnea, choking, chest tightness and wheezing.    Cardiovascular: Negative for chest pain, palpitations and leg swelling.   Gastrointestinal: Negative for diarrhea, nausea and vomiting.   Genitourinary: Negative for dysuria,  "frequency and hematuria.   Musculoskeletal: Positive for arthralgias. Negative for myalgias.   Skin: Negative for pallor.   Neurological: Negative for dizziness, tremors, syncope and weakness.   Psychiatric/Behavioral: Negative for agitation and confusion.         Physical Exam    /73 (BP Location: Left arm, Patient Position: Lying)   Pulse 95   Temp 99.5 °F (37.5 °C) (Oral)   Resp 16   Ht 170.2 cm (67\")   Wt 62.6 kg (138 lb 0.1 oz)   SpO2 100%   BMI 21.62 kg/m²  Body mass index is 21.62 kg/m².   Oxygen saturation   @FLOWAN(10::1)@ SpO2  Min: 92 %  Max: 100 %    General Appearance:   · no acute distress  · Alert and oriented x3  HENT:   · lips not cyanotic  · Atraumatic  Neck:  · thyroid not enlarged  · supple  Respiratory:  · no respiratory distress  · normal breath sounds  · no rales  Cardiovascular:  · no jugular venous distention  · regular rhythm  · apical impulse normal  · S1 normal, S2 normal  · no S3, no S4   · no murmur  · no rub, no thrill  · no carotid bruit  · pedal pulses normal  · lower extremity edema: none    Gastrointestinal:   · bowel sounds normal  · non-tender  · no hepatomegaly, no splenomegaly  Musculoskeletal:  · no clubbing of fingers.   · normocephalic, head atraumatic  Skin:   · warm, dry  · no rashes  Neuro/Psychiatric:  · normal mood and affect  · judgement and insight appropriate      Cardiographics:   Results for orders placed during the hospital encounter of 03/26/22    Adult Transthoracic Echo Complete With Contrast if Necessary Per Protocol    Interpretation Summary  · Aneurysmal dilation of the aortic root is present.  · Left ventricular systolic function is low normal.  · Left ventricular diastolic function was indeterminate.    There were no apparent intracardiac masses, vegetations or thrombi.     Results for orders placed during the hospital encounter of 03/26/22    Stress Test With Myocardial Perfusion One Day    Interpretation Summary  · Left ventricular ejection " fraction is moderately reduced. (Calculated EF = 31%).  · The SPECT scan showed no ischemic or fixed perfusion defects. The gated scan showed moderate global LV hypokinesis..    1.  Abnormal pretest ECG without ischemic changes during the stress test.  2.  Arrhythmias: one ventricular couplets and frequent isolated PVCs and PACs  3.  No evidence of myocardial infarction or reversible ischemia.  4.  Moderate reduction of the LV systolic function.  Cardiac arrhythmia may underestimate the LV systolic function.  Nonischemic dilated cardiomyopathy is considered.  Consider echocardiography to reassess the LV systolic function.      No results found for this or any previous visit.      Cardiolite (Tc-99m Sestamibi) stress test   Lab Review:       CBC    CBC 4/26/22 5/30/22 8/11/22   WBC 10.41 5.41 7.77   RBC 3.85 (A) 4.33 3.71 (A)   Hemoglobin 11.4 (A) 12.6 (A) 11.2 (A)   Hematocrit 35.1 (A) 38.8 33.2 (A)   MCV 91.2 89.6 89.5   MCH 29.6 29.1 30.2   MCHC 32.5 32.5 33.7   RDW 14.5 14.1 17.4 (A)   Platelets 467 (A) 391 337   (A) Abnormal value                CMP    CMP 4/26/22 4/26/22 4/26/22 4/26/22 4/26/22 5/30/22 8/11/22    0919 0919 0919 0919 0919     Glucose      92 140 (A)   BUN 8     11 14   Creatinine  0.74 (A)    0.94 0.87   Sodium      132 (A) 135 (A)   Potassium      4.7 4.1   Chloride      96 (A) 102   Calcium      9.5 9.1   Albumin   3.10 (A)   3.70 3.60   Total Bilirubin      0.2 0.4   Alkaline Phosphatase      79 77   AST (SGOT)     15 13 13   ALT (SGPT)    <5  7 7   (A) Abnormal value               CARDIAC LABS:      Lab 08/11/22  0441   PROBNP 6,960.0*   TROPONIN T <0.010      No results found for: DIGOXIN   Lab Results   Component Value Date    TSH 2.120 04/11/2022           Invalid input(s): LDLCALC  Lab Results   Component Value Date    POCTROP 0.03 08/11/2022     No components found for: DDIMERQUAN  Lab Results   Component Value Date    MG 1.8 08/11/2022                 Assessment:  Congestive heart  failure acute on chronic systolic  Hypertension      Plan:  Agree with continuing IV Lasix and metoprolol      Thank you for allowing me to share in [facility].        Judith Lima MD   8/11/2022    17:31 EDT

## 2022-08-11 NOTE — ED PROVIDER NOTES
Time: 6:23 AM EDT  Arrived by: private car  Chief Complaint: Shortness of breath  History provided by: Patient  History is limited by: N/A     History of Present Illness:  Patient is a 67 y.o. year old male who presents to the emergency department with SOB.  Pt has h/o CHF. Pt reports onset of increasing SOB and mild intermittent chest discomfort, described as 'squeezing', around 2 days ago, states symptoms worsen in decubitus and improve on movement. Pt reports swelling to BLE. Pt also admits to mild dry cough recently. Pt denies nausea, emesis, fever, chills, diaphoresis and lightheadedness.. Pt denies nasal congestion, rhinorrhea, sore throat, cough.. His Cardiologist is Dr. Mills.      History provided by:  Patient   used: No        Similar Symptoms Previously: Yes  Recently seen: No      Patient Care Team  Primary Care Provider: Shelly Mejia MD    Past Medical History:     Allergies   Allergen Reactions   • Penicillins Hives     Past Medical History:   Diagnosis Date   • Arthritis    • CHF (congestive heart failure) (HCC)    • Hypertension      Past Surgical History:   Procedure Laterality Date   • COLONOSCOPY     • COLONOSCOPY N/A 6/27/2022    Procedure: COLONOSCOPY WITH POLYPECTOMIES;  Surgeon: Min Campbell MD;  Location: [facility] ENDOSCOPY;  Service: Gastroenterology;  Laterality: N/A;  COLON POLYPS, DIVERTICULOSIS   • ENDOSCOPY N/A 03/29/2022    Procedure: ESOPHAGOGASTRODUODENOSCOPY WITH BIOPSIES;  Surgeon: Min Campbell MD;  Location: [facility] ENDOSCOPY;  Service: Gastroenterology;  Laterality: N/A;  HIATAL HERNIA   • HEMORRHOIDECTOMY       History reviewed. No pertinent family history.    Home Medications:  Prior to Admission medications    Medication Sig Start Date End Date Taking? Authorizing Provider   aspirin 81 MG EC tablet Take 81 mg by mouth Daily.    Provider, MD Kat   Etanercept (Enbrel Mini) 50 MG/ML solution cartridge Inject 50 mg under the skin  into the appropriate area as directed Every 7 (Seven) Days. mon    Kat Aguilar MD   famotidine (PEPCID) 20 MG tablet Every 12 (Twelve) Hours.    Kat Aguilar MD   folic acid (FOLVITE) 1 MG tablet Take 1 mg by mouth Daily. 12/20/21   Kat Aguilar MD   linaclotide (LINZESS) 145 MCG capsule capsule Take 145 mcg by mouth Every Morning Before Breakfast.    Kat Aguilar MD   methotrexate 2.5 MG tablet Take  by mouth 1 (One) Time Per Week.    Kat Aguilar MD   metoprolol succinate XL (TOPROL-XL) 25 MG 24 hr tablet Take 1 tablet by mouth Daily.    Kat Aguilar MD   oxyCODONE-acetaminophen (PERCOCET) 7.5-325 MG per tablet Take 1 tablet by mouth 2 (Two) Times a Day As Needed. 2/14/22   Kat Aguilar MD   Testosterone Enanthate 200 MG/ML solution Inject 200 mg into the appropriate muscle as directed by prescriber Every 7 (Seven) Days. wed    Kat Aguilar MD        Social History:   Social History     Tobacco Use   • Smoking status: Never Smoker   • Smokeless tobacco: Never Used   Substance Use Topics   • Alcohol use: Not Currently   • Drug use: Never         Review of Systems:  Review of Systems   Constitutional: Negative for activity change, chills, diaphoresis, fatigue, fever and unexpected weight change.   HENT: Negative for congestion, rhinorrhea, sinus pressure, sore throat and trouble swallowing.    Eyes: Negative for pain, discharge, redness and visual disturbance.   Respiratory: Positive for cough (dry) and shortness of breath. Negative for chest tightness and wheezing.    Cardiovascular: Positive for chest pain (Intermittent chest discomfort). Negative for palpitations.   Gastrointestinal: Negative for abdominal pain, diarrhea, nausea and vomiting.   Endocrine: Negative for cold intolerance and polydipsia.   Genitourinary: Negative for dysuria, frequency, hematuria and urgency.   Musculoskeletal: Negative for arthralgias, joint swelling, neck pain  "and neck stiffness.   Skin: Negative for color change and rash.   Allergic/Immunologic: Negative for environmental allergies and immunocompromised state.   Neurological: Negative for dizziness, weakness and light-headedness.   Hematological: Does not bruise/bleed easily.   Psychiatric/Behavioral: Negative for agitation, confusion, dysphoric mood and suicidal ideas.        Physical Exam:  /88 (BP Location: Left arm, Patient Position: Lying)   Pulse 96   Temp 99 °F (37.2 °C) (Oral)   Resp 16   Ht 170.2 cm (67\")   Wt 62.6 kg (138 lb 0.1 oz)   SpO2 100%   BMI 21.62 kg/m²     Physical Exam  Vitals and nursing note reviewed.   Constitutional:       General: He is not in acute distress.     Appearance: Normal appearance. He is not toxic-appearing.   HENT:      Head: Normocephalic and atraumatic.      Jaw: There is normal jaw occlusion.   Eyes:      General: Lids are normal.      Extraocular Movements: Extraocular movements intact.      Conjunctiva/sclera: Conjunctivae normal.      Pupils: Pupils are equal, round, and reactive to light.   Cardiovascular:      Rate and Rhythm: Regular rhythm. Tachycardia present.      Pulses: Normal pulses.      Heart sounds: Normal heart sounds.   Pulmonary:      Effort: Pulmonary effort is normal. No respiratory distress.      Breath sounds: Rales (Bilaterally) present. No wheezing or rhonchi.   Abdominal:      General: Abdomen is flat.      Palpations: Abdomen is soft.      Tenderness: There is no abdominal tenderness. There is no guarding or rebound.   Musculoskeletal:         General: Normal range of motion.      Cervical back: Normal range of motion and neck supple.      Right lower le+ Pitting Edema present.      Left lower le+ Pitting Edema present.   Skin:     General: Skin is warm and dry.   Neurological:      Mental Status: He is alert and oriented to person, place, and time. Mental status is at baseline.   Psychiatric:         Mood and Affect: Mood normal. "                Medications in the Emergency Department:  Medications   sodium chloride 0.9 % flush 10 mL (has no administration in time range)   sodium chloride 0.9 % flush 10 mL (has no administration in time range)   sodium chloride 0.9 % flush 10 mL (10 mL Intravenous Given 8/11/22 1104)   sodium chloride 0.9 % infusion 40 mL (has no administration in time range)   Enoxaparin Sodium (LOVENOX) syringe 40 mg (40 mg Subcutaneous Given 8/11/22 1103)   furosemide (LASIX) injection 40 mg (40 mg Intravenous Given 8/11/22 1103)   aspirin EC tablet 81 mg (81 mg Oral Given 8/11/22 1103)   HOME MED - etanercept (ENBREL) 50 MG/ML injection 50 mg (has no administration in time range)   folic acid (FOLVITE) tablet 1 mg (1 mg Oral Given 8/11/22 1103)   linaclotide (LINZESS) capsule 145 mcg (145 mcg Oral Not Given 8/11/22 1113)   methotrexate tablet 12.5 mg (has no administration in time range)   metoprolol succinate XL (TOPROL-XL) 24 hr tablet 25 mg (25 mg Oral Given 8/11/22 1103)   oxyCODONE-acetaminophen (PERCOCET) 7.5-325 MG per tablet 1 tablet (has no administration in time range)   ipratropium-albuterol (DUO-NEB) nebulizer solution 3 mL (3 mL Nebulization Given 8/11/22 0612)   furosemide (LASIX) injection 80 mg (80 mg Intravenous Given 8/11/22 0644)   metoprolol tartrate (LOPRESSOR) injection 5 mg (5 mg Intravenous Given 8/11/22 0645)        Labs  Lab Results (last 24 hours)     Procedure Component Value Units Date/Time    POC Troponin I with Hold Tube [[id number]] Updated: 08/11/22 0842    Specimen: Blood     Narrative:      The following orders were created for panel order POC Troponin I with Hold Tube.  Procedure                               Abnormality         Status                     ---------                               -----------         ------                     POC Troponin I[[id number]]                                                              HOLD Troponin-I Tube[[id number]]                                                           Please view results for these tests on the individual orders.    CBC & Differential [[id number]]  (Abnormal) Collected: 08/11/22 0441    Specimen: Blood Updated: 08/11/22 0455    Narrative:      The following orders were created for panel order CBC & Differential.  Procedure                               Abnormality         Status                     ---------                               -----------         ------                     CBC Auto Differential[[id number]]        Abnormal            Final result                 Please view results for these tests on the individual orders.    Comprehensive Metabolic Panel [[id number]]  (Abnormal) Collected: 08/11/22 0441    Specimen: Blood Updated: 08/11/22 0516     Glucose 140 mg/dL      BUN 14 mg/dL      Creatinine 0.87 mg/dL      Sodium 135 mmol/L      Potassium 4.1 mmol/L      Chloride 102 mmol/L      CO2 25.4 mmol/L      Calcium 9.1 mg/dL      Total Protein 8.2 g/dL      Albumin 3.60 g/dL      ALT (SGPT) 7 U/L      AST (SGOT) 13 U/L      Alkaline Phosphatase 77 U/L      Total Bilirubin 0.4 mg/dL      Globulin 4.6 gm/dL      A/G Ratio 0.8 g/dL      BUN/Creatinine Ratio 16.1     Anion Gap 7.6 mmol/L      eGFR 94.6 mL/min/1.73      Comment: National Kidney Foundation and American Society of Nephrology (ASN) Task Force recommended calculation based on the Chronic Kidney Disease Epidemiology Collaboration (CKD-EPI) equation refit without adjustment for race.       Narrative:      GFR Normal >60  Chronic Kidney Disease <60  Kidney Failure <15      Lipase [[id number]]  (Abnormal) Collected: 08/11/22 0441    Specimen: Blood Updated: 08/11/22 0516     Lipase 9 U/L     BNP [[id number]]  (Abnormal) Collected: 08/11/22 0441    Specimen: Blood Updated: 08/11/22 0514     proBNP 6,960.0 pg/mL     Narrative:      Among patients with dyspnea, NT-proBNP is highly sensitive for the detection of acute congestive heart failure. In addition NT-proBNP of <300 pg/ml  effectively rules out acute congestive heart failure with 99% negative predictive value.    Results may be falsely decreased if patient taking Biotin.      Magnesium [[id number]]  (Normal) Collected: 08/11/22 0441    Specimen: Blood Updated: 08/11/22 0516     Magnesium 1.8 mg/dL     CK Total & CKMB [[id number]]  (Normal) Collected: 08/11/22 0441    Specimen: Blood Updated: 08/11/22 0516     CKMB 4.25 ng/mL      Creatine Kinase 96 U/L     Narrative:      CKMB results may be falsely decreased if patient taking Biotin.    CBC Auto Differential [[id number]]  (Abnormal) Collected: 08/11/22 0441    Specimen: Blood Updated: 08/11/22 0455     WBC 7.77 10*3/mm3      RBC 3.71 10*6/mm3      Hemoglobin 11.2 g/dL      Hematocrit 33.2 %      MCV 89.5 fL      MCH 30.2 pg      MCHC 33.7 g/dL      RDW 17.4 %      RDW-SD 57.2 fl      MPV 9.0 fL      Platelets 337 10*3/mm3      Neutrophil % 72.4 %      Lymphocyte % 20.3 %      Monocyte % 5.7 %      Eosinophil % 0.6 %      Basophil % 0.6 %      Immature Grans % 0.4 %      Neutrophils, Absolute 5.62 10*3/mm3      Lymphocytes, Absolute 1.58 10*3/mm3      Monocytes, Absolute 0.44 10*3/mm3      Eosinophils, Absolute 0.05 10*3/mm3      Basophils, Absolute 0.05 10*3/mm3      Immature Grans, Absolute 0.03 10*3/mm3      nRBC 0.0 /100 WBC     Troponin [[id number]]  (Normal) Collected: 08/11/22 0441    Specimen: Blood Updated: 08/11/22 1003     Troponin T <0.010 ng/mL     Narrative:      Troponin T Reference Range:  <= 0.03 ng/mL-   Negative for AMI  >0.03 ng/mL-     Abnormal for myocardial necrosis.  Clinicians would have to utilize clinical acumen, EKG, Troponin and serial changes to determine if it is an Acute Myocardial Infarction or myocardial injury due to an underlying chronic condition.       Results may be falsely decreased if patient taking Biotin.      POC Troponin I [[id number]]  (Normal) Collected: 08/11/22 0442    Specimen: Blood Updated: 08/11/22 0454     Troponin I 0.03 ng/mL       Comment: Serial Number: [id number]  [id number]       POC Troponin I [[id number]]  (Normal) Collected: 08/11/22 0641    Specimen: Blood Updated: 08/11/22 0654     Troponin I 0.03 ng/mL      Comment: Serial Number: [id number]  [id number]       COVID PRE-OP / PRE-PROCEDURE SCREENING ORDER (NO ISOLATION) - Swab, Nasopharynx [[id number]] Collected: 08/11/22 1330    Specimen: Swab from Nasopharynx Updated: 08/11/22 1340    Narrative:      The following orders were created for panel order COVID PRE-OP / PRE-PROCEDURE SCREENING ORDER (NO ISOLATION) - Swab, Nasopharynx.  Procedure                               Abnormality         Status                     ---------                               -----------         ------                     COVID-19,APTIMA PANTHER(...[[id number]]                      In process                   Please view results for these tests on the individual orders.    COVID-19,APTIMA PANTHER(ADELINA),[facility]/ [facility], NP/OP SWAB IN UTM/VTM/SALINE TRANSPORT MEDIA,24 HR TAT - Swab, Nasopharynx [[id number]] Collected: 08/11/22 1330    Specimen: Swab from Nasopharynx Updated: 08/11/22 1340           Imaging:  XR Chest 1 View    Result Date: 8/11/2022  PROCEDURE: XR CHEST 1 VW  COMPARISON: 5/30/2022.  INDICATIONS: CHEST PAIN.  FINDINGS: A single AP upright portable view of the chest is provided for review.  Bilateral infiltrates are seen.  The findings may represent infectious multifocal pneumonia.  Mild to moderate cardiomegaly is suggested.  The thoracic aorta is atherosclerotic.  No pneumothorax.  No pneumomediastinum.  There may be a small right pleural effusion.  Minimal, if any, left pleural effusion is suggested.  There is slight pulmonary hypoinflation.  External artifacts obscure detail.       Increased bilateral infiltrates are seen.  The findings may represent infectious multifocal pneumonia.  Pulmonary edema is possible.  There may be a small right pleural effusion.      COMMENT:  Part of this note is  an electronic transcription of spoken language to printed text. The electronic translation/transcription may permit erroneous, or at times, nonsensical (or even sensical) words or phrases to be inadvertently transcribed or omitted; this  has reviewed the note for such errors (as well as additional errors); however, some may still exist.  JOAN MEEK JR, MD       Electronically Signed and Approved By: JOAN MEEK JR, MD on 8/11/2022 at 5:36                Procedures:  ECG 12 Lead      Date/Time: 8/11/2022 6:23 AM  Performed by: Jonah Espinoza DO  Authorized by: Jonah Espinoza DO   Interpreted by physician  Comparison: compared with previous ECG   Similar to previous ECG  Comparison to previous ECG: No significant changes other than tachycardia.  Comments:   Sinus tachycardia 119 BPM  Normal P wave and NE interval  Left Ventricle Hypertrophy and Left axis deviation  Non-Specific ST changes  Normal QT and QTc          Progress                            Medical Decision Making:  MDM  Number of Diagnoses or Management Options     Amount and/or Complexity of Data Reviewed  Clinical lab tests: reviewed  Tests in the radiology section of CPT®: reviewed  Tests in the medicine section of CPT®: reviewed  Decide to obtain previous medical records or to obtain history from someone other than the patient: yes  Discuss the patient with other providers: yes (06:39 EDT - Consult with Dr. Joy, Hospitalist - Discussed patient's case, history, and current condition. Dr. Joy agrees with admission of patient.    06:41 EDT - Consult with Dr. Mills, Cardiology - Discussed patient's case, history, and current condition. Dr. Mills was made aware of patient's admission.)    Patient Progress  Patient progress: (08:40 EDT Pt states symptoms persist with no changes. Updated patient on results and plan for admission. Patient expressed understanding and agreement. All questions answered at this time. )        Final diagnoses:   Tachycardia   Acute on chronic congestive heart failure, unspecified heart failure type ([facility])        Disposition:  ED Disposition     ED Disposition   Decision to Admit    Condition   --    Comment   Level of Care: Telemetry [5]   Diagnosis: CHF (congestive heart failure) ([facility]) [[id number]]   Admitting Physician: WANDY SHI [[id number]]   Attending Physician: WANDY SHI [[id number]]   Isolate for COVID?: No [0]   Certification: I Certify That Inpatient Hospital Services Are Medically Necessary For Greater Than 2 Midnights               This medical record created using voice recognition software and a virtual scribe.    Documentation assistance provided by Yasmin Rosales acting as scribe for Jonah Espinoza DO. Information recorded by the scribe was done at my direction and has been verified and validated by me.          Yasmin Rosales  08/11/22 0708       Yasmin Rosales  08/11/22 0841       Jonah Espinoza DO  08/11/22 1526

## 2022-08-11 NOTE — NURSING NOTE
Cardiac Rehab Phase I - Occurrence #1    Education Topics:  Cardiac Rehab    No Phase I need assessed      Topic Components:  Exercise      Teaching Aids:  Phase 2 Brochure      Teaching Method:  Written Instruction      Teaching Recipient:  Patient        Recovery/Discharge Instructions:  Cardiac Rehab Phase 2      Cardiac Rehab Phase I Comm   Pt does not qualify for phase 2 cardiac rehab at this time.

## 2022-08-12 PROCEDURE — 25010000002 FUROSEMIDE PER 20 MG: Performed by: INTERNAL MEDICINE

## 2022-08-12 PROCEDURE — 94760 N-INVAS EAR/PLS OXIMETRY 1: CPT

## 2022-08-12 PROCEDURE — 94799 UNLISTED PULMONARY SVC/PX: CPT

## 2022-08-12 PROCEDURE — 25010000002 ENOXAPARIN PER 10 MG: Performed by: INTERNAL MEDICINE

## 2022-08-12 RX ORDER — ACETAMINOPHEN 325 MG/1
650 TABLET ORAL EVERY 6 HOURS PRN
Status: DISCONTINUED | OUTPATIENT
Start: 2022-08-12 | End: 2022-08-14 | Stop reason: HOSPADM

## 2022-08-12 RX ORDER — POLYETHYLENE GLYCOL 3350 17 G/17G
17 POWDER, FOR SOLUTION ORAL DAILY PRN
Status: DISCONTINUED | OUTPATIENT
Start: 2022-08-12 | End: 2022-08-14 | Stop reason: HOSPADM

## 2022-08-12 RX ADMIN — METOPROLOL SUCCINATE 25 MG: 25 TABLET, EXTENDED RELEASE ORAL at 08:58

## 2022-08-12 RX ADMIN — LINACLOTIDE 145 MCG: 145 CAPSULE, GELATIN COATED ORAL at 06:05

## 2022-08-12 RX ADMIN — Medication 10 ML: at 09:04

## 2022-08-12 RX ADMIN — ENOXAPARIN SODIUM 40 MG: 40 INJECTION SUBCUTANEOUS at 08:58

## 2022-08-12 RX ADMIN — FUROSEMIDE 40 MG: 10 INJECTION, SOLUTION INTRAMUSCULAR; INTRAVENOUS at 17:22

## 2022-08-12 RX ADMIN — OXYCODONE HYDROCHLORIDE AND ACETAMINOPHEN 1 TABLET: 7.5; 325 TABLET ORAL at 18:35

## 2022-08-12 RX ADMIN — FOLIC ACID 1 MG: 1 TABLET ORAL at 08:58

## 2022-08-12 RX ADMIN — Medication 10 ML: at 21:20

## 2022-08-12 RX ADMIN — METOPROLOL SUCCINATE 25 MG: 25 TABLET, EXTENDED RELEASE ORAL at 04:59

## 2022-08-12 RX ADMIN — ASPIRIN 81 MG: 81 TABLET, COATED ORAL at 08:58

## 2022-08-12 RX ADMIN — FUROSEMIDE 40 MG: 10 INJECTION, SOLUTION INTRAMUSCULAR; INTRAVENOUS at 08:58

## 2022-08-12 RX ADMIN — ACETAMINOPHEN 325MG 650 MG: 325 TABLET ORAL at 05:11

## 2022-08-12 RX ADMIN — ACETAMINOPHEN 325MG 650 MG: 325 TABLET ORAL at 23:14

## 2022-08-12 NOTE — PLAN OF CARE
Goal Outcome Evaluation: Patient complaint of overall pain see MAR. Covid test positive. Transfer to .

## 2022-08-12 NOTE — CASE MANAGEMENT/SOCIAL WORK
Hf cm reviewed chart. Pt in enhanced isolation due to covid positive dx. HF cm called into pts room to provide explanation and education. Pt seen by HF cm in March during last admission.     Pt alert and oriented. Pt admitted for CHF. BNP >6900 neg trop, EF 51% March 2022, not ordered this admission. Pt sees Dr Mills as outpt. No upcoming appts. Dr Lima following.     Pt lives at home with is wife. Pt is independent. Pt is complaint with medications and follow up appts. Pt currently not prescribed diuretic at home.     Heart failure education provided. Pt states he does not eat low Na diet or do daily weights. Educated pt on importance of eating low Na diet and doing daily weights. Pt states his wife prepares his foods. Pt states he still has heart failure handouts given to him in March. Encouraged pt to revisit folder for tips on eating low Na diet. Educated pt on purpose of diuretic since pt is not currently on medication. Pt v/u. Additional heart failure literature and daily weight log added to AVS for discharge.     Pt denies any questions. Encouraged pt to call HF cm if needed after discharge.     Will continue to follow.

## 2022-08-12 NOTE — PROGRESS NOTES
[facility]     Progress Note    Patient Name: Hai Gold  : 1955  MRN: [id number]  Primary Care Physician:  Shelly Mejia MD  Date of admission: 2022    Subjective   Subjective     Chief Complaint: CHF,Dyspnea    HPI:  Patient Reports He was admitted with Dyspnea and CHF,his BNP was high. He was started on IV Lasix and is doing well. He had Covid a month ago and is still positive on the screen.    Review of Systems   Review of Systems   Constitutional: Positive for fatigue. Negative for fever. Activity change: in bed.   Eyes: Negative.    Respiratory: Shortness of breath: better.    Cardiovascular: Leg swelling: better.   Gastrointestinal: Negative.    Endocrine: Negative.    Genitourinary: Negative.    Musculoskeletal: Negative.    Skin: Negative.    Allergic/Immunologic: Negative.    Neurological: Positive for weakness.   Hematological: Negative.    Psychiatric/Behavioral: Negative.        Objective   Objective     Vitals:   Temp:  [97.3 °F (36.3 °C)-99.5 °F (37.5 °C)] 97.5 °F (36.4 °C)  Heart Rate:  [58-99] 84  Resp:  [16-18] 18  BP: (103-126)/(68-91) 103/68  Physical Exam    Constitutional: Awake, alert   Eyes: PERRLA, sclerae anicteric, no conjunctival injection   HENT: NCAT, mucous membranes moist   Neck: Supple, no thyromegaly, no lymphadenopathy, trachea midline   Respiratory: Clear to auscultation bilaterally, nonlabored respirations    Cardiovascular: RRR, no murmurs, rubs, or gallops, palpable pedal pulses bilaterally   Gastrointestinal: Positive bowel sounds, soft, nontender, nondistended   Musculoskeletal: No bilateral ankle edema, no clubbing or cyanosis to extremities   Psychiatric: Appropriate affect, cooperative   Neurologic: Oriented x 3, strength symmetric in all extremities, Cranial Nerves grossly intact to confrontation, speech clear   Skin: No rashes     Result Review    Result Review:  I have personally reviewed the results from the time of this admission to  8/12/2022 15:08 EDT and agree with these findings:  [x]  Laboratory  []  Microbiology  []  Radiology  []  EKG/Telemetry   []  Cardiology/Vascular   []  Pathology  []  Old records  []  Other:  Most notable findings include: Covid 19 positive    Assessment & Plan   Assessment / Plan     Brief Patient Summary:  Hai Gold is a 67 y.o. male who has CHF,says he was taking his meds.Says he was treated for Covid.    Active Hospital Problems:  Active Hospital Problems    Diagnosis    • CHF (congestive heart failure) (HCC)    • Dyspnea      Plan:    Continue IV Lasix  Stop IV Remdesivir and dexamethasone  Labs in am    DVT prophylaxis:  Medical DVT prophylaxis orders are present.    CODE STATUS:   Level Of Support Discussed With: Patient  Code Status (Patient has no pulse and is not breathing): CPR (Attempt to Resuscitate)  Medical Interventions (Patient has pulse or is breathing): Full Support      Electronically signed by Sunshine Joy MD, 08/12/22, 3:08 PM EDT.

## 2022-08-13 LAB
ALBUMIN SERPL-MCNC: 3.4 G/DL (ref 3.5–5.2)
ALBUMIN/GLOB SERPL: 0.8 G/DL
ALP SERPL-CCNC: 75 U/L (ref 39–117)
ALT SERPL W P-5'-P-CCNC: 7 U/L (ref 1–41)
ANION GAP SERPL CALCULATED.3IONS-SCNC: 8.1 MMOL/L (ref 5–15)
AST SERPL-CCNC: 12 U/L (ref 1–40)
BASOPHILS # BLD AUTO: 0.05 10*3/MM3 (ref 0–0.2)
BASOPHILS NFR BLD AUTO: 0.5 % (ref 0–1.5)
BILIRUB SERPL-MCNC: 0.5 MG/DL (ref 0–1.2)
BUN SERPL-MCNC: 19 MG/DL (ref 8–23)
BUN/CREAT SERPL: 20.4 (ref 7–25)
CALCIUM SPEC-SCNC: 9.2 MG/DL (ref 8.6–10.5)
CHLORIDE SERPL-SCNC: 98 MMOL/L (ref 98–107)
CO2 SERPL-SCNC: 29.9 MMOL/L (ref 22–29)
CREAT SERPL-MCNC: 0.93 MG/DL (ref 0.76–1.27)
DEPRECATED RDW RBC AUTO: 54.5 FL (ref 37–54)
EGFRCR SERPLBLD CKD-EPI 2021: 90 ML/MIN/1.73
EOSINOPHIL # BLD AUTO: 0.13 10*3/MM3 (ref 0–0.4)
EOSINOPHIL NFR BLD AUTO: 1.4 % (ref 0.3–6.2)
ERYTHROCYTE [DISTWIDTH] IN BLOOD BY AUTOMATED COUNT: 16.9 % (ref 12.3–15.4)
GLOBULIN UR ELPH-MCNC: 4.5 GM/DL
GLUCOSE SERPL-MCNC: 79 MG/DL (ref 65–99)
HCT VFR BLD AUTO: 33.2 % (ref 37.5–51)
HGB BLD-MCNC: 11.4 G/DL (ref 13–17.7)
IMM GRANULOCYTES # BLD AUTO: 0.03 10*3/MM3 (ref 0–0.05)
IMM GRANULOCYTES NFR BLD AUTO: 0.3 % (ref 0–0.5)
LYMPHOCYTES # BLD AUTO: 2.04 10*3/MM3 (ref 0.7–3.1)
LYMPHOCYTES NFR BLD AUTO: 21.9 % (ref 19.6–45.3)
MCH RBC QN AUTO: 30.5 PG (ref 26.6–33)
MCHC RBC AUTO-ENTMCNC: 34.3 G/DL (ref 31.5–35.7)
MCV RBC AUTO: 88.8 FL (ref 79–97)
MONOCYTES # BLD AUTO: 0.69 10*3/MM3 (ref 0.1–0.9)
MONOCYTES NFR BLD AUTO: 7.4 % (ref 5–12)
NEUTROPHILS NFR BLD AUTO: 6.37 10*3/MM3 (ref 1.7–7)
NEUTROPHILS NFR BLD AUTO: 68.5 % (ref 42.7–76)
NRBC BLD AUTO-RTO: 0 /100 WBC (ref 0–0.2)
PLATELET # BLD AUTO: 378 10*3/MM3 (ref 140–450)
PMV BLD AUTO: 9.9 FL (ref 6–12)
POTASSIUM SERPL-SCNC: 3.8 MMOL/L (ref 3.5–5.2)
PROT SERPL-MCNC: 7.9 G/DL (ref 6–8.5)
RBC # BLD AUTO: 3.74 10*6/MM3 (ref 4.14–5.8)
SODIUM SERPL-SCNC: 136 MMOL/L (ref 136–145)
WBC NRBC COR # BLD: 9.31 10*3/MM3 (ref 3.4–10.8)

## 2022-08-13 PROCEDURE — 94760 N-INVAS EAR/PLS OXIMETRY 1: CPT

## 2022-08-13 PROCEDURE — 25010000002 ENOXAPARIN PER 10 MG: Performed by: INTERNAL MEDICINE

## 2022-08-13 PROCEDURE — 80053 COMPREHEN METABOLIC PANEL: CPT | Performed by: INTERNAL MEDICINE

## 2022-08-13 PROCEDURE — 25010000002 FUROSEMIDE PER 20 MG: Performed by: INTERNAL MEDICINE

## 2022-08-13 PROCEDURE — 94799 UNLISTED PULMONARY SVC/PX: CPT

## 2022-08-13 PROCEDURE — 85025 COMPLETE CBC W/AUTO DIFF WBC: CPT | Performed by: INTERNAL MEDICINE

## 2022-08-13 RX ORDER — FUROSEMIDE 40 MG/1
40 TABLET ORAL
Status: DISCONTINUED | OUTPATIENT
Start: 2022-08-13 | End: 2022-08-14 | Stop reason: HOSPADM

## 2022-08-13 RX ORDER — OXYCODONE AND ACETAMINOPHEN 7.5; 325 MG/1; MG/1
1 TABLET ORAL EVERY 8 HOURS PRN
Status: DISCONTINUED | OUTPATIENT
Start: 2022-08-13 | End: 2022-08-14 | Stop reason: HOSPADM

## 2022-08-13 RX ADMIN — ACETAMINOPHEN 325MG 650 MG: 325 TABLET ORAL at 12:07

## 2022-08-13 RX ADMIN — Medication 10 ML: at 08:20

## 2022-08-13 RX ADMIN — FOLIC ACID 1 MG: 1 TABLET ORAL at 08:20

## 2022-08-13 RX ADMIN — OXYCODONE HYDROCHLORIDE AND ACETAMINOPHEN 1 TABLET: 7.5; 325 TABLET ORAL at 23:40

## 2022-08-13 RX ADMIN — Medication 10 ML: at 21:31

## 2022-08-13 RX ADMIN — FUROSEMIDE 40 MG: 10 INJECTION, SOLUTION INTRAMUSCULAR; INTRAVENOUS at 08:20

## 2022-08-13 RX ADMIN — OXYCODONE HYDROCHLORIDE AND ACETAMINOPHEN 1 TABLET: 7.5; 325 TABLET ORAL at 15:32

## 2022-08-13 RX ADMIN — LINACLOTIDE 145 MCG: 145 CAPSULE, GELATIN COATED ORAL at 06:40

## 2022-08-13 RX ADMIN — FUROSEMIDE 40 MG: 40 TABLET ORAL at 17:44

## 2022-08-13 RX ADMIN — METOPROLOL SUCCINATE 25 MG: 25 TABLET, EXTENDED RELEASE ORAL at 08:20

## 2022-08-13 RX ADMIN — OXYCODONE HYDROCHLORIDE AND ACETAMINOPHEN 1 TABLET: 7.5; 325 TABLET ORAL at 06:40

## 2022-08-13 RX ADMIN — ASPIRIN 81 MG: 81 TABLET, COATED ORAL at 08:20

## 2022-08-13 RX ADMIN — ENOXAPARIN SODIUM 40 MG: 40 INJECTION SUBCUTANEOUS at 08:20

## 2022-08-13 NOTE — PROGRESS NOTES
[facility]     Progress Note    Patient Name: Hai Gold  : 1955  MRN: [id number]  Primary Care Physician:  Shelly Mejia MD  Date of admission: 2022    Subjective   Subjective     Chief Complaint: CHF, dyspnea    HPI:  Patient Reports as he was previously treated for COVID his treatment stopped.  He is on IV Lasix and diuresing well.     Review of Systems   Review of Systems   Constitutional: Positive for fatigue. Activity change: up in room.   HENT: Negative.    Eyes: Negative.    Respiratory: Shortness of breath: better.    Cardiovascular: Leg swelling: better.   Gastrointestinal: Negative.    Endocrine: Negative.    Genitourinary: Negative.    Musculoskeletal: Negative.    Skin: Negative.    Allergic/Immunologic: Negative.    Neurological: Negative.    Hematological: Negative.    Psychiatric/Behavioral: Negative.        Objective   Objective     Vitals:   Temp:  [97.3 °F (36.3 °C)-98 °F (36.7 °C)] 97.5 °F (36.4 °C)  Heart Rate:  [] 77  Resp:  [18] 18  BP: (104-122)/(60-87) 110/60  Physical Exam    Constitutional: Awake, alert   Eyes: PERRLA, sclerae anicteric, no conjunctival injection   HENT: NCAT, mucous membranes moist   Neck: Supple, no thyromegaly, no lymphadenopathy, trachea midline   Respiratory: Clear to auscultation bilaterally, nonlabored respirations    Cardiovascular: RRR, no murmurs, rubs, or gallops, palpable pedal pulses bilaterally   Gastrointestinal: Positive bowel sounds, soft, nontender, nondistended   Musculoskeletal: No bilateral ankle edema, no clubbing or cyanosis to extremities   Psychiatric: Appropriate affect, cooperative   Neurologic: Oriented x 3, strength symmetric in all extremities, Cranial Nerves grossly intact to confrontation, speech clear   Skin: No rashes     Result Review    Result Review:  I have personally reviewed the results from the time of this admission to 2022 12:42 EDT and agree with these findings:  [x]  Laboratory  []   Microbiology  []  Radiology  []  EKG/Telemetry   []  Cardiology/Vascular   []  Pathology  []  Old records  []  Other:  Most notable findings include: CHF improving with IV Lasix    Assessment & Plan   Assessment / Plan     Brief Patient Summary:  Hai Gold is a 67 y.o. male who says he was not on Lasix at home.    Active Hospital Problems:  Active Hospital Problems    Diagnosis    • CHF (congestive heart failure) (HCC)    • Dyspnea      Plan:   Not on potassium but levels are good  Recheck labs a.m.  Switch to p.o. Lasix    DVT prophylaxis:  Medical DVT prophylaxis orders are present.    CODE STATUS:   Level Of Support Discussed With: Patient  Code Status (Patient has no pulse and is not breathing): CPR (Attempt to Resuscitate)  Medical Interventions (Patient has pulse or is breathing): Full Support      Electronically signed by Sunshine Joy MD, 08/13/22, 12:42 PM EDT.

## 2022-08-13 NOTE — PROGRESS NOTES
CARDIOLOGY  INPATIENT PROGRESS NOTE                [facility] 4TH FLOOR MEDICAL TELEMETRY UNIT    8/13/2022      PATIENT IDENTIFICATION:   Name:  Hai Gold      MRN:  [id number]     67 y.o.  male                 SUBJECTIVE:   Breathing has improved  COVID is positive    OBJECTIVE:  Vitals:    08/13/22 0500 08/13/22 0711 08/13/22 0729 08/13/22 1118   BP:   104/67 110/60   BP Location:   Left arm Left arm   Patient Position:   Lying Lying   Pulse:   73 77   Resp:   18 18   Temp:   97.9 °F (36.6 °C) 97.5 °F (36.4 °C)   TempSrc:   Oral Oral   SpO2:  96% 100% 100%   Weight: 61 kg (134 lb 7.7 oz)      Height:               Body mass index is 21.06 kg/m².    Intake/Output Summary (Last 24 hours) at 8/13/2022 1436  Last data filed at 8/13/2022 0815  Gross per 24 hour   Intake 390 ml   Output --   Net 390 ml       Telemetry:     Physical Exam  General Appearance:   · no acute distress  · Alert and oriented x3  HENT:   · lips not cyanotic  · Atraumatic  Neck:  · No jvd   · supple  Respiratory:  · no respiratory distress  · normal breath sounds  · no rales  Cardiovascular:    · regular rhythm  · no S3, no S4   · no murmur  · no rub  · lower extremity edema: none    Skin:   · warm, dry  · No rashes      Allergies   Allergen Reactions   • Penicillins Hives       Scheduled meds:  aspirin, 81 mg, Oral, Daily  enoxaparin, 40 mg, Subcutaneous, Daily  folic acid, 1 mg, Oral, Daily  furosemide, 40 mg, Oral, BID  [START ON 8/15/2022] etanercept, 50 mg, Subcutaneous, Weekly  linaclotide, 145 mcg, Oral, QAM  [START ON 8/17/2022] methotrexate, 12.5 mg, Oral, Weekly  metoprolol succinate XL, 25 mg, Oral, Daily  sodium chloride, 10 mL, Intravenous, Q12H      IV meds:                           Data Review:  CBC    CBC 5/30/22 8/11/22 8/13/22   WBC 5.41 7.77 9.31   RBC 4.33 3.71 (A) 3.74 (A)   Hemoglobin 12.6 (A) 11.2 (A) 11.4 (A)   Hematocrit 38.8 33.2 (A) 33.2 (A)   MCV 89.6 89.5 88.8   MCH 29.1 30.2 30.5   MCHC 32.5  33.7 34.3   RDW 14.1 17.4 (A) 16.9 (A)   Platelets 391 337 378   (A) Abnormal value            CMP    CMP 5/30/22 8/11/22 8/13/22   Glucose 92 140 (A) 79   BUN 11 14 19   Creatinine 0.94 0.87 0.93   Sodium 132 (A) 135 (A) 136   Potassium 4.7 4.1 3.8   Chloride 96 (A) 102 98   Calcium 9.5 9.1 9.2   Albumin 3.70 3.60 3.40 (A)   Total Bilirubin 0.2 0.4 0.5   Alkaline Phosphatase 79 77 75   AST (SGOT) 13 13 12   ALT (SGPT) 7 7 7   (A) Abnormal value             CARDIAC LABS:      Lab 08/11/22  0441   PROBNP 6,960.0*   TROPONIN T <0.010        No results found for: DIGOXIN   Lab Results   Component Value Date    TSH 2.120 04/11/2022           Invalid input(s): LDLCALC  Lab Results   Component Value Date    POCTROP 0.03 08/11/2022     Lab Results   Component Value Date    TROPONINT <0.010 08/11/2022   (  Lab Results   Component Value Date    MG 1.8 08/11/2022     Results for orders placed during the hospital encounter of 03/26/22    Adult Transthoracic Echo Complete With Contrast if Necessary Per Protocol    Interpretation Summary  · Aneurysmal dilation of the aortic root is present.  · Left ventricular systolic function is low normal.  · Left ventricular diastolic function was indeterminate.    There were no apparent intracardiac masses, vegetations or thrombi.           ASSESSMENT:  Congestive heart failure acute on chronic systolic  Hypertension  Atrial fibrillation with controlled rate      PLAN:   Continue diuresis  CMP in a.m.  May need to be switched to oral anticoagulation later on          Judith Lima MD  8/13/2022    14:36 EDT

## 2022-08-13 NOTE — PLAN OF CARE
Goal Outcome Evaluation:  Plan of Care Reviewed With: patient        Progress: improving   Remains in enhanced airborne isolation. No acute respiratory distress noted at present time. Diuresis in therapy in use. No edema noted at present. Vital signs within normal limits. Will continue to monitor.

## 2022-08-14 ENCOUNTER — READMISSION MANAGEMENT (OUTPATIENT)
Dept: CALL CENTER | Facility: HOSPITAL | Age: 67
End: 2022-08-14

## 2022-08-14 VITALS
OXYGEN SATURATION: 100 % | WEIGHT: 134.48 LBS | BODY MASS INDEX: 21.11 KG/M2 | TEMPERATURE: 98.2 F | HEIGHT: 67 IN | RESPIRATION RATE: 18 BRPM | SYSTOLIC BLOOD PRESSURE: 103 MMHG | DIASTOLIC BLOOD PRESSURE: 77 MMHG | HEART RATE: 64 BPM

## 2022-08-14 PROBLEM — D89.831 CYTOKINE RELEASE SYNDROME, GRADE 1: Chronic | Status: ACTIVE | Noted: 2022-08-14

## 2022-08-14 PROBLEM — R06.00 DYSPNEA: Status: RESOLVED | Noted: 2022-08-11 | Resolved: 2022-08-14

## 2022-08-14 PROBLEM — D89.832 CYTOKINE RELEASE SYNDROME, GRADE 2: Status: ACTIVE | Noted: 2022-08-14

## 2022-08-14 PROBLEM — I50.21 ACUTE SYSTOLIC HEART FAILURE (HCC): Status: ACTIVE | Noted: 2022-08-14

## 2022-08-14 LAB
ALBUMIN SERPL-MCNC: 3.2 G/DL (ref 3.5–5.2)
ALBUMIN/GLOB SERPL: 0.7 G/DL
ALP SERPL-CCNC: 72 U/L (ref 39–117)
ALT SERPL W P-5'-P-CCNC: 7 U/L (ref 1–41)
ANION GAP SERPL CALCULATED.3IONS-SCNC: 9.5 MMOL/L (ref 5–15)
AST SERPL-CCNC: 10 U/L (ref 1–40)
BILIRUB SERPL-MCNC: 0.3 MG/DL (ref 0–1.2)
BUN SERPL-MCNC: 21 MG/DL (ref 8–23)
BUN/CREAT SERPL: 23.9 (ref 7–25)
CALCIUM SPEC-SCNC: 8.7 MG/DL (ref 8.6–10.5)
CHLORIDE SERPL-SCNC: 97 MMOL/L (ref 98–107)
CO2 SERPL-SCNC: 26.5 MMOL/L (ref 22–29)
CREAT SERPL-MCNC: 0.88 MG/DL (ref 0.76–1.27)
EGFRCR SERPLBLD CKD-EPI 2021: 94.2 ML/MIN/1.73
GLOBULIN UR ELPH-MCNC: 4.7 GM/DL
GLUCOSE SERPL-MCNC: 83 MG/DL (ref 65–99)
POTASSIUM SERPL-SCNC: 3.8 MMOL/L (ref 3.5–5.2)
PROT SERPL-MCNC: 7.9 G/DL (ref 6–8.5)
QT INTERVAL: 331 MS
QT INTERVAL: 345 MS
SODIUM SERPL-SCNC: 133 MMOL/L (ref 136–145)

## 2022-08-14 PROCEDURE — 80053 COMPREHEN METABOLIC PANEL: CPT | Performed by: INTERNAL MEDICINE

## 2022-08-14 PROCEDURE — 94799 UNLISTED PULMONARY SVC/PX: CPT

## 2022-08-14 RX ORDER — FUROSEMIDE 40 MG/1
40 TABLET ORAL 2 TIMES DAILY
Qty: 180 TABLET | Refills: 0 | Status: SHIPPED | OUTPATIENT
Start: 2022-08-14 | End: 2022-09-23 | Stop reason: HOSPADM

## 2022-08-14 RX ORDER — POTASSIUM CHLORIDE 750 MG/1
10 TABLET, FILM COATED, EXTENDED RELEASE ORAL DAILY
Qty: 30 TABLET | Refills: 0 | Status: SHIPPED | OUTPATIENT
Start: 2022-08-14 | End: 2022-09-23 | Stop reason: HOSPADM

## 2022-08-14 RX ADMIN — METOPROLOL SUCCINATE 25 MG: 25 TABLET, EXTENDED RELEASE ORAL at 08:33

## 2022-08-14 RX ADMIN — ASPIRIN 81 MG: 81 TABLET, COATED ORAL at 08:33

## 2022-08-14 RX ADMIN — OXYCODONE HYDROCHLORIDE AND ACETAMINOPHEN 1 TABLET: 7.5; 325 TABLET ORAL at 10:50

## 2022-08-14 RX ADMIN — FOLIC ACID 1 MG: 1 TABLET ORAL at 08:33

## 2022-08-14 RX ADMIN — FUROSEMIDE 40 MG: 40 TABLET ORAL at 08:33

## 2022-08-14 RX ADMIN — LINACLOTIDE 145 MCG: 145 CAPSULE, GELATIN COATED ORAL at 06:09

## 2022-08-14 RX ADMIN — Medication 10 ML: at 08:33

## 2022-08-14 NOTE — PROGRESS NOTES
CARDIOLOGY  INPATIENT PROGRESS NOTE                [facility] 4TH FLOOR MEDICAL TELEMETRY UNIT    8/14/2022      PATIENT IDENTIFICATION:   Name:  Hai Gold      MRN:  [id number]     67 y.o.  male                 SUBJECTIVE:   Breathing better  No chest pains    OBJECTIVE:  Vitals:    08/14/22 0011 08/14/22 0500 08/14/22 0742 08/14/22 1009   BP: 111/74  121/76    BP Location: Left arm  Left arm    Patient Position: Sitting  Lying    Pulse: 74  75    Resp: 18  18    Temp: 97.7 °F (36.5 °C)  98.1 °F (36.7 °C)    TempSrc: Oral  Oral    SpO2: 100%  100% 98%   Weight:  61 kg (134 lb 7.7 oz)     Height:               Body mass index is 21.06 kg/m².    Intake/Output Summary (Last 24 hours) at 8/14/2022 1200  Last data filed at 8/14/2022 1009  Gross per 24 hour   Intake 960 ml   Output 1000 ml   Net -40 ml       Telemetry:     Physical Exam  General Appearance:   · no acute distress  · Alert and oriented x3  HENT:   · lips not cyanotic  · Atraumatic  Neck:  · No jvd   · supple  Respiratory:  · no respiratory distress  · normal breath sounds  · no rales  Cardiovascular:    · regular rhythm  · no S3, no S4   · no murmur  · no rub  · lower extremity edema: none    Skin:   · warm, dry  · No rashes      Allergies   Allergen Reactions   • Penicillins Hives       Scheduled meds:  aspirin, 81 mg, Oral, Daily  enoxaparin, 40 mg, Subcutaneous, Daily  folic acid, 1 mg, Oral, Daily  furosemide, 40 mg, Oral, BID  [START ON 8/15/2022] etanercept, 50 mg, Subcutaneous, Weekly  linaclotide, 145 mcg, Oral, QAM  [START ON 8/17/2022] methotrexate, 12.5 mg, Oral, Weekly  metoprolol succinate XL, 25 mg, Oral, Daily  sodium chloride, 10 mL, Intravenous, Q12H      IV meds:                           Data Review:  CBC    CBC 5/30/22 8/11/22 8/13/22   WBC 5.41 7.77 9.31   RBC 4.33 3.71 (A) 3.74 (A)   Hemoglobin 12.6 (A) 11.2 (A) 11.4 (A)   Hematocrit 38.8 33.2 (A) 33.2 (A)   MCV 89.6 89.5 88.8   MCH 29.1 30.2 30.5   MCHC 32.5  33.7 34.3   RDW 14.1 17.4 (A) 16.9 (A)   Platelets 391 337 378   (A) Abnormal value            CMP    CMP 8/11/22 8/13/22 8/14/22   Glucose 140 (A) 79 83   BUN 14 19 21   Creatinine 0.87 0.93 0.88   Sodium 135 (A) 136 133 (A)   Potassium 4.1 3.8 3.8   Chloride 102 98 97 (A)   Calcium 9.1 9.2 8.7   Albumin 3.60 3.40 (A) 3.20 (A)   Total Bilirubin 0.4 0.5 0.3   Alkaline Phosphatase 77 75 72   AST (SGOT) 13 12 10   ALT (SGPT) 7 7 7   (A) Abnormal value             CARDIAC LABS:      Lab 08/11/22  0441   PROBNP 6,960.0*   TROPONIN T <0.010        No results found for: DIGOXIN   Lab Results   Component Value Date    TSH 2.120 04/11/2022           Invalid input(s): LDLCALC  Lab Results   Component Value Date    POCTROP 0.03 08/11/2022     Lab Results   Component Value Date    TROPONINT <0.010 08/11/2022   (  Lab Results   Component Value Date    MG 1.8 08/11/2022     Results for orders placed during the hospital encounter of 03/26/22    Adult Transthoracic Echo Complete With Contrast if Necessary Per Protocol    Interpretation Summary  · Aneurysmal dilation of the aortic root is present.  · Left ventricular systolic function is low normal.  · Left ventricular diastolic function was indeterminate.    There were no apparent intracardiac masses, vegetations or thrombi.           ASSESSMENT:  Congestive heart failure acute on chronic systolic  Hypertension  Atrial fibrillation with controlled rate         PLAN:   Continue diuresis  CMP in a.m.            Judith Lima MD  8/14/2022    12:00 EDT

## 2022-08-14 NOTE — DISCHARGE SUMMARY
[facility]         DISCHARGE SUMMARY    Patient Name: Hai Gold  : 1955  MRN: [id number]    Date of Admission: 2022  Date of Discharge: 2022  Primary Care Physician: Shelly Mejia MD    Consults     Date and Time Order Name Status Description    2022  1:27 PM Cardiology (on-call MD unless specified)      2022  9:39 AM Inpatient Cardiology Consult      2022  6:35 AM IP General Consult (Use specialty-specific consult if known)            Presenting Problem:   CHF (congestive heart failure) (HCC) [I50.9]  Tachycardia [R00.0]  Acute on chronic congestive heart failure, unspecified heart failure type (HCC) [I50.9]  COVID-19 [U07.1]    Active and Resolved Hospital Problems:  Active Hospital Problems    Diagnosis POA   • CHF (congestive heart failure) (HCC) [I50.9] Yes     Priority: High   • Acute systolic heart failure (CMS/HCC) [I50.21] Unknown   • Cytokine release syndrome, grade 2 [D89.832] No      Resolved Hospital Problems    Diagnosis POA   • Dyspnea [R06.00] Yes     Priority: Medium         Hospital Course     Hospital Course:  Hai Gold is a 67 y.o. male who was admitted with acute on chronic systolic congestive heart failure.  His chest x-ray showed bilateral infiltrates versus congestion.he was started on IV Lasix and diuresed well.He also had COVID test positive.  After admission he said that he was COVID positive during the past month and was treated outpatient, so remdesivir and dexamethasone were stopped.  He sees Dr. Matheus Argueta cardiology covered by Dr. Shepherd who saw him.  He was continued on same medications.  He also had A. fib with controlled rate and hypertension.  He was discharged home on his usual medications with addition of Lasix 40 mg twice a day and KCl 10 mg once a day on 2022 to follow-up with his usual doctors.        DISCHARGE Follow Up Recommendations for labs and diagnostics: PMD      Day of Discharge      Vital Signs:  Temp:  [97.5 °F (36.4 °C)-98.2 °F (36.8 °C)] 98.2 °F (36.8 °C)  Heart Rate:  [64-75] 64  Resp:  [18] 18  BP: (103-121)/(62-77) 103/77  Physical Exam:  Constitutional: Awake, alert   Eyes: PERRLA, sclerae anicteric, no conjunctival injection   HENT: NCAT, mucous membranes moist   Neck: Supple, no thyromegaly, no lymphadenopathy, trachea midline   Respiratory: Clear to auscultation bilaterally, nonlabored respirations    Cardiovascular: RRR, no murmurs, rubs, or gallops, palpable pedal pulses bilaterally   Gastrointestinal: Positive bowel sounds, soft, nontender, nondistended   Musculoskeletal: No bilateral ankle edema, no clubbing or cyanosis to extremities   Psychiatric: Appropriate affect, cooperative   Neurologic: Oriented x 3, strength symmetric in all extremities, Cranial Nerves grossly intact to confrontation, speech clear   Skin: No rashes       Pertinent  and/or Most Recent Results     LAB RESULTS:      Lab 08/13/22  0529 08/11/22  0441   WBC 9.31 7.77   HEMOGLOBIN 11.4* 11.2*   HEMATOCRIT 33.2* 33.2*   PLATELETS 378 337   NEUTROS ABS 6.37 5.62   IMMATURE GRANS (ABS) 0.03 0.03   LYMPHS ABS 2.04 1.58   MONOS ABS 0.69 0.44   EOS ABS 0.13 0.05   MCV 88.8 89.5         Lab 08/14/22  0608 08/13/22  0529 08/11/22  0441   SODIUM 133* 136 135*   POTASSIUM 3.8 3.8 4.1   CHLORIDE 97* 98 102   CO2 26.5 29.9* 25.4   ANION GAP 9.5 8.1 7.6   BUN 21 19 14   CREATININE 0.88 0.93 0.87   EGFR 94.2 90.0 94.6   GLUCOSE 83 79 140*   CALCIUM 8.7 9.2 9.1   MAGNESIUM  --   --  1.8         Lab 08/14/22  0608 08/13/22  0529 08/11/22  0441   TOTAL PROTEIN 7.9 7.9 8.2   ALBUMIN 3.20* 3.40* 3.60   GLOBULIN 4.7 4.5 4.6   ALT (SGPT) 7 7 7   AST (SGOT) 10 12 13   BILIRUBIN 0.3 0.5 0.4   ALK PHOS 72 75 77   LIPASE  --   --  9*         Lab 08/11/22  0441   PROBNP 6,960.0*   TROPONIN T <0.010                 Brief Urine Lab Results  (Last result in the past 365 days)      Color   Clarity   Blood   Leuk Est   Nitrite   Protein    CREAT   Urine HCG        03/27/22 0639 Yellow   Clear   Negative   Small (1+)   Negative   Negative               Microbiology Results (last 10 days)     Procedure Component Value - Date/Time    COVID PRE-OP / PRE-PROCEDURE SCREENING ORDER (NO ISOLATION) - Swab, Nasopharynx [[id number]]  (Abnormal) Collected: 08/11/22 1330    Lab Status: Final result Specimen: Swab from Nasopharynx Updated: 08/11/22 2121    Narrative:      The following orders were created for panel order COVID PRE-OP / PRE-PROCEDURE SCREENING ORDER (NO ISOLATION) - Swab, Nasopharynx.  Procedure                               Abnormality         Status                     ---------                               -----------         ------                     COVID-19,APTIMA PANTHER(...[[id number]]  Abnormal            Final result                 Please view results for these tests on the individual orders.    COVID-19,APTIMA PANTHER(ADELINA),[facility], NP/OP SWAB IN UTM/VTM/SALINE TRANSPORT MEDIA,24 HR TAT - Swab, Nasopharynx [[id number]]  (Abnormal) Collected: 08/11/22 1330    Lab Status: Final result Specimen: Swab from Nasopharynx Updated: 08/11/22 2121     COVID19 Detected    Narrative:      Fact sheet for providers: https://www.fda.gov/media/251878/download     Fact sheet for patients: https://www.fda.gov/media/830699/download    Test performed by RT PCR.          XR Chest 1 View    Result Date: 8/11/2022  Impression:  Increased bilateral infiltrates are seen.  The findings may represent infectious multifocal pneumonia.  Pulmonary edema is possible.  There may be a small right pleural effusion.      COMMENT:  Part of this note is an electronic transcription of spoken language to printed text. The electronic translation/transcription may permit erroneous, or at times, nonsensical (or even sensical) words or phrases to be inadvertently transcribed or omitted; this  has reviewed the note for such errors (as well as additional errors);  however, some may still exist.  JOAN MEEK JR, MD       Electronically Signed and Approved By: JOAN MEEK JR, MD on 8/11/2022 at 5:36                        Results for orders placed during the hospital encounter of 03/26/22    Adult Transthoracic Echo Complete With Contrast if Necessary Per Protocol    Interpretation Summary  · Aneurysmal dilation of the aortic root is present.  · Left ventricular systolic function is low normal.  · Left ventricular diastolic function was indeterminate.    There were no apparent intracardiac masses, vegetations or thrombi.      Labs Pending at Discharge: None        Discharge Details        Discharge Medications      New Medications      Instructions Start Date   furosemide 40 MG tablet  Commonly known as: LASIX   40 mg, Oral, 2 Times Daily      potassium chloride 10 MEQ CR tablet   10 mEq, Oral, Daily         Continue These Medications      Instructions Start Date   aspirin 81 MG EC tablet   81 mg, Oral, Daily      Enbrel Mini 50 MG/ML solution cartridge  Generic drug: Etanercept   50 mg, Subcutaneous, Every 7 Days, Mon      folic acid 1 MG tablet  Commonly known as: FOLVITE   1 mg, Oral, Daily      linaclotide 145 MCG capsule capsule  Commonly known as: LINZESS   145 mcg, Oral, Every Morning Before Breakfast      methotrexate 2.5 MG tablet   12.5 mg, Oral, Weekly, Friday (5 tabs)      metoprolol succinate XL 25 MG 24 hr tablet  Commonly known as: TOPROL-XL   25 mg, Oral, Daily      oxyCODONE-acetaminophen 7.5-325 MG per tablet  Commonly known as: PERCOCET   1 tablet, Oral, 2 Times Daily PRN      Testosterone Enanthate 200 MG/ML solution   200 mg, Intramuscular, Every 14 Days, Wed             Allergies   Allergen Reactions   • Penicillins Hives         Discharge Disposition:  Home or Self Care    Patient Condition on discharge: Stable    Diet:  Hospital:  Diet Order   Procedures   • Diet Regular; Cardiac         Discharge Activity: As tolerated        CODE STATUS:  Code  Status and Medical Interventions:   Ordered at: 08/11/22 0741     Level Of Support Discussed With:    Patient     Code Status (Patient has no pulse and is not breathing):    CPR (Attempt to Resuscitate)     Medical Interventions (Patient has pulse or is breathing):    Full Support         No future appointments.  Follow-up with Dr. Matheus Argueta in Dr. Mejia        Electronically signed by Sunshine Joy MD, 08/14/22, 1:35 PM EDT.

## 2022-08-14 NOTE — PLAN OF CARE
Goal Outcome Evaluation:  Plan of Care Reviewed With: patient        Progress: improving   No acute distress at present time. One five beat run of VTACH this shift but denies any complaint of chest pain. Remains in Afib with short intervals of sinus rhythm. Will continue to monitor.

## 2022-08-15 NOTE — CASE MANAGEMENT/SOCIAL WORK
HF cm reviewed chart.     Pt discharged over the weekend, after HF cm office hours.     Lasix added to home regimen at dc.     No orders to follow up with Cardiology after dc. Pt already has appt scheduled in Nov 2022.

## 2022-08-15 NOTE — OUTREACH NOTE
Prep Survey    Flowsheet Row Responses   [facility] discharged from? [facility]   Is LACE score < 7 ? No   Emergency Room discharge w/ pulse ox? No   Eligibility Readm Mgmt   Discharge diagnosis A/C CHF, COVID-19 in July 2022   Does the patient have one of the following disease processes/diagnoses(primary or secondary)? CHF   Does the patient have Home health ordered? No   Is there a DME ordered? No   Prep survey completed? Yes          NEWTON HAMILTON - Registered Nurse

## 2022-08-18 ENCOUNTER — READMISSION MANAGEMENT (OUTPATIENT)
Dept: CALL CENTER | Facility: HOSPITAL | Age: 67
End: 2022-08-18

## 2022-08-18 NOTE — OUTREACH NOTE
CHF Week 1 Survey    Flowsheet Row Responses   [facility] patient discharged from? [facility]   Does the patient have one of the following disease processes/diagnoses(primary or secondary)? CHF   CHF Week 1 attempt successful? Yes   Call start time 0855   Call end time 0902   Is patient permission given to speak with other caregiver? Yes   List who call center can speak with wife   Meds reviewed with patient/caregiver? Yes   Is the patient having any side effects they believe may be caused by any medication additions or changes? No   Does the patient have all medications ordered at discharge? Yes   Is the patient taking all medications as directed (includes completed medication regime)? Yes   Comments regarding appointments All follow up appointments have been scheduled.   Does the patient have a primary care provider?  Yes   Has the patient kept scheduled appointments due by today? N/A   Has home health visited the patient within 72 hours of discharge? N/A   Pulse Ox monitoring None   Psychosocial issues? No   Did the patient receive a copy of their discharge instructions? Yes   Nursing interventions Reviewed instructions with patient   What is the patient's perception of their health status since discharge? Improving   Nursing interventions Nurse provided patient education   Is the patient weighing daily? No   Does the patient have scales? Yes   Is the patient able to teach back Heart Failure diet management? Yes   Is the patient able to teach back Heart Failure Zones? Yes   Is the patient able to teach back signs and symptoms of worsening condition? (i.e. weight gain, shortness of air, etc.) Yes   Is the patient/caregiver able to teach back the hierarchy of who to call/visit for symptoms/problems? PCP, Specialist, Home health nurse, Urgent Care, ED, 911 Yes    CHF Week 1 call completed? Yes   Wrap up additional comments Spouse reports pt is improving slowly. Pt encouraged to weigh daily and reduce salt intake.  Follow up appointments are scheduled.          ROYER ASHBY - Registered Nurse

## 2022-08-26 ENCOUNTER — READMISSION MANAGEMENT (OUTPATIENT)
Dept: CALL CENTER | Facility: HOSPITAL | Age: 67
End: 2022-08-26

## 2022-08-26 NOTE — OUTREACH NOTE
CHF Week 2 Survey    Flowsheet Row Responses   [facility] facility patient discharged from? [facility]   Does the patient have one of the following disease processes/diagnoses(primary or secondary)? CHF   Week 2 attempt successful? No   Unsuccessful attempts Attempt 1          EDWARD Parker Registered Nurse

## 2022-08-29 ENCOUNTER — READMISSION MANAGEMENT (OUTPATIENT)
Dept: CALL CENTER | Facility: HOSPITAL | Age: 67
End: 2022-08-29

## 2022-08-29 NOTE — OUTREACH NOTE
CHF Week 2 Survey    Flowsheet Row Responses   [facility] patient discharged from? [facility]   Does the patient have one of the following disease processes/diagnoses(primary or secondary)? CHF   Week 2 attempt successful? Yes   Call start time 1444   Call end time 1448   Discharge diagnosis A/C CHF, COVID-19 in July 2022   Meds reviewed with patient/caregiver? Yes   Is the patient having any side effects they believe may be caused by any medication additions or changes? No   Does the patient have all medications ordered at discharge? Yes   Is the patient taking all medications as directed (includes completed medication regime)? Yes   Comments regarding appointments Has cardiology appt. 8/30/22   Does the patient have a primary care provider?  Yes   Does the patient have an appointment with their PCP within 7 days of discharge? Yes   Has the patient kept scheduled appointments due by today? Yes   Has home health visited the patient within 72 hours of discharge? N/A   Pulse Ox monitoring None   Psychosocial issues? No   Did the patient receive a copy of their discharge instructions? Yes   Nursing interventions Reviewed instructions with patient   What is the patient's perception of their health status since discharge? Improving   Nursing interventions Nurse provided patient education   Is the patient weighing daily? Yes   Does the patient have scales? Yes   Daily weight interventions Education provided on importance of daily weight   Is the patient able to teach back Heart Failure diet management? Yes   Is the patient able to teach back Heart Failure Zones? Yes   Is the patient able to teach back signs and symptoms of worsening condition? (i.e. weight gain, shortness of air, etc.) Yes   If the patient is a current smoker, are they able to teach back resources for cessation? Not a smoker   Is the patient/caregiver able to teach back the hierarchy of who to call/visit for symptoms/problems? PCP, Specialist, Home  health nurse, Urgent Care, ED, 911 Yes   CHF Week 2 call completed? Yes          JUVENAL BROWER - Registered Nurse

## 2022-09-07 ENCOUNTER — READMISSION MANAGEMENT (OUTPATIENT)
Dept: CALL CENTER | Facility: HOSPITAL | Age: 67
End: 2022-09-07

## 2022-09-07 ENCOUNTER — APPOINTMENT (OUTPATIENT)
Dept: GENERAL RADIOLOGY | Facility: HOSPITAL | Age: 67
End: 2022-09-07

## 2022-09-07 ENCOUNTER — HOSPITAL ENCOUNTER (EMERGENCY)
Facility: HOSPITAL | Age: 67
Discharge: HOME | End: 2022-09-07
Attending: EMERGENCY MEDICINE | Admitting: EMERGENCY MEDICINE

## 2022-09-07 ENCOUNTER — APPOINTMENT (OUTPATIENT)
Dept: CT IMAGING | Facility: HOSPITAL | Age: 67
End: 2022-09-07

## 2022-09-07 VITALS
OXYGEN SATURATION: 99 % | HEART RATE: 72 BPM | RESPIRATION RATE: 20 BRPM | HEIGHT: 66 IN | SYSTOLIC BLOOD PRESSURE: 114 MMHG | TEMPERATURE: 97.5 F | DIASTOLIC BLOOD PRESSURE: 71 MMHG | WEIGHT: 122.8 LBS | BODY MASS INDEX: 19.73 KG/M2

## 2022-09-07 DIAGNOSIS — R07.9 NONSPECIFIC CHEST PAIN: Primary | ICD-10-CM

## 2022-09-07 LAB
ALBUMIN SERPL-MCNC: 3.8 G/DL (ref 3.5–5.2)
ALBUMIN/GLOB SERPL: 0.7 G/DL
ALP SERPL-CCNC: 88 U/L (ref 39–117)
ALT SERPL W P-5'-P-CCNC: 8 U/L (ref 1–41)
ANION GAP SERPL CALCULATED.3IONS-SCNC: 10.7 MMOL/L (ref 5–15)
AST SERPL-CCNC: 13 U/L (ref 1–40)
BASOPHILS # BLD AUTO: 0.06 10*3/MM3 (ref 0–0.2)
BASOPHILS NFR BLD AUTO: 0.7 % (ref 0–1.5)
BILIRUB SERPL-MCNC: 0.4 MG/DL (ref 0–1.2)
BUN SERPL-MCNC: 22 MG/DL (ref 8–23)
BUN/CREAT SERPL: 18.3 (ref 7–25)
CALCIUM SPEC-SCNC: 9.6 MG/DL (ref 8.6–10.5)
CHLORIDE SERPL-SCNC: 98 MMOL/L (ref 98–107)
CO2 SERPL-SCNC: 29.3 MMOL/L (ref 22–29)
CREAT SERPL-MCNC: 1.2 MG/DL (ref 0.76–1.27)
D DIMER PPP FEU-MCNC: 0.6 MCGFEU/ML (ref 0–0.57)
DEPRECATED RDW RBC AUTO: 49.1 FL (ref 37–54)
EGFRCR SERPLBLD CKD-EPI 2021: 66.3 ML/MIN/1.73
EOSINOPHIL # BLD AUTO: 0.08 10*3/MM3 (ref 0–0.4)
EOSINOPHIL NFR BLD AUTO: 0.9 % (ref 0.3–6.2)
ERYTHROCYTE [DISTWIDTH] IN BLOOD BY AUTOMATED COUNT: 14.8 % (ref 12.3–15.4)
GLOBULIN UR ELPH-MCNC: 5.3 GM/DL
GLUCOSE SERPL-MCNC: 135 MG/DL (ref 65–99)
HCT VFR BLD AUTO: 37.6 % (ref 37.5–51)
HGB BLD-MCNC: 12.5 G/DL (ref 13–17.7)
HOLD SPECIMEN: NORMAL
IMM GRANULOCYTES # BLD AUTO: 0.03 10*3/MM3 (ref 0–0.05)
IMM GRANULOCYTES NFR BLD AUTO: 0.3 % (ref 0–0.5)
LIPASE SERPL-CCNC: 16 U/L (ref 13–60)
LYMPHOCYTES # BLD AUTO: 2.66 10*3/MM3 (ref 0.7–3.1)
LYMPHOCYTES NFR BLD AUTO: 28.9 % (ref 19.6–45.3)
MAGNESIUM SERPL-MCNC: 2.2 MG/DL (ref 1.6–2.4)
MCH RBC QN AUTO: 30.2 PG (ref 26.6–33)
MCHC RBC AUTO-ENTMCNC: 33.2 G/DL (ref 31.5–35.7)
MCV RBC AUTO: 90.8 FL (ref 79–97)
MONOCYTES # BLD AUTO: 0.76 10*3/MM3 (ref 0.1–0.9)
MONOCYTES NFR BLD AUTO: 8.3 % (ref 5–12)
NEUTROPHILS NFR BLD AUTO: 5.62 10*3/MM3 (ref 1.7–7)
NEUTROPHILS NFR BLD AUTO: 60.9 % (ref 42.7–76)
NRBC BLD AUTO-RTO: 0 /100 WBC (ref 0–0.2)
NT-PROBNP SERPL-MCNC: 2483 PG/ML (ref 0–900)
PLATELET # BLD AUTO: 446 10*3/MM3 (ref 140–450)
PMV BLD AUTO: 8.6 FL (ref 6–12)
POTASSIUM SERPL-SCNC: 4.8 MMOL/L (ref 3.5–5.2)
PROT SERPL-MCNC: 9.1 G/DL (ref 6–8.5)
RBC # BLD AUTO: 4.14 10*6/MM3 (ref 4.14–5.8)
SODIUM SERPL-SCNC: 138 MMOL/L (ref 136–145)
TROPONIN I SERPL-MCNC: 0.01 NG/ML (ref 0–0.08)
TROPONIN I SERPL-MCNC: 0.03 NG/ML (ref 0–0.08)
WBC NRBC COR # BLD: 9.21 10*3/MM3 (ref 3.4–10.8)
WHOLE BLOOD HOLD COAG: NORMAL
WHOLE BLOOD HOLD SPECIMEN: NORMAL

## 2022-09-07 PROCEDURE — 80053 COMPREHEN METABOLIC PANEL: CPT

## 2022-09-07 PROCEDURE — 71045 X-RAY EXAM CHEST 1 VIEW: CPT

## 2022-09-07 PROCEDURE — 71260 CT THORAX DX C+: CPT

## 2022-09-07 PROCEDURE — 93005 ELECTROCARDIOGRAM TRACING: CPT

## 2022-09-07 PROCEDURE — 84484 ASSAY OF TROPONIN QUANT: CPT

## 2022-09-07 PROCEDURE — 99283 EMERGENCY DEPT VISIT LOW MDM: CPT

## 2022-09-07 PROCEDURE — 85379 FIBRIN DEGRADATION QUANT: CPT | Performed by: EMERGENCY MEDICINE

## 2022-09-07 PROCEDURE — 0 IOPAMIDOL PER 1 ML: Performed by: EMERGENCY MEDICINE

## 2022-09-07 PROCEDURE — 83735 ASSAY OF MAGNESIUM: CPT

## 2022-09-07 PROCEDURE — 83690 ASSAY OF LIPASE: CPT

## 2022-09-07 PROCEDURE — 85025 COMPLETE CBC W/AUTO DIFF WBC: CPT

## 2022-09-07 PROCEDURE — 36415 COLL VENOUS BLD VENIPUNCTURE: CPT

## 2022-09-07 PROCEDURE — 83880 ASSAY OF NATRIURETIC PEPTIDE: CPT

## 2022-09-07 PROCEDURE — 93005 ELECTROCARDIOGRAM TRACING: CPT | Performed by: EMERGENCY MEDICINE

## 2022-09-07 RX ORDER — SODIUM CHLORIDE 0.9 % (FLUSH) 0.9 %
10 SYRINGE (ML) INJECTION AS NEEDED
Status: DISCONTINUED | OUTPATIENT
Start: 2022-09-07 | End: 2022-09-08 | Stop reason: HOSPADM

## 2022-09-07 RX ORDER — ASPIRIN 81 MG/1
324 TABLET, CHEWABLE ORAL ONCE
Status: DISCONTINUED | OUTPATIENT
Start: 2022-09-07 | End: 2022-09-08 | Stop reason: HOSPADM

## 2022-09-07 RX ADMIN — IOPAMIDOL 100 ML: 755 INJECTION, SOLUTION INTRAVENOUS at 19:50

## 2022-09-07 NOTE — ED PROVIDER NOTES
Time: 5:59 PM EDT  Arrived by: private car  Chief Complaint: chest pain  History provided by: Pt  History is limited by: N/A     History of Present Illness:  Patient is a 67 y.o. year old male who presents to the emergency department with chest pain.    Pt has had chest tightness and chest pain that started 3pm. He says the pain radiates down both arms and with numbness in his Left arm. He admits his chest tightness and pain worsens with inspiration. He says the chest pain has subsided since admittance to the ED. He denies having a fever, SOB, nausea, or vomiting. He admits some diarrhea over the last few days.    Pt has a history of CHF and HTN. Pt has had COVID recently.       History provided by:  Patient   used: No        Similar Symptoms Previously: No  Recently seen: Yes, 8/11/22 for tachycardia    Patient Care Team  Primary Care Provider: Shelly Mejia MD    Past Medical History:     Allergies   Allergen Reactions   • Penicillins Hives     Past Medical History:   Diagnosis Date   • Arthritis    • CHF (congestive heart failure) (HCC)    • Hypertension      Past Surgical History:   Procedure Laterality Date   • COLONOSCOPY     • COLONOSCOPY N/A 6/27/2022    Procedure: COLONOSCOPY WITH POLYPECTOMIES;  Surgeon: Min Campbell MD;  Location: [facility] ENDOSCOPY;  Service: Gastroenterology;  Laterality: N/A;  COLON POLYPS, DIVERTICULOSIS   • ENDOSCOPY N/A 03/29/2022    Procedure: ESOPHAGOGASTRODUODENOSCOPY WITH BIOPSIES;  Surgeon: Min Campbell MD;  Location: [facility] ENDOSCOPY;  Service: Gastroenterology;  Laterality: N/A;  HIATAL HERNIA   • HEMORRHOIDECTOMY       History reviewed. No pertinent family history.    Home Medications:  Prior to Admission medications    Medication Sig Start Date End Date Taking? Authorizing Provider   aspirin 81 MG EC tablet Take 81 mg by mouth Daily.    Provider, MD Kat   Etanercept (Enbrel Mini) 50 MG/ML solution cartridge Inject 50 mg under  the skin into the appropriate area as directed Every 7 (Seven) Days. Mon    Kat Aguilar MD   folic acid (FOLVITE) 1 MG tablet Take 1 mg by mouth Daily. 12/20/21   Kat Aguilar MD   furosemide (LASIX) 40 MG tablet Take 1 tablet by mouth 2 (Two) Times a Day for 90 days. 8/14/22 11/12/22  Sunshine Joy MD   linaclotide (LINZESS) 145 MCG capsule capsule Take 145 mcg by mouth Every Morning Before Breakfast.    Kat Aguilar MD   methotrexate 2.5 MG tablet Take 12.5 mg by mouth 1 (One) Time Per Week. Friday (5 tabs)    Kat Aguilar MD   metoprolol succinate XL (TOPROL-XL) 25 MG 24 hr tablet Take 25 mg by mouth Daily.    Kat Aguilar MD   oxyCODONE-acetaminophen (PERCOCET) 7.5-325 MG per tablet Take 1 tablet by mouth 2 (Two) Times a Day As Needed. 2/14/22   Kat Aguilar MD   potassium chloride 10 MEQ CR tablet Take 1 tablet by mouth Daily for 30 days. 8/14/22 9/13/22  Sunshine Joy MD   Testosterone Enanthate 200 MG/ML solution Inject 200 mg into the appropriate muscle as directed by prescriber Every 14 (Fourteen) Days. Wed    Kat Aguilar MD        Social History:   Social History     Tobacco Use   • Smoking status: Never Smoker   • Smokeless tobacco: Never Used   Substance Use Topics   • Alcohol use: Not Currently   • Drug use: Never     Recent travel: no     Review of Systems:  Review of Systems   Constitutional: Negative for chills and fever.   HENT: Negative for congestion, rhinorrhea and sore throat.    Eyes: Negative for photophobia.   Respiratory: Positive for chest tightness. Negative for apnea, cough and shortness of breath.    Cardiovascular: Positive for chest pain. Negative for palpitations.   Gastrointestinal: Positive for diarrhea. Negative for abdominal pain, nausea and vomiting.   Endocrine: Negative.    Genitourinary: Negative for difficulty urinating and dysuria.   Musculoskeletal: Negative for back pain, joint swelling and myalgias.  "  Skin: Negative for color change and wound.   Allergic/Immunologic: Negative.    Neurological: Positive for numbness. Negative for seizures and headaches.   Psychiatric/Behavioral: Negative.    All other systems reviewed and are negative.       Physical Exam:  /63   Pulse 76   Temp 97.5 °F (36.4 °C) (Oral)   Resp 20   Ht 167.6 cm (66\")   Wt 55.7 kg (122 lb 12.7 oz)   SpO2 100%   BMI 19.82 kg/m²     Physical Exam  Vitals and nursing note reviewed.   Constitutional:       General: He is awake.      Appearance: Normal appearance.   HENT:      Head: Normocephalic and atraumatic.      Nose: Nose normal.      Mouth/Throat:      Mouth: Mucous membranes are moist.   Eyes:      Extraocular Movements: Extraocular movements intact.      Pupils: Pupils are equal, round, and reactive to light.   Cardiovascular:      Rate and Rhythm: Normal rate. Rhythm irregular.      Heart sounds: Normal heart sounds.      Comments: Irregular rhythm with bigeminy  Pulmonary:      Effort: Pulmonary effort is normal. No respiratory distress.      Breath sounds: Normal breath sounds. No wheezing, rhonchi or rales.   Chest:      Chest wall: Tenderness present.   Breasts:      Left: Tenderness present.        Comments: Left sided chest pain reproducible upon palpation to Left chest and reproducible upon movement of Left upper extremity  Abdominal:      General: Bowel sounds are normal.      Palpations: Abdomen is soft.      Tenderness: There is no abdominal tenderness. There is no guarding or rebound.      Comments: No rigidity   Musculoskeletal:         General: No tenderness. Normal range of motion.      Cervical back: Normal range of motion and neck supple.   Skin:     General: Skin is warm and dry.      Coloration: Skin is not jaundiced.   Neurological:      General: No focal deficit present.      Mental Status: He is alert and oriented to person, place, and time. Mental status is at baseline.      Sensory: Sensation is intact.     "  Motor: Motor function is intact.      Coordination: Coordination is intact.   Psychiatric:         Attention and Perception: Attention and perception normal.         Mood and Affect: Mood and affect normal.         Speech: Speech normal.         Behavior: Behavior normal.         Judgment: Judgment normal.                Medications in the Emergency Department:  Medications   sodium chloride 0.9 % flush 10 mL (has no administration in time range)   aspirin chewable tablet 324 mg (324 mg Oral Not Given 9/7/22 1826)   iopamidol (ISOVUE-370) 76 % injection 100 mL (100 mL Intravenous Given 9/7/22 1950)        Labs  Lab Results (last 24 hours)     Procedure Component Value Units Date/Time    POC Troponin I with Hold Tube [[id number]] Collected: 09/07/22 1631    Specimen: Blood Updated: 09/07/22 1726    Narrative:      The following orders were created for panel order POC Troponin I with Hold Tube.  Procedure                               Abnormality         Status                     ---------                               -----------         ------                     POC Troponin I[[id number]]                                                              HOLD Troponin-I Tube[[id number]]                             Final result                 Please view results for these tests on the individual orders.    CBC & Differential [[id number]]  (Abnormal) Collected: 09/07/22 1631    Specimen: Blood Updated: 09/07/22 1644    Narrative:      The following orders were created for panel order CBC & Differential.  Procedure                               Abnormality         Status                     ---------                               -----------         ------                     CBC Auto Differential[[id number]]        Abnormal            Final result                 Please view results for these tests on the individual orders.    Comprehensive Metabolic Panel [[id number]]  (Abnormal) Collected: 09/07/22 1631    Specimen: Blood  Updated: 09/07/22 1703     Glucose 135 mg/dL      BUN 22 mg/dL      Creatinine 1.20 mg/dL      Sodium 138 mmol/L      Potassium 4.8 mmol/L      Chloride 98 mmol/L      CO2 29.3 mmol/L      Calcium 9.6 mg/dL      Total Protein 9.1 g/dL      Albumin 3.80 g/dL      ALT (SGPT) 8 U/L      AST (SGOT) 13 U/L      Alkaline Phosphatase 88 U/L      Total Bilirubin 0.4 mg/dL      Globulin 5.3 gm/dL      A/G Ratio 0.7 g/dL      BUN/Creatinine Ratio 18.3     Anion Gap 10.7 mmol/L      eGFR 66.3 mL/min/1.73      Comment: National Kidney Foundation and American Society of Nephrology (ASN) Task Force recommended calculation based on the Chronic Kidney Disease Epidemiology Collaboration (CKD-EPI) equation refit without adjustment for race.       Narrative:      GFR Normal >60  Chronic Kidney Disease <60  Kidney Failure <15      Lipase [[id number]]  (Normal) Collected: 09/07/22 1631    Specimen: Blood Updated: 09/07/22 1703     Lipase 16 U/L     BNP [[id number]]  (Abnormal) Collected: 09/07/22 1631    Specimen: Blood Updated: 09/07/22 1701     proBNP 2,483.0 pg/mL     Narrative:      Among patients with dyspnea, NT-proBNP is highly sensitive for the detection of acute congestive heart failure. In addition NT-proBNP of <300 pg/ml effectively rules out acute congestive heart failure with 99% negative predictive value.    Results may be falsely decreased if patient taking Biotin.      Magnesium [[id number]]  (Normal) Collected: 09/07/22 1631    Specimen: Blood Updated: 09/07/22 1703     Magnesium 2.2 mg/dL     CBC Auto Differential [[id number]]  (Abnormal) Collected: 09/07/22 1631    Specimen: Blood Updated: 09/07/22 1644     WBC 9.21 10*3/mm3      RBC 4.14 10*6/mm3      Hemoglobin 12.5 g/dL      Hematocrit 37.6 %      MCV 90.8 fL      MCH 30.2 pg      MCHC 33.2 g/dL      RDW 14.8 %      RDW-SD 49.1 fl      MPV 8.6 fL      Platelets 446 10*3/mm3      Neutrophil % 60.9 %      Lymphocyte % 28.9 %      Monocyte % 8.3 %      Eosinophil % 0.9 %       Basophil % 0.7 %      Immature Grans % 0.3 %      Neutrophils, Absolute 5.62 10*3/mm3      Lymphocytes, Absolute 2.66 10*3/mm3      Monocytes, Absolute 0.76 10*3/mm3      Eosinophils, Absolute 0.08 10*3/mm3      Basophils, Absolute 0.06 10*3/mm3      Immature Grans, Absolute 0.03 10*3/mm3      nRBC 0.0 /100 WBC     POC Troponin I [[id number]]  (Normal) Collected: 09/07/22 1631    Specimen: Blood Updated: 09/07/22 1741     Troponin I 0.01 ng/mL      Comment: Serial Number: [id number]:  [id number]       D-dimer, Quantitative [[id number]]  (Abnormal) Collected: 09/07/22 1631    Specimen: Blood Updated: 09/07/22 1824     D-Dimer, Quantitative 0.60 MCGFEU/mL     Narrative:      The Stago D-Dimer test used in conjunction with a clinical pretest probability (PTP) assessment model, has been approved by the FDA to rule out the presence of venous thromboembolism (VTE) in outpatients suspected of deep venous thrombosis (DVT) or pulmonary embolism (PE). The cut-off for negative predictive value is <0.50 MCGFEU/mL.    POC Troponin I [[id number]]  (Normal) Collected: 09/07/22 1841    Specimen: Blood Updated: 09/07/22 1854     Troponin I 0.03 ng/mL      Comment: Serial Number: [id number]:  [id number]              Imaging:  CT Chest With Contrast Diagnostic    Result Date: 9/7/2022  PROCEDURE: CT CHEST W CONTRAST DIAGNOSTIC  COMPARISON:  [facility], CT, CHEST W/ CONTRAST, 10/22/2020, 1:12.  [facility], CT, CT CHEST WO CONTRAST DIAGNOSTIC, 3/26/2022, 21:37. INDICATIONS: PE protocol.  Elevated dimer, pleuritic chest pain after COVID  PROTOCOL:   PE imaging protocol performed    RADIATION:   DLP: 278.3mGy*cm   Automated exposure control was utilized to minimize radiation dose. CONTRAST: 100cc Isovue 370 I.V.  TECHNIQUE: Axial images of the chest with intravenous contrast.  FINDINGS: Cardiomegaly is present.  There is a small right pleural effusion and tiny left pleural effusion.  No evidence of pericardial  effusion.  No pulmonary emboli are identified.  There is bronchiectasis in the lower lobes.  There is mild airspace consolidation in the lower lung fields.  Mild degenerative changes are present in the thoracic spine.  Images of the upper abdomen are unremarkable.  IMPRESSION:  1. No pulmonary emboli are identified.  2. Cardiomegaly.  Small right pleural effusion.  Tiny left pleural effusion.  3. Mild airspace consolidation in lower lobes may be secondary to pulmonary edema or pneumonia.   JOHN BATISTA MD       Electronically Signed and Approved By: JOHN BATISTA MD on 9/07/2022 at 20:13             XR Chest 1 View    Result Date: 9/7/2022  PROCEDURE: XR CHEST 1 VW  COMPARISON: [facility], CR, XR CHEST 1 VW, 4/11/2022, 12:37.  [facility], CR, XR CHEST 1 VW, 5/30/2022, 12:28.  [facility], CR, XR CHEST 1 VW, 8/11/2022, 5:02.  INDICATIONS: Chest Pain Triage Protocol  FINDINGS:  Single-view chest demonstrates prominence the cardiac silhouette.  No large pleural effusion is suspected.  There may be a few granulomas present.  Bilateral opacities appear slightly improved compared to previous exam.  No other significant changes.  Few granulomas likely present.        1. The heart is prominent in size. 2. Mild bilateral opacities again seen appear improved compared with 8/11/2022.  This may reflect improved pneumonia and/or edema.  There are superimposed atelectasis and/or fibrosis present.       CLAUDY ERNANDEZ MD       Electronically Signed and Approved By: CLAUDY ERNANDEZ MD on 9/07/2022 at 16:55               Procedures:  Procedures    Progress  ED Course as of 09/07/22 2213   Wed Sep 07, 2022   1758 EKG interpretation: Flutter/left axis deviation, nonspecific ST segment changes with no acute ischemia.  Ventricular bigeminy, heart rate 79. [RP]   2207 Patient with known A. fib charted on last admission to our facility. [RP]      ED Course User Index  [RP] Enio  MD Myron           HEART Score: 4                  Medical Decision Making:  MDM  Number of Diagnoses or Management Options  Nonspecific chest pain: new and requires workup     Amount and/or Complexity of Data Reviewed  Clinical lab tests: reviewed  Tests in the radiology section of CPT®: reviewed  Tests in the medicine section of CPT®: reviewed  Decide to obtain previous medical records or to obtain history from someone other than the patient: yes  Independent visualization of images, tracings, or specimens: yes    Risk of Complications, Morbidity, and/or Mortality  Presenting problems: moderate  Management options: low    Patient Progress  Patient progress: improved (22:09 EDT Updated patient on results of tests and plan for discharge. Patient expressed understanding and agreement. All questions answered at this time.)       Final diagnoses:   Nonspecific chest pain        Disposition:  ED Disposition     ED Disposition   Discharge    Condition   Stable    Comment   --             This medical record created using voice recognition software.      Documentation assistance provided by GUILLERMO BACH acting as scribe for Myron Steen MD. Information recorded by the scribe was done at my direction and has been verified and validated by me.        Guillermo Bach  09/07/22 1816       Guillermo Bach  09/07/22 2209       Myron Steen MD  09/07/22 7058

## 2022-09-07 NOTE — OUTREACH NOTE
"CHF Week 3 Survey    Flowsheet Row Responses   [facility] patient discharged from? [facility]   Does the patient have one of the following disease processes/diagnoses(primary or secondary)? CHF   Week 3 attempt successful? Yes   Call start time 1431   Call end time 1433   Discharge diagnosis A/C CHF, COVID-19 in July 2022   Meds reviewed with patient/caregiver? Yes   Is the patient taking all medications as directed (includes completed medication regime)? Yes   Has the patient kept scheduled appointments due by today? Yes   Pulse Ox monitoring None   What is the patient's perception of their health status since discharge? Improving   Is the patient able to teach back Heart Failure Zones? Yes   CHF Nursing Interventions Education provided on various zones   CHF Zone this Call Green Zone   Green Zone Patient reports doing well, No new or worsening shortness of breath, Weight check stable, Physical activity level is normal for you   Green Zone Interventions Daily weight check, Meds as directed, Low sodium diet   CHF Week 3 call completed? Yes   Revoked No further contact(revokes)-requires comment   Graduated/Revoked comments Wife stated, \"you all usually call once. Is this serious?\" RN provided education. Pt was not home at time of call.    Call end time 1433          ANISA CHAMPAGNE - Registered Nurse  "

## 2022-09-08 LAB
QT INTERVAL: 356 MS
QT INTERVAL: 438 MS

## 2022-09-08 NOTE — DISCHARGE INSTRUCTIONS
Return the emergency department as needed for worsening of symptoms.  Follow-up with your cardiologist tomorrow as we discussed.

## 2022-09-10 ENCOUNTER — APPOINTMENT (OUTPATIENT)
Dept: GENERAL RADIOLOGY | Facility: HOSPITAL | Age: 67
End: 2022-09-10

## 2022-09-10 ENCOUNTER — HOSPITAL ENCOUNTER (OUTPATIENT)
Facility: HOSPITAL | Age: 67
Setting detail: OBSERVATION
End: 2022-09-12
Attending: EMERGENCY MEDICINE | Admitting: INTERNAL MEDICINE

## 2022-09-10 ENCOUNTER — APPOINTMENT (OUTPATIENT)
Dept: CT IMAGING | Facility: HOSPITAL | Age: 67
End: 2022-09-10

## 2022-09-10 DIAGNOSIS — Z78.9 DECREASED ACTIVITIES OF DAILY LIVING (ADL): ICD-10-CM

## 2022-09-10 DIAGNOSIS — R47.01 APHASIA: ICD-10-CM

## 2022-09-10 DIAGNOSIS — R47.1 DYSARTHRIA: Primary | ICD-10-CM

## 2022-09-10 DIAGNOSIS — R26.2 DIFFICULTY IN WALKING: ICD-10-CM

## 2022-09-10 DIAGNOSIS — R13.12 DYSPHAGIA, OROPHARYNGEAL: ICD-10-CM

## 2022-09-10 PROBLEM — R13.10 DYSPHAGIA: Status: ACTIVE | Noted: 2022-09-10

## 2022-09-10 LAB
ALBUMIN SERPL-MCNC: 3.8 G/DL (ref 3.5–5.2)
ALBUMIN/GLOB SERPL: 0.7 G/DL
ALP SERPL-CCNC: 92 U/L (ref 39–117)
ALT SERPL W P-5'-P-CCNC: 7 U/L (ref 1–41)
ANION GAP SERPL CALCULATED.3IONS-SCNC: 13.2 MMOL/L (ref 5–15)
AST SERPL-CCNC: 13 U/L (ref 1–40)
BASOPHILS # BLD AUTO: 0.03 10*3/MM3 (ref 0–0.2)
BASOPHILS NFR BLD AUTO: 0.3 % (ref 0–1.5)
BILIRUB SERPL-MCNC: 0.3 MG/DL (ref 0–1.2)
BUN SERPL-MCNC: 22 MG/DL (ref 8–23)
BUN/CREAT SERPL: 19.8 (ref 7–25)
CALCIUM SPEC-SCNC: 9.5 MG/DL (ref 8.6–10.5)
CHLORIDE SERPL-SCNC: 96 MMOL/L (ref 98–107)
CO2 SERPL-SCNC: 25.8 MMOL/L (ref 22–29)
CREAT SERPL-MCNC: 1.11 MG/DL (ref 0.76–1.27)
DEPRECATED RDW RBC AUTO: 49.6 FL (ref 37–54)
EGFRCR SERPLBLD CKD-EPI 2021: 72.8 ML/MIN/1.73
EOSINOPHIL # BLD AUTO: 0.05 10*3/MM3 (ref 0–0.4)
EOSINOPHIL NFR BLD AUTO: 0.5 % (ref 0.3–6.2)
ERYTHROCYTE [DISTWIDTH] IN BLOOD BY AUTOMATED COUNT: 14.6 % (ref 12.3–15.4)
GLOBULIN UR ELPH-MCNC: 5.8 GM/DL
GLUCOSE SERPL-MCNC: 96 MG/DL (ref 65–99)
HCT VFR BLD AUTO: 38.2 % (ref 37.5–51)
HGB BLD-MCNC: 12.3 G/DL (ref 13–17.7)
HOLD SPECIMEN: NORMAL
IMM GRANULOCYTES # BLD AUTO: 0.03 10*3/MM3 (ref 0–0.05)
IMM GRANULOCYTES NFR BLD AUTO: 0.3 % (ref 0–0.5)
LIPASE SERPL-CCNC: 15 U/L (ref 13–60)
LYMPHOCYTES # BLD AUTO: 2.05 10*3/MM3 (ref 0.7–3.1)
LYMPHOCYTES NFR BLD AUTO: 19.8 % (ref 19.6–45.3)
MAGNESIUM SERPL-MCNC: 2.1 MG/DL (ref 1.6–2.4)
MCH RBC QN AUTO: 29.9 PG (ref 26.6–33)
MCHC RBC AUTO-ENTMCNC: 32.2 G/DL (ref 31.5–35.7)
MCV RBC AUTO: 92.9 FL (ref 79–97)
MONOCYTES # BLD AUTO: 0.79 10*3/MM3 (ref 0.1–0.9)
MONOCYTES NFR BLD AUTO: 7.6 % (ref 5–12)
NEUTROPHILS NFR BLD AUTO: 7.41 10*3/MM3 (ref 1.7–7)
NEUTROPHILS NFR BLD AUTO: 71.5 % (ref 42.7–76)
NRBC BLD AUTO-RTO: 0 /100 WBC (ref 0–0.2)
NT-PROBNP SERPL-MCNC: 2006 PG/ML (ref 0–900)
PLATELET # BLD AUTO: 433 10*3/MM3 (ref 140–450)
PMV BLD AUTO: 9.2 FL (ref 6–12)
POTASSIUM SERPL-SCNC: 4.8 MMOL/L (ref 3.5–5.2)
PROT SERPL-MCNC: 9.6 G/DL (ref 6–8.5)
RBC # BLD AUTO: 4.11 10*6/MM3 (ref 4.14–5.8)
SODIUM SERPL-SCNC: 135 MMOL/L (ref 136–145)
TROPONIN I SERPL-MCNC: 0 NG/ML (ref 0–0.08)
TROPONIN I SERPL-MCNC: 0.02 NG/ML (ref 0–0.08)
WBC NRBC COR # BLD: 10.36 10*3/MM3 (ref 3.4–10.8)
WHOLE BLOOD HOLD SPECIMEN: NORMAL

## 2022-09-10 PROCEDURE — 93005 ELECTROCARDIOGRAM TRACING: CPT

## 2022-09-10 PROCEDURE — 93005 ELECTROCARDIOGRAM TRACING: CPT | Performed by: EMERGENCY MEDICINE

## 2022-09-10 PROCEDURE — G0378 HOSPITAL OBSERVATION PER HR: HCPCS

## 2022-09-10 PROCEDURE — 80053 COMPREHEN METABOLIC PANEL: CPT | Performed by: EMERGENCY MEDICINE

## 2022-09-10 PROCEDURE — 83690 ASSAY OF LIPASE: CPT | Performed by: EMERGENCY MEDICINE

## 2022-09-10 PROCEDURE — 85025 COMPLETE CBC W/AUTO DIFF WBC: CPT | Performed by: EMERGENCY MEDICINE

## 2022-09-10 PROCEDURE — 70450 CT HEAD/BRAIN W/O DYE: CPT

## 2022-09-10 PROCEDURE — 25010000002 FUROSEMIDE PER 20 MG: Performed by: INTERNAL MEDICINE

## 2022-09-10 PROCEDURE — 99285 EMERGENCY DEPT VISIT HI MDM: CPT

## 2022-09-10 PROCEDURE — 99204 OFFICE O/P NEW MOD 45 MIN: CPT | Performed by: PSYCHIATRY & NEUROLOGY

## 2022-09-10 PROCEDURE — 99220 PR INITIAL OBSERVATION CARE/DAY 70 MINUTES: CPT | Performed by: INTERNAL MEDICINE

## 2022-09-10 PROCEDURE — 84484 ASSAY OF TROPONIN QUANT: CPT

## 2022-09-10 PROCEDURE — 71045 X-RAY EXAM CHEST 1 VIEW: CPT

## 2022-09-10 PROCEDURE — 36415 COLL VENOUS BLD VENIPUNCTURE: CPT

## 2022-09-10 PROCEDURE — 83880 ASSAY OF NATRIURETIC PEPTIDE: CPT | Performed by: EMERGENCY MEDICINE

## 2022-09-10 PROCEDURE — 83735 ASSAY OF MAGNESIUM: CPT | Performed by: EMERGENCY MEDICINE

## 2022-09-10 PROCEDURE — 96374 THER/PROPH/DIAG INJ IV PUSH: CPT

## 2022-09-10 RX ORDER — ENOXAPARIN SODIUM 100 MG/ML
40 INJECTION SUBCUTANEOUS DAILY
Status: CANCELLED | OUTPATIENT
Start: 2022-09-10

## 2022-09-10 RX ORDER — METOPROLOL SUCCINATE 25 MG/1
25 TABLET, EXTENDED RELEASE ORAL DAILY
Status: DISCONTINUED | OUTPATIENT
Start: 2022-09-10 | End: 2022-09-12 | Stop reason: HOSPADM

## 2022-09-10 RX ORDER — FAMOTIDINE 20 MG/1
20 TABLET, FILM COATED ORAL NIGHTLY
Status: DISCONTINUED | OUTPATIENT
Start: 2022-09-10 | End: 2022-09-11

## 2022-09-10 RX ORDER — SODIUM CHLORIDE 0.9 % (FLUSH) 0.9 %
10 SYRINGE (ML) INJECTION AS NEEDED
Status: DISCONTINUED | OUTPATIENT
Start: 2022-09-10 | End: 2022-09-12 | Stop reason: HOSPADM

## 2022-09-10 RX ORDER — BISACODYL 10 MG
10 SUPPOSITORY, RECTAL RECTAL DAILY PRN
Status: DISCONTINUED | OUTPATIENT
Start: 2022-09-10 | End: 2022-09-12 | Stop reason: HOSPADM

## 2022-09-10 RX ORDER — FOLIC ACID 1 MG/1
1 TABLET ORAL DAILY
Status: DISCONTINUED | OUTPATIENT
Start: 2022-09-10 | End: 2022-09-12 | Stop reason: HOSPADM

## 2022-09-10 RX ORDER — CLOPIDOGREL BISULFATE 75 MG/1
75 TABLET ORAL DAILY
Status: CANCELLED | OUTPATIENT
Start: 2022-09-10

## 2022-09-10 RX ORDER — SODIUM CHLORIDE 0.9 % (FLUSH) 0.9 %
10 SYRINGE (ML) INJECTION EVERY 12 HOURS SCHEDULED
Status: DISCONTINUED | OUTPATIENT
Start: 2022-09-10 | End: 2022-09-12 | Stop reason: HOSPADM

## 2022-09-10 RX ORDER — ATORVASTATIN CALCIUM 40 MG/1
80 TABLET, FILM COATED ORAL NIGHTLY
Status: DISCONTINUED | OUTPATIENT
Start: 2022-09-10 | End: 2022-09-12 | Stop reason: HOSPADM

## 2022-09-10 RX ORDER — FUROSEMIDE 10 MG/ML
20 INJECTION INTRAMUSCULAR; INTRAVENOUS 2 TIMES DAILY
Status: DISCONTINUED | OUTPATIENT
Start: 2022-09-10 | End: 2022-09-11

## 2022-09-10 RX ORDER — ASPIRIN 81 MG/1
81 TABLET ORAL DAILY
Status: CANCELLED | OUTPATIENT
Start: 2022-09-10

## 2022-09-10 RX ORDER — SODIUM CHLORIDE 0.9 % (FLUSH) 0.9 %
10 SYRINGE (ML) INJECTION AS NEEDED
Status: DISCONTINUED | OUTPATIENT
Start: 2022-09-10 | End: 2022-09-10

## 2022-09-10 RX ORDER — SODIUM CHLORIDE 9 MG/ML
40 INJECTION, SOLUTION INTRAVENOUS AS NEEDED
Status: DISCONTINUED | OUTPATIENT
Start: 2022-09-10 | End: 2022-09-12 | Stop reason: HOSPADM

## 2022-09-10 RX ORDER — ONDANSETRON 2 MG/ML
4 INJECTION INTRAMUSCULAR; INTRAVENOUS EVERY 6 HOURS PRN
Status: DISCONTINUED | OUTPATIENT
Start: 2022-09-10 | End: 2022-09-12 | Stop reason: HOSPADM

## 2022-09-10 RX ORDER — DOCUSATE SODIUM 100 MG/1
100 CAPSULE, LIQUID FILLED ORAL DAILY
Status: DISCONTINUED | OUTPATIENT
Start: 2022-09-10 | End: 2022-09-12 | Stop reason: HOSPADM

## 2022-09-10 RX ORDER — ASPIRIN 81 MG/1
324 TABLET, CHEWABLE ORAL ONCE
Status: DISCONTINUED | OUTPATIENT
Start: 2022-09-10 | End: 2022-09-10

## 2022-09-10 RX ORDER — HYDROCODONE BITARTRATE AND ACETAMINOPHEN 5; 325 MG/1; MG/1
1 TABLET ORAL EVERY 4 HOURS PRN
Status: DISCONTINUED | OUTPATIENT
Start: 2022-09-10 | End: 2022-09-12 | Stop reason: HOSPADM

## 2022-09-10 RX ORDER — ALUMINA, MAGNESIA, AND SIMETHICONE 2400; 2400; 240 MG/30ML; MG/30ML; MG/30ML
7.5 SUSPENSION ORAL EVERY 4 HOURS PRN
Status: DISCONTINUED | OUTPATIENT
Start: 2022-09-10 | End: 2022-09-12 | Stop reason: HOSPADM

## 2022-09-10 RX ADMIN — DOCUSATE SODIUM 100 MG: 100 CAPSULE, LIQUID FILLED ORAL at 18:03

## 2022-09-10 RX ADMIN — Medication 10 ML: at 21:06

## 2022-09-10 RX ADMIN — FUROSEMIDE 20 MG: 10 INJECTION, SOLUTION INTRAMUSCULAR; INTRAVENOUS at 21:05

## 2022-09-10 RX ADMIN — METOPROLOL SUCCINATE 25 MG: 25 TABLET, EXTENDED RELEASE ORAL at 18:03

## 2022-09-10 RX ADMIN — HYDROCODONE BITARTRATE AND ACETAMINOPHEN 1 TABLET: 5; 325 TABLET ORAL at 18:03

## 2022-09-10 RX ADMIN — FAMOTIDINE 20 MG: 20 TABLET ORAL at 21:05

## 2022-09-10 RX ADMIN — APIXABAN 5 MG: 5 TABLET, FILM COATED ORAL at 21:05

## 2022-09-10 RX ADMIN — ATORVASTATIN CALCIUM 80 MG: 40 TABLET, FILM COATED ORAL at 21:05

## 2022-09-10 NOTE — CONSULTS
[facility]   Neurology Consult Note    Patient Name: Hai Gold  : 1955  MRN: [id number]  Primary Care Physician:  Shelly Mejia MD  Referring Physician: No ref. provider found  Date of admission: 9/10/2022    Subjective   Subjective     Reason for Consult/ Chief Complaint: Stuttering, myoclonic jerks, headache, swallowing difficulties, lump in the throat, numbness in the left leg, pain in the arms.    HPI:  Hai Gold is a 67 y.o. male he states that he woke up this morning and he states that his speech was slow and he had difficulty in getting his words out and he was stuttering.  He also felt that he had a headache and felt like his body was jerking.  He states that this is the first time this is Jeric in the last time he jerk was 2 months ago.  He does not usually have the symptoms.  He has been admitted several times for chest discomfort, nonspecific symptoms and recently had a CT scan of the chest this month which is unremarkable.  He is taking Enbrel as well as methotrexate for severe rheumatoid arthritis.  He has a history of congestive heart failure and is also being followed by cardiology.  It was noted in previous notes that he was in atrial fibrillation and according to notes he has been started on Eliquis recently however I do not see this in his home medications.  He was admitted last month for dyspnea and congestive heart failure.    He states that he is independent with activities of daily living.  He lives with his wife.  He drove yesterday.  He states that his been taking 1-2 pain medications a day usually in the evenings.  He has not taken more.    CT scan of the brain is negative.  EKG is atrial fibrillation.  Magnesium and calcium are normal.    Personal History     Past Medical History:   Diagnosis Date   • Arthritis    • CHF (congestive heart failure) (HCC)    • Hypertension        Past Surgical History:   Procedure Laterality Date   • COLONOSCOPY     •  COLONOSCOPY N/A 6/27/2022    Procedure: COLONOSCOPY WITH POLYPECTOMIES;  Surgeon: Min Campbell MD;  Location: [facility] ENDOSCOPY;  Service: Gastroenterology;  Laterality: N/A;  COLON POLYPS, DIVERTICULOSIS   • ENDOSCOPY N/A 03/29/2022    Procedure: ESOPHAGOGASTRODUODENOSCOPY WITH BIOPSIES;  Surgeon: Min Campbell MD;  Location: [facility] ENDOSCOPY;  Service: Gastroenterology;  Laterality: N/A;  HIATAL HERNIA   • HEMORRHOIDECTOMY         Family History: family history is not on file. Otherwise pertinent FHx was reviewed and not pertinent to current issue.    Social History:  reports that he has never smoked. He has never used smokeless tobacco. He reports previous alcohol use. He reports that he does not use drugs.    Home Medications:  Etanercept, Testosterone Enanthate, aspirin, folic acid, furosemide, linaclotide, methotrexate, metoprolol succinate XL, oxyCODONE-acetaminophen, and potassium chloride    Allergies:  Allergies   Allergen Reactions   • Penicillins Hives       Objective    Objective     Vitals:   Temp:  [98.3 °F (36.8 °C)-98.9 °F (37.2 °C)] 98.9 °F (37.2 °C)  Heart Rate:  [35-94] 75  Resp:  [18-22] 18  BP: (108-164)/(46-83) 131/79    Physical Exam: He is alert, fluent, stuttering, follows commands well.  He is able to give me the history as noted above.  He has intermittent myoclonic jerks.  Visual fields full confrontation, EOMs are full directions of gaze, facial strength is full, soft palate elevation and tongue normal.  There is giveaway weakness of the upper or lower extremities.  He is able to get up from a lying down to sitting position into a standing position.  He has some myoclonic jerks which his knees tend to buckle.  Reflexes are absent in the biceps, triceps, patellar's and ankles.  Cerebellar testing is intact.  Hear irregular rhythm.      Result Review    Result Review:  I have personally reviewed the results from the time of this admission to 9/10/2022 18:06 EDT and agree  with these findings:  [x]  Laboratory  []  Microbiology  [x]  Radiology  [x]  EKG/Telemetry   [x]  Cardiology/Vascular   []  Pathology  [x]  Old records  []  Other:      Assessment & Plan   Assessment / Plan   Active Hospital Problems:  Active Hospital Problems    Diagnosis    • Expressive aphasia    • Dysphagia    • Dysarthria          Plan: I discussed with him that his myoclonic jerks as well as his stuttering may be secondary to metabolic encephalopathy which can be seen in hypoxia, use of narcotic medications.  I discussed with him that we will get an MRI of the brain.  In rare cases demyelinating disease can be seen with use of Enbrel and methotrexate.  I do not think he has a stroke.  He is in atrial fibrillation and at this time is not on Eliquis.    Total time spent with the patient and coordinating patient care was 55 minutes.    electronically signed by Douglas Barkley MD, 09/10/22, 6:06 PM EDT.

## 2022-09-10 NOTE — H&P
[facility]  Date: 9[facility]10/2022   Patient Name: Hai Gold  : 1955  MRN: [id number]  Primary Care Physician:  Shelly Mejia MD  Date of admission: 9/10/2022    Subjective   Subjective     Chief Complaint: Speech difficulty and difficulty swallowing started today    HPI:    Hai Gold is a 67 y.o. male with past medical history of A. fib recently started on Eliquis by Dr. Matheus Urrutia, systolic CHF  , COVID-19 infection , hypertension, rheumatoid arthritis on Enbrel and methotrexate by Dr. zavaleta and history of difficulty swallowing as well as with Dr. Campbell in March and recommended back placement refused by patient and the family.  Patient woke up with slurred speech and difficulty swallowing especially water felt like lump in his throat.  This difficulty is different than his baseline as per family at bedside.  Difficulty swallowing has improved since.  Still has slurred speech and difficulty finding right words.  Patient denies any headache or new vision problem.  No new weakness in arms or legs.  There is chronic numbness of left leg.  Also noticed jerking and jaw movement starting today.  Patient also complains of intermittent chest discomfort and shortness of air along with dizziness which has been worked up extensively by his cardiologist in the past.      Personal History     Past Medical History:  Past Medical History:   Diagnosis Date   • Arthritis    • CHF (congestive heart failure) (HCC)    • Hypertension        Past Surgical History:  Past Surgical History:   Procedure Laterality Date   • COLONOSCOPY     • COLONOSCOPY N/A 2022    Procedure: COLONOSCOPY WITH POLYPECTOMIES;  Surgeon: Min Campbell MD;  Location: [facility] ENDOSCOPY;  Service: Gastroenterology;  Laterality: N/A;  COLON POLYPS, DIVERTICULOSIS   • ENDOSCOPY N/A 2022    Procedure: ESOPHAGOGASTRODUODENOSCOPY WITH BIOPSIES;  Surgeon: Min Campbell  MD;  Location: [facility] ENDOSCOPY;  Service: Gastroenterology;  Laterality: N/A;  HIATAL HERNIA   • HEMORRHOIDECTOMY         Family History:   family history is not on file.  No history of stroke.  Mother with CHF    Social History:    reports that he has never smoked. He has never used smokeless tobacco. He reports previous alcohol use. He reports that he does not use drugs.    Home Medications:  Etanercept, Testosterone Enanthate, aspirin, folic acid, furosemide, linaclotide, methotrexate, metoprolol succinate XL, oxyCODONE-acetaminophen, and potassium chloride    Allergies:  Allergies   Allergen Reactions   • Penicillins Hives       Review of Systems   All systems were reviewed and negative except for: H&P    Objective   Objective     Vitals:   Temp:  [98.3 °F (36.8 °C)] 98.3 °F (36.8 °C)  Heart Rate:  [35-94] 48  Resp:  [18-22] 20  BP: (108-164)/(46-83) 108/46    Physical Exam    Constitutional: Awake, alert, no acute distress   Eyes: Pupils equal, sclerae anicteric, no conjunctival injection   HENT: NCAT, mucous membranes moist   Neck: Supple, no thyromegaly, no lymphadenopathy, trachea midline   Respiratory: Decreased to auscultation bilaterally, nonlabored respirations    Cardiovascular: Irregularly irregular, no murmurs, rubs, or gallops, palpable pedal pulses bilaterally   Gastrointestinal: Positive bowel sounds, soft, nontender, nondistended   Musculoskeletal: No bilateral ankle edema, no clubbing or cyanosis to extremities   Psychiatric: Appropriate affect, cooperative   Neurologic: Oriented x 3, strength symmetric in all extremities, Cranial Nerves grossly intact to confrontation, speech does not appear slurred.   Skin: No rashes     Result Review    Result Review:  I have personally reviewed the results from the time of this admission to 9/10/2022 15:20 EDT and agree with these findings:  [x]  Laboratory  []  Microbiology  [x]  Radiology  [x]  EKG/Telemetry A. fib rate of 69 with PVCs and left bundle  branch block.  []  Cardiology/Vascular   []  Pathology  [x]  Old records  [x]  Other: Medication      Assessment & Plan   Assessment / Plan     Assessment:  Slurred speech/expressive aphasia.  Intermittent difficulty swallowing.  S/p PEG and modified barium swallow in March.  Permanent A. fib on anticoagulation with Eliquis.  Acute on chronic systolic CHF..  Rheumatoid arthritis on DMA RD.  Immunocompromise due to above.  Hypertension.  Cachexia.  BMI of 18    Plan:   Observe overnight and monitored bed.  Continue Eliquis  Check MRI of the brain.  2D echo.  Neurology Dr. Barkley consulted by ED.  PT OT and speech therapy.  Advance diet as tolerated.  IV Lasix.  Resume appropriate home medications  Discussed with the ED physician         DVT prophylaxis:  No DVT prophylaxis order currently exists.  Home Eliquis    CODE STATUS:         Admission Status:  I believe this patient meets observation status.    Part of this note may be an electronic transcription/translation of spoken language to printed text using the Dragon Dictation System.     Electronically signed by Júnior Hickman MD, 09/10/22, 3:20 PM EDT.

## 2022-09-10 NOTE — ED PROVIDER NOTES
"Time: 10:48 AM EDT  Arrived by: private car  Chief Complaint: Chest pain, dizziness  History provided by: Patient, family  History is limited by: N/A     History of Present Illness:  Patient is a 67 y.o. year old male who presents to the emergency department with intermittent chest discomfort and dizziness. Patient states he feels \"strange\" and woke up with these symptoms around 6:15am this morning (9/10/22). Patient was in the ED for chest pain and one sided numbness on 9/7/22. Patient states that he has been \"jerking,\"  his jaw feels \"funny,\" and his words are not \"coming out,\" and he has difficulty swallowing. Patient denies fever, chills, cough, sore throat, and rhinorrhea. Patient states his left side still feels numb. Patient reports SOB some times.     HPI    Similar Symptoms Previously: Yes  Recently seen: Yes      Patient Care Team  Primary Care Provider: Shelly Mejia MD    Past Medical History:     Allergies   Allergen Reactions   • Penicillins Hives     Past Medical History:   Diagnosis Date   • Arthritis    • CHF (congestive heart failure) (HCC)    • Hypertension      Past Surgical History:   Procedure Laterality Date   • COLONOSCOPY     • COLONOSCOPY N/A 6/27/2022    Procedure: COLONOSCOPY WITH POLYPECTOMIES;  Surgeon: Min Campbell MD;  Location: [facility] ENDOSCOPY;  Service: Gastroenterology;  Laterality: N/A;  COLON POLYPS, DIVERTICULOSIS   • ENDOSCOPY N/A 03/29/2022    Procedure: ESOPHAGOGASTRODUODENOSCOPY WITH BIOPSIES;  Surgeon: Min Campbell MD;  Location: [facility] ENDOSCOPY;  Service: Gastroenterology;  Laterality: N/A;  HIATAL HERNIA   • HEMORRHOIDECTOMY       No family history on file.    Home Medications:  Prior to Admission medications    Medication Sig Start Date End Date Taking? Authorizing Provider   aspirin 81 MG EC tablet Take 81 mg by mouth Daily.    Provider, MD Kat   Etanercept (Enbrel Mini) 50 MG/ML solution cartridge Inject 50 mg under the skin into the " "appropriate area as directed Every 7 (Seven) Days. Mon    Kat Aguilar MD   folic acid (FOLVITE) 1 MG tablet Take 1 mg by mouth Daily. 12/20/21   Kat Aguilar MD   furosemide (LASIX) 40 MG tablet Take 1 tablet by mouth 2 (Two) Times a Day for 90 days. 8/14/22 11/12/22  Sunshine Joy MD   linaclotide (LINZESS) 145 MCG capsule capsule Take 145 mcg by mouth Every Morning Before Breakfast.    Kat Aguilar MD   methotrexate 2.5 MG tablet Take 12.5 mg by mouth 1 (One) Time Per Week. Friday (5 tabs)    Kat Aguilar MD   metoprolol succinate XL (TOPROL-XL) 25 MG 24 hr tablet Take 25 mg by mouth Daily.    Kat Aguilar MD   oxyCODONE-acetaminophen (PERCOCET) 7.5-325 MG per tablet Take 1 tablet by mouth 2 (Two) Times a Day As Needed. 2/14/22   Kat Aguilar MD   potassium chloride 10 MEQ CR tablet Take 1 tablet by mouth Daily for 30 days. 8/14/22 9/13/22  Sunshine Joy MD   Testosterone Enanthate 200 MG/ML solution Inject 200 mg into the appropriate muscle as directed by prescriber Every 14 (Fourteen) Days. Wed    Kat Aguilar MD        Social History:   Social History     Tobacco Use   • Smoking status: Never Smoker   • Smokeless tobacco: Never Used   Substance Use Topics   • Alcohol use: Not Currently   • Drug use: Never         Review of Systems:  Review of Systems   Constitutional: Negative for chills and fever.   HENT: Positive for trouble swallowing. Negative for rhinorrhea and sore throat.    Respiratory: Positive for shortness of breath (occasionally ). Negative for cough.    Cardiovascular: Positive for chest pain (intermittent chest discomfort ).   Gastrointestinal: Negative for diarrhea, nausea and vomiting.   Neurological: Positive for dizziness, speech difficulty and numbness.        Physical Exam:  /67 (BP Location: Left arm, Patient Position: Lying)   Pulse 62   Temp 98.6 °F (37 °C) (Oral)   Resp 20   Ht 173.7 cm (68.4\")   Wt 60.9 kg " (134 lb 4.2 oz)   SpO2 100%   BMI 20.18 kg/m²     Physical Exam  Vitals and nursing note reviewed.   Constitutional:       General: He is not in acute distress.     Appearance: Normal appearance. He is not toxic-appearing.   HENT:      Head: Normocephalic and atraumatic.      Jaw: There is normal jaw occlusion.      Mouth/Throat:      Mouth: Mucous membranes are moist.   Eyes:      General: Lids are normal.      Extraocular Movements: Extraocular movements intact.      Conjunctiva/sclera: Conjunctivae normal.      Pupils: Pupils are equal, round, and reactive to light.   Cardiovascular:      Rate and Rhythm: Normal rate. Rhythm regularly irregular.      Pulses: Normal pulses.      Heart sounds: Normal heart sounds.   Pulmonary:      Effort: Pulmonary effort is normal. No respiratory distress.      Breath sounds: Normal breath sounds. No wheezing or rhonchi.   Abdominal:      General: Abdomen is flat. Bowel sounds are normal.      Palpations: Abdomen is soft.      Tenderness: There is no abdominal tenderness. There is no guarding or rebound.   Musculoskeletal:         General: Normal range of motion.      Cervical back: Normal range of motion and neck supple.      Right lower leg: No edema.      Left lower leg: No edema.   Skin:     General: Skin is warm and dry.   Neurological:      Mental Status: He is alert and oriented to person, place, and time. Mental status is at baseline.      Comments: Mild intermittent aphasia and difficulty swallowing   No focal finding on examination   Psychiatric:         Mood and Affect: Mood normal.                Medications in the Emergency Department:  Medications   folic acid (FOLVITE) tablet 1 mg (1 mg Oral Given 9/11/22 0853)   metoprolol succinate XL (TOPROL-XL) 24 hr tablet 25 mg (25 mg Oral Given 9/11/22 0853)   sodium chloride 0.9 % flush 10 mL (has no administration in time range)   sodium chloride 0.9 % flush 10 mL (10 mL Intravenous Given 9/11/22 0852)   sodium chloride  0.9 % infusion 40 mL (has no administration in time range)   atorvastatin (LIPITOR) tablet 80 mg (80 mg Oral Given 9/10/22 2105)   HYDROcodone-acetaminophen (NORCO) 5-325 MG per tablet 1 tablet (1 tablet Oral Given 9/10/22 1803)   ondansetron (ZOFRAN) injection 4 mg (has no administration in time range)   docusate sodium (COLACE) capsule 100 mg (100 mg Oral Given 9/11/22 0852)   aluminum-magnesium hydroxide-simethicone (MAALOX MAX) 400-400-40 MG/5ML suspension 7.5 mL (has no administration in time range)   bisacodyl (DULCOLAX) suppository 10 mg (has no administration in time range)   apixaban (ELIQUIS) tablet 5 mg (5 mg Oral Given 9/11/22 0852)   pantoprazole (PROTONIX) EC tablet 40 mg (40 mg Oral Not Given 9/11/22 1622)   furosemide (LASIX) tablet 40 mg (has no administration in time range)        Labs  Lab Results (last 24 hours)     Procedure Component Value Units Date/Time    Lipid Panel [[id number]]  (Abnormal) Collected: 09/11/22 0450    Specimen: Blood Updated: 09/11/22 0536     Total Cholesterol 128 mg/dL      Triglycerides 74 mg/dL      HDL Cholesterol 33 mg/dL      LDL Cholesterol  80 mg/dL      VLDL Cholesterol 15 mg/dL      LDL/HDL Ratio 2.43    Narrative:      Cholesterol Reference Ranges  (U.S. Department of Health and Human Services ATP III Classifications)    Desirable          <200 mg/dL  Borderline High    200-239 mg/dL  High Risk          >240 mg/dL      Triglyceride Reference Ranges  (U.S. Department of Health and Human Services ATP III Classifications)    Normal           <150 mg/dL  Borderline High  150-199 mg/dL  High             200-499 mg/dL  Very High        >500 mg/dL    HDL Reference Ranges  (U.S. Department of Health and Human Services ATP III Classifications)    Low     <40 mg/dl (major risk factor for CHD)  High    >60 mg/dl ('negative' risk factor for CHD)        LDL Reference Ranges  (U.S. Department of Health and Human Services ATP III Classifications)    Optimal          <100  mg/dL  Near Optimal     100-129 mg/dL  Borderline High  130-159 mg/dL  High             160-189 mg/dL  Very High        >189 mg/dL    CK [[id number]]  (Abnormal) Collected: 09/11/22 0450    Specimen: Blood Updated: 09/11/22 1501     Creatine Kinase 496 U/L     Hemoglobin A1c [[id number]]  (Normal) Collected: 09/11/22 0845    Specimen: Blood Updated: 09/11/22 1727     Hemoglobin A1C 5.40 %     Narrative:      Hemoglobin A1C Ranges:    Increased Risk for Diabetes  5.7% to 6.4%  Diabetes                     >= 6.5%  Diabetic Goal                < 7.0%           Imaging:  MRI Brain Without Contrast    Result Date: 9/11/2022  PROCEDURE: MRI BRAIN WO CONTRAST  COMPARISON: [facility], MR, BRAIN W/WO CONTRAST, 8/02/2017, 15:23.  INDICATIONS: Left Upper/Lower Extremity Tremors/Paresthesia      TECHNIQUE: A variety of imaging planes and parameters were utilized for visualization of suspected pathology.  Images were performed without contrast.  FINDINGS:  There is motion artifact apparent on a few of the pulse sequences.  There is no restricted diffusion to indicate acute ischemia.  There are few punctate areas of periventricular and subcortical white matter signal felt to represent chronic microvascular ischemia.  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  There is right cerebellar hemisphere volume loss secondary to encephalomalacia also felt to be due to an old infarct.  There is mild generalized volume loss.  There is prominence of the sulci, fissures, and basal cisterns.  The ventricle size and configuration is considered normal.  There is no acute hemorrhage, midline shift, or suspicious extra-axial fluid collections.  There are normal signal voids within the intracranial arteries and the major dural sinuses.  The orbital contents are normal.  The visualized paranasal and mastoid sinuses are clear.        1. The study is slightly limited by motion artifact.  2. No acute ischemia. 3. Chronic ischemic changes. 4. Mild generalized volume loss secondary to cerebral atrophy.     ANDI ARAMBULA MD       Electronically Signed and Approved By: ANDI ARAMBULA MD on 9/11/2022 at 13:59               Procedures:  Procedures    Progress                            Medical Decision Making:  MDM     Final diagnoses:   Dysarthria   Aphasia        Disposition:  ED Disposition     ED Disposition   Decision to Admit    Condition   --    Comment   Level of Care: Telemetry [5]   Diagnosis: Dysarthria [784.51.ICD-9-CM]   Admitting Physician: CHANDLER MITCHELL [[id number]]   Attending Physician: CHANDLER MITCHELL [[id number]]               This medical record created using voice recognition software.      Documentation assistance provided by Dolores Medina acting as scribe for Dr. Jonah Espinoza DO. Information recorded by the scribe was done at my direction and has been verified and validated by me.        Dolores Medina  09/10/22 1056       Jonah Espinoza DO  09/11/22 4826

## 2022-09-11 ENCOUNTER — APPOINTMENT (OUTPATIENT)
Dept: MRI IMAGING | Facility: HOSPITAL | Age: 67
End: 2022-09-11

## 2022-09-11 ENCOUNTER — APPOINTMENT (OUTPATIENT)
Dept: CARDIOLOGY | Facility: HOSPITAL | Age: 67
End: 2022-09-11

## 2022-09-11 LAB
BH CV ECHO MEAS - AO ROOT DIAM: 3.8 CM
BH CV ECHO MEAS - EDV(MOD-SP2): 81 ML
BH CV ECHO MEAS - EDV(MOD-SP4): 81 ML
BH CV ECHO MEAS - EF(MOD-BP): 43.2 %
BH CV ECHO MEAS - ESV(MOD-SP2): 46 ML
BH CV ECHO MEAS - ESV(MOD-SP4): 46 ML
BH CV ECHO MEAS - IVSD: 1.6 CM
BH CV ECHO MEAS - LA DIMENSION: 4 CM
BH CV ECHO MEAS - LAT PEAK E' VEL: 9.8 CM/SEC
BH CV ECHO MEAS - LVIDD: 4.2 CM
BH CV ECHO MEAS - LVIDS: 2.7 CM
BH CV ECHO MEAS - LVOT DIAM: 2 CM
BH CV ECHO MEAS - LVPWD: 1.2 CM
BH CV ECHO MEAS - MED PEAK E' VEL: 7.56 CM/SEC
BH CV ECHO MEAS - MV A MAX VEL: 52 CM/SEC
BH CV ECHO MEAS - MV DEC TIME: 175 MSEC
BH CV ECHO MEAS - MV E MAX VEL: 86 CM/SEC
BH CV ECHO MEAS - MV E/A: 1.6
BH CV ECHO MEAS - RVDD: 2.9 CM
BH CV ECHO MEAS - TAPSE (>1.6): 1.74 CM
BH CV ECHO MEAS - TR MAX PG: 42 MMHG
BH CV ECHO MEASUREMENTS AVERAGE E/E' RATIO: 9.91
CHOLEST SERPL-MCNC: 128 MG/DL (ref 0–200)
CK SERPL-CCNC: 496 U/L (ref 20–200)
HBA1C MFR BLD: 5.4 % (ref 4.8–5.6)
HDLC SERPL-MCNC: 33 MG/DL (ref 40–60)
IVRT: 71 MSEC
LDLC SERPL CALC-MCNC: 80 MG/DL (ref 0–100)
LDLC/HDLC SERPL: 2.43 {RATIO}
LEFT ATRIUM VOLUME INDEX: 17.7 ML/M2
MAXIMAL PREDICTED HEART RATE: 153 BPM
QT INTERVAL: 412 MS
QT INTERVAL: 684 MS
STRESS TARGET HR: 130 BPM
TRIGL SERPL-MCNC: 74 MG/DL (ref 0–150)
VLDLC SERPL-MCNC: 15 MG/DL (ref 5–40)

## 2022-09-11 PROCEDURE — 97165 OT EVAL LOW COMPLEX 30 MIN: CPT

## 2022-09-11 PROCEDURE — 99226 PR SBSQ OBSERVATION CARE/DAY 35 MINUTES: CPT | Performed by: INTERNAL MEDICINE

## 2022-09-11 PROCEDURE — G0378 HOSPITAL OBSERVATION PER HR: HCPCS

## 2022-09-11 PROCEDURE — 93306 TTE W/DOPPLER COMPLETE: CPT

## 2022-09-11 PROCEDURE — 97161 PT EVAL LOW COMPLEX 20 MIN: CPT

## 2022-09-11 PROCEDURE — 25010000002 FUROSEMIDE PER 20 MG: Performed by: INTERNAL MEDICINE

## 2022-09-11 PROCEDURE — 70551 MRI BRAIN STEM W/O DYE: CPT

## 2022-09-11 PROCEDURE — 83036 HEMOGLOBIN GLYCOSYLATED A1C: CPT | Performed by: INTERNAL MEDICINE

## 2022-09-11 PROCEDURE — 80061 LIPID PANEL: CPT | Performed by: INTERNAL MEDICINE

## 2022-09-11 PROCEDURE — 82550 ASSAY OF CK (CPK): CPT | Performed by: PSYCHIATRY & NEUROLOGY

## 2022-09-11 PROCEDURE — 97116 GAIT TRAINING THERAPY: CPT

## 2022-09-11 PROCEDURE — 96376 TX/PRO/DX INJ SAME DRUG ADON: CPT

## 2022-09-11 PROCEDURE — 99213 OFFICE O/P EST LOW 20 MIN: CPT | Performed by: PSYCHIATRY & NEUROLOGY

## 2022-09-11 RX ORDER — ASPIRIN 81 MG/1
81 TABLET ORAL DAILY
Status: DISCONTINUED | OUTPATIENT
Start: 2022-09-11 | End: 2022-09-11

## 2022-09-11 RX ORDER — DIGOXIN 125 MCG
25 TABLET ORAL
COMMUNITY
End: 2022-09-23 | Stop reason: HOSPADM

## 2022-09-11 RX ORDER — PANTOPRAZOLE SODIUM 40 MG/1
40 TABLET, DELAYED RELEASE ORAL DAILY
Status: DISCONTINUED | OUTPATIENT
Start: 2022-09-11 | End: 2022-09-12 | Stop reason: HOSPADM

## 2022-09-11 RX ORDER — FUROSEMIDE 40 MG/1
40 TABLET ORAL 2 TIMES DAILY
Status: DISCONTINUED | OUTPATIENT
Start: 2022-09-11 | End: 2022-09-12 | Stop reason: HOSPADM

## 2022-09-11 RX ORDER — PANTOPRAZOLE SODIUM 20 MG/1
20 TABLET, DELAYED RELEASE ORAL DAILY
COMMUNITY

## 2022-09-11 RX ADMIN — APIXABAN 5 MG: 5 TABLET, FILM COATED ORAL at 08:52

## 2022-09-11 RX ADMIN — Medication 10 ML: at 20:30

## 2022-09-11 RX ADMIN — FUROSEMIDE 40 MG: 40 TABLET ORAL at 20:31

## 2022-09-11 RX ADMIN — Medication 10 ML: at 08:52

## 2022-09-11 RX ADMIN — METOPROLOL SUCCINATE 25 MG: 25 TABLET, EXTENDED RELEASE ORAL at 08:53

## 2022-09-11 RX ADMIN — APIXABAN 5 MG: 5 TABLET, FILM COATED ORAL at 20:31

## 2022-09-11 RX ADMIN — FUROSEMIDE 20 MG: 10 INJECTION, SOLUTION INTRAMUSCULAR; INTRAVENOUS at 08:53

## 2022-09-11 RX ADMIN — DOCUSATE SODIUM 100 MG: 100 CAPSULE, LIQUID FILLED ORAL at 08:52

## 2022-09-11 RX ADMIN — ATORVASTATIN CALCIUM 80 MG: 40 TABLET, FILM COATED ORAL at 20:31

## 2022-09-11 RX ADMIN — FOLIC ACID 1 MG: 1 TABLET ORAL at 08:53

## 2022-09-11 RX ADMIN — HYDROCODONE BITARTRATE AND ACETAMINOPHEN 1 TABLET: 5; 325 TABLET ORAL at 20:30

## 2022-09-11 NOTE — PLAN OF CARE
Goal Outcome Evaluation:  Plan of Care Reviewed With: patient, spouse        Progress: no change  Outcome Evaluation: Patient presents with limitations that impede his/her ability to perform ADLS. The skills of a therapist are necessary to maximize independence with ADLs.  Recommend skilled nursing facility.

## 2022-09-11 NOTE — PAYOR COMM NOTE
"Hai Gold (67 y.o. Male)     Unable to submit via [employer]  OBS notification clinicals  DOS: 9/10/22 via the Emergency Dept.   Medical Visit.   Dx Code: R47.01 & R47.1  CPT: 21897. Qty: 1.  DOS: 9/10/22-9/14/22.   Facility: [facility], [address] [facility] [id number] [phone]   NPI: [id number],   MD: Dr Lazaro May, NPI: [id number]  [address], [address], [place] [address],   Phone: [phone].                 Date of Birth   1955    Social Security Number       Address   [address] [place] [address]    Home Phone   [phone]    MRN   [id number]       Mandaeism   None    Marital Status                               Admission Date   9/10/22    Admission Type   Emergency    Admitting Provider   Lazaro May MD    Attending Provider   Lazaro May MD    Department, Room/Bed   [facility] PROGRESSIVE CARE UNIT, 219/1       Discharge Date       Discharge Disposition       Discharge Destination                               Attending Provider: Lazaro May MD    Allergies: Penicillins    Isolation: None   Infection: COVID (History) (09/10/22)   Code Status: Prior   Advance Care Planning Activity    Ht: 173.7 cm (68.4\")   Wt: 60.9 kg (134 lb 4.2 oz)    Admission Cmt: None   Principal Problem: None                Active Insurance as of 9/10/2022     Primary Coverage     Payor Plan Insurance Group Employer/Plan Group    HUMANA MEDICARE REPLACEMENT HUMANA MEDICARE REPLACEMENT [id number]     Payor Plan Address Payor Plan Phone Number Payor Plan Fax Number Effective Dates    [address] [address] [phone]  1/1/2022 - None Entered    [facility] [id number]       Subscriber Name Subscriber Birth Date Member ID       HAI GOLD 1955 [id number]                 Emergency Contacts      (Rel.) Home Phone Work Phone Mobile Phone    JOSETTE GOLD (Spouse) [phone] -- [phone]            Gao: NPI [id number] Tax ID " [id number]           General Criteria: Observation Care - Observation Care Admission Criteria by Gladys Dickson, RN         Met: Reviewed on 9/10/2022 by Gladys Dickson, RN       Created Using Review Status Review Entered   Indicia® Completed 9/10/2022 21:45       Criteria Set Name - Subset   General Criteria: Observation Care - Observation Care Admission Criteria      Criteria Review      Observation Care Admission Criteria    Most Recent : Gladys Dickson Most Recent Date: 9/10/2022 21:45:07 EDST    (X) Observation care [A] [B] [C] [D] is indicated for  ALL  of the following  (1) (2) (3) (4)    (5) (6) (7) (8) (9) (10):       (X) Clinical care (eg, testing, monitoring, or treatment) needed beyond the usual emergency department       time frame (eg, 3 to 4 hours)       (X) Clinical care needed is not appropriate for a lower level of care (ie, discharge to outpatient       setting not appropriate).       (X) Patient has clinical condition for which observation care is needed, as indicated by  1 or       more  of the following :          (X) Neurologic condition or finding (eg, Altered mental status, confusion, weakness, dizziness,          ataxia, exacerbation of neuromuscular disease or movement disorder) (29)          9/10/2022 21:45:07 EDST by Gladys Dickson            Mild intermittent aphasia and difficulty swallowing   No focal finding on examination    Notes:    9/10/2022 21:45:07 EDST by Gladys Dickson    Subject: Admission    To ED c/o inermittant aphasia & myoclonic jerks.  Mild intermittent aphasia and difficulty swallowing    No focal finding on examination/   Difficulty swallowing X 6 months. Has seen PCP.      PMHx: A fib; HTN, Pacemaker; COVID last month, RA on Enbrel & methotrexate. Difficulty swallowing X 6 months.      BNP 2006.0; Head CT with no acute processes.      In ED: Passed dysphagia test. No meds given in ED. Pt took ASA PTA.      Admit: Neuro consult. Neuro checks q4 hrs. MRI. ECHO;  PT/OT/SLP; Lasix IV bid; Eliquis PO bid;           History & Physical      Júnior Hickman MD at 09/10/22 1520           [facility] HISTORY AND PHYSICAL  Date: 9/10/2022   Patient Name: Hai Gold  : 1955  MRN: [id number]  Primary Care Physician:  Shelly Mejia MD  Date of admission: 9/10/2022    Subjective   Subjective     Chief Complaint: Speech difficulty and difficulty swallowing started today    HPI:    Hai Gold is a 67 y.o. male with past medical history of A. fib recently started on Eliquis by Dr. Matheus Urrutia, systolic CHF  , COVID-19 infection , hypertension, rheumatoid arthritis on Enbrel and methotrexate by Dr. zavaleta and history of difficulty swallowing as well as with Dr. Campbell in March and recommended back placement refused by patient and the family.  Patient woke up with slurred speech and difficulty swallowing especially water felt like lump in his throat.  This difficulty is different than his baseline as per family at bedside.  Difficulty swallowing has improved since.  Still has slurred speech and difficulty finding right words.  Patient denies any headache or new vision problem.  No new weakness in arms or legs.  There is chronic numbness of left leg.  Also noticed jerking and jaw movement starting today.  Patient also complains of intermittent chest discomfort and shortness of air along with dizziness which has been worked up extensively by his cardiologist in the past.      Personal History     Past Medical History:  Past Medical History:   Diagnosis Date   • Arthritis    • CHF (congestive heart failure) (HCC)    • Hypertension        Past Surgical History:  Past Surgical History:   Procedure Laterality Date   • COLONOSCOPY     • COLONOSCOPY N/A 2022    Procedure: COLONOSCOPY WITH POLYPECTOMIES;  Surgeon: Min Campbell MD;  Location: [facility] ENDOSCOPY;  Service: Gastroenterology;  Laterality: N/A;  COLON POLYPS,  DIVERTICULOSIS   • ENDOSCOPY N/A 03/29/2022    Procedure: ESOPHAGOGASTRODUODENOSCOPY WITH BIOPSIES;  Surgeon: Min Campbell MD;  Location: [facility] ENDOSCOPY;  Service: Gastroenterology;  Laterality: N/A;  HIATAL HERNIA   • HEMORRHOIDECTOMY         Family History:   family history is not on file.  No history of stroke.  Mother with CHF    Social History:    reports that he has never smoked. He has never used smokeless tobacco. He reports previous alcohol use. He reports that he does not use drugs.    Home Medications:  Etanercept, Testosterone Enanthate, aspirin, folic acid, furosemide, linaclotide, methotrexate, metoprolol succinate XL, oxyCODONE-acetaminophen, and potassium chloride    Allergies:  Allergies   Allergen Reactions   • Penicillins Hives       Review of Systems   All systems were reviewed and negative except for: H&P    Objective   Objective     Vitals:   Temp:  [98.3 °F (36.8 °C)] 98.3 °F (36.8 °C)  Heart Rate:  [35-94] 48  Resp:  [18-22] 20  BP: (108-164)/(46-83) 108/46    Physical Exam    Constitutional: Awake, alert, no acute distress   Eyes: Pupils equal, sclerae anicteric, no conjunctival injection   HENT: NCAT, mucous membranes moist   Neck: Supple, no thyromegaly, no lymphadenopathy, trachea midline   Respiratory: Decreased to auscultation bilaterally, nonlabored respirations    Cardiovascular: Irregularly irregular, no murmurs, rubs, or gallops, palpable pedal pulses bilaterally   Gastrointestinal: Positive bowel sounds, soft, nontender, nondistended   Musculoskeletal: No bilateral ankle edema, no clubbing or cyanosis to extremities   Psychiatric: Appropriate affect, cooperative   Neurologic: Oriented x 3, strength symmetric in all extremities, Cranial Nerves grossly intact to confrontation, speech does not appear slurred.   Skin: No rashes     Result Review    Result Review:  I have personally reviewed the results from the time of this admission to 9/10/2022 15:20 EDT and agree with  these findings:  [x]  Laboratory  []  Microbiology  [x]  Radiology  [x]  EKG/Telemetry A. fib rate of 69 with PVCs and left bundle branch block.  []  Cardiology/Vascular   []  Pathology  [x]  Old records  [x]  Other: Medication      Assessment & Plan   Assessment / Plan     Assessment:  Slurred speech/expressive aphasia.  Intermittent difficulty swallowing.  S/p PEG and modified barium swallow in March.  Permanent A. fib on anticoagulation with Eliquis.  Acute on chronic systolic CHF..  Rheumatoid arthritis on DMA RD.  Immunocompromise due to above.  Hypertension.  Cachexia.  BMI of 18    Plan:   Observe overnight and monitored bed.  Continue Eliquis  Check MRI of the brain.  2D echo.  Neurology Dr. Barkley consulted by ED.  PT OT and speech therapy.  Advance diet as tolerated.  IV Lasix.  Resume appropriate home medications  Discussed with the ED physician         DVT prophylaxis:  No DVT prophylaxis order currently exists.  Home Eliquis    CODE STATUS:         Admission Status:  I believe this patient meets observation status.    Part of this note may be an electronic transcription/translation of spoken language to printed text using the Dragon Dictation System.     Electronically signed by Júnior Hickman MD, 09/10/22, 3:20 PM EDT.             Electronically signed by Júnior Hickman MD at 09/10/22 1530         Lab Results (last 24 hours)     Procedure Component Value Units Date/Time    POC Troponin I with Hold Tube [[id number]] Collected: 09/10/22 1019    Specimen: Blood Updated: 09/10/22 1221    Narrative:      The following orders were created for panel order POC Troponin I with Hold Tube.  Procedure                               Abnormality         Status                     ---------                               -----------         ------                     POC Troponin I[[id number]]                                                              HOLD Troponin-I Tube[[id number]]                              Final result                 Please view results for these tests on the individual orders.    HOLD Troponin-I Tube [[id number]] Collected: 09/10/22 1019    Specimen: Blood Updated: 09/10/22 1221     Extra Tube Hold for add-ons.     Comment: Auto resulted.       Comprehensive Metabolic Panel [[id number]]  (Abnormal) Collected: 09/10/22 0830    Specimen: Blood Updated: 09/10/22 1037     Glucose 96 mg/dL      BUN 22 mg/dL      Creatinine 1.11 mg/dL      Sodium 135 mmol/L      Potassium 4.8 mmol/L      Chloride 96 mmol/L      CO2 25.8 mmol/L      Calcium 9.5 mg/dL      Total Protein 9.6 g/dL      Albumin 3.80 g/dL      ALT (SGPT) 7 U/L      AST (SGOT) 13 U/L      Alkaline Phosphatase 92 U/L      Total Bilirubin 0.3 mg/dL      Globulin 5.8 gm/dL      A/G Ratio 0.7 g/dL      BUN/Creatinine Ratio 19.8     Anion Gap 13.2 mmol/L      eGFR 72.8 mL/min/1.73      Comment: National Kidney Foundation and American Society of Nephrology (ASN) Task Force recommended calculation based on the Chronic Kidney Disease Epidemiology Collaboration (CKD-EPI) equation refit without adjustment for race.       Narrative:      GFR Normal >60  Chronic Kidney Disease <60  Kidney Failure <15      Lipase [[id number]]  (Normal) Collected: 09/10/22 0830    Specimen: Blood Updated: 09/10/22 1037     Lipase 15 U/L     Magnesium [[id number]]  (Normal) Collected: 09/10/22 0830    Specimen: Blood Updated: 09/10/22 1037     Magnesium 2.1 mg/dL     POC Troponin I [[id number]]  (Normal) Collected: 09/10/22 1023    Specimen: Blood Updated: 09/10/22 1035     Troponin I 0.00 ng/mL      Comment: Serial Number: [id number]:  [id number]       BNP [[id number]]  (Abnormal) Collected: 09/10/22 0830    Specimen: Blood Updated: 09/10/22 1034     proBNP 2,006.0 pg/mL     Narrative:      Among patients with dyspnea, NT-proBNP is highly sensitive for the detection of acute congestive heart failure. In addition NT-proBNP of <300 pg/ml effectively rules out acute congestive heart  failure with 99% negative predictive value.    Results may be falsely decreased if patient taking Biotin.      CBC & Differential [[id number]]  (Abnormal) Collected: 09/10/22 0830    Specimen: Blood Updated: 09/10/22 0936    Narrative:      The following orders were created for panel order CBC & Differential.  Procedure                               Abnormality         Status                     ---------                               -----------         ------                     CBC Auto Differential[[id number]]        Abnormal            Final result                 Please view results for these tests on the individual orders.    CBC Auto Differential [[id number]]  (Abnormal) Collected: 09/10/22 0830    Specimen: Blood Updated: 09/10/22 0936     WBC 10.36 10*3/mm3      RBC 4.11 10*6/mm3      Hemoglobin 12.3 g/dL      Hematocrit 38.2 %      MCV 92.9 fL      MCH 29.9 pg      MCHC 32.2 g/dL      RDW 14.6 %      RDW-SD 49.6 fl      MPV 9.2 fL      Platelets 433 10*3/mm3      Neutrophil % 71.5 %      Lymphocyte % 19.8 %      Monocyte % 7.6 %      Eosinophil % 0.5 %      Basophil % 0.3 %      Immature Grans % 0.3 %      Neutrophils, Absolute 7.41 10*3/mm3      Lymphocytes, Absolute 2.05 10*3/mm3      Monocytes, Absolute 0.79 10*3/mm3      Eosinophils, Absolute 0.05 10*3/mm3      Basophils, Absolute 0.03 10*3/mm3      Immature Grans, Absolute 0.03 10*3/mm3      nRBC 0.0 /100 WBC     [place] Draw [[id number]] Collected: 09/10/22 0815    Specimen: Blood Updated: 09/10/22 0932    Narrative:      The following orders were created for panel order [place] Draw.  Procedure                               Abnormality         Status                     ---------                               -----------         ------                     Green Top (Gel)[[id number]]                                  Final result               Lavender Top[[id number]]                                     Final result               Gold Top - SST[[id number]]                                    Final result               Light Blue Top[[id number]]                                                                Please view results for these tests on the individual orders.    Green Top (Gel) [[id number]] Collected: 09/10/22 0830    Specimen: Blood Updated: 09/10/22 0932     Extra Tube Hold for add-ons.     Comment: Auto resulted.       Lavender Top [[id number]] Collected: 09/10/22 0830    Specimen: Blood Updated: 09/10/22 0932     Extra Tube hold for add-on     Comment: Auto resulted       POC Troponin I [[id number]]  (Normal) Collected: 09/10/22 0844    Specimen: Blood Updated: 09/10/22 0857     Troponin I 0.02 ng/mL      Comment: Serial Number: [id number]:  [id number]       Gold Top - SST [[id number]] Collected: 09/10/22 0815    Specimen: Blood Updated: 09/10/22 0855     Extra Tube Hold for add-ons.     Comment: Auto resulted.           Imaging Results (Last 24 Hours)     Procedure Component Value Units Date/Time    CT Head Without Contrast [[id number]] Collected: 09/10/22 1132     Updated: 09/10/22 1135    Narrative:      PROCEDURE: CT HEAD WO CONTRAST     COMPARISON:  [facility], CT, HEAD W/O CSPINE W/O CONTRAST, 1/21/2020, 14:23.  INDICATIONS: Weakness and difficulty swallowing     PROTOCOL:   Standard imaging protocol performed      RADIATION:   DLP: 1017.2 mGy*cm    MA and/or KV was adjusted to minimize radiation dose.          TECHNIQUE: After obtaining the patient's consent, CT images were obtained without non-ionic   intravenous contrast material.      FINDINGS:   There is no acute intracranial hemorrhage or extra-axial collection. The ventricles appear normal   in caliber, with no evidence of mass effect or midline shift. The basal cisterns are patent. The   gray-white differentiation is preserved.  Encephalomalacia within the right cerebellar hemisphere   appears unchanged.     The calvarium is intact. The paranasal sinuses and mastoid air cells are  well-aerated.       Impression:         1. No acute intracranial process identified.  2. Stable encephalomalacia within right cerebellum.            LAUREANO GERMAIN MD         Electronically Signed and Approved By: LAUREANO GERMAIN MD on 9/10/2022 at 11:31                     XR Chest 1 View [[id number]] Collected: 09/10/22 0910     Updated: 09/10/22 0913    Narrative:      PROCEDURE: XR CHEST 1 VW     COMPARISON: [facility], CT, CT CHEST W CONTRAST DIAGNOSTIC, 2022, 19:38.  [facility], CR, XR CHEST 1 VW, 2022, 16:47.     INDICATIONS: CHEST PAIN     FINDINGS:   LUNGS: Normal.  No significant pulmonary parenchymal abnormalities.    VASCULATURE: Normal.  Unremarkable pulmonary vasculature.    CARDIAC: The heart is enlarged.  MEDIASTINUM: Normal.  No visible mass or adenopathy.    PLEURA: Normal.  No effusion or pleural thickening.    BONES: Normal.  No fracture or visible bony lesion.    OTHER: Negative.         Impression:         1. Lungs are clear.  2. Cardiomegaly.                  LAUREANO GERMAIN MD         Electronically Signed and Approved By: LAUREANO GERMAIN MD on 9/10/2022 at 9:09                            Consult Notes (last 24 hours)      Douglas Barkley MD at 09/10/22 1806      Consult Orders    1. Inpatient Neurology Consult Stroke [[id number]] ordered by Júnior Hickman MD at 09/10/22 1403                 [facility]   Neurology Consult Note    Patient Name: Hai Gold  : 1955  MRN: [id number]  Primary Care Physician:  Shelly Mejia MD  Referring Physician: No ref. provider found  Date of admission: 9/10/2022    Subjective   Subjective     Reason for Consult/ Chief Complaint: Stuttering, myoclonic jerks, headache, swallowing difficulties, lump in the throat, numbness in the left leg, pain in the arms.    HPI:  Hai Gold is a 67 y.o. male he states that he woke up this morning and he states that his speech was slow  and he had difficulty in getting his words out and he was stuttering.  He also felt that he had a headache and felt like his body was jerking.  He states that this is the first time this is Jeric in the last time he jerk was 2 months ago.  He does not usually have the symptoms.  He has been admitted several times for chest discomfort, nonspecific symptoms and recently had a CT scan of the chest this month which is unremarkable.  He is taking Enbrel as well as methotrexate for severe rheumatoid arthritis.  He has a history of congestive heart failure and is also being followed by cardiology.  It was noted in previous notes that he was in atrial fibrillation and according to notes he has been started on Eliquis recently however I do not see this in his home medications.  He was admitted last month for dyspnea and congestive heart failure.    He states that he is independent with activities of daily living.  He lives with his wife.  He drove yesterday.  He states that his been taking 1-2 pain medications a day usually in the evenings.  He has not taken more.    CT scan of the brain is negative.  EKG is atrial fibrillation.  Magnesium and calcium are normal.    Personal History     Past Medical History:   Diagnosis Date   • Arthritis    • CHF (congestive heart failure) (HCC)    • Hypertension        Past Surgical History:   Procedure Laterality Date   • COLONOSCOPY     • COLONOSCOPY N/A 6/27/2022    Procedure: COLONOSCOPY WITH POLYPECTOMIES;  Surgeon: Min Campbell MD;  Location: [facility] ENDOSCOPY;  Service: Gastroenterology;  Laterality: N/A;  COLON POLYPS, DIVERTICULOSIS   • ENDOSCOPY N/A 03/29/2022    Procedure: ESOPHAGOGASTRODUODENOSCOPY WITH BIOPSIES;  Surgeon: Min Campbell MD;  Location: [facility] ENDOSCOPY;  Service: Gastroenterology;  Laterality: N/A;  HIATAL HERNIA   • HEMORRHOIDECTOMY         Family History: family history is not on file. Otherwise pertinent FHx was reviewed and not pertinent to  current issue.    Social History:  reports that he has never smoked. He has never used smokeless tobacco. He reports previous alcohol use. He reports that he does not use drugs.    Home Medications:  Etanercept, Testosterone Enanthate, aspirin, folic acid, furosemide, linaclotide, methotrexate, metoprolol succinate XL, oxyCODONE-acetaminophen, and potassium chloride    Allergies:  Allergies   Allergen Reactions   • Penicillins Hives       Objective    Objective     Vitals:   Temp:  [98.3 °F (36.8 °C)-98.9 °F (37.2 °C)] 98.9 °F (37.2 °C)  Heart Rate:  [35-94] 75  Resp:  [18-22] 18  BP: (108-164)/(46-83) 131/79    Physical Exam: He is alert, fluent, stuttering, follows commands well.  He is able to give me the history as noted above.  He has intermittent myoclonic jerks.  Visual fields full confrontation, EOMs are full directions of gaze, facial strength is full, soft palate elevation and tongue normal.  There is giveaway weakness of the upper or lower extremities.  He is able to get up from a lying down to sitting position into a standing position.  He has some myoclonic jerks which his knees tend to buckle.  Reflexes are absent in the biceps, triceps, patellar's and ankles.  Cerebellar testing is intact.  Hear irregular rhythm.      Result Review    Result Review:  I have personally reviewed the results from the time of this admission to 9/10/2022 18:06 EDT and agree with these findings:  [x]  Laboratory  []  Microbiology  [x]  Radiology  [x]  EKG/Telemetry   [x]  Cardiology/Vascular   []  Pathology  [x]  Old records  []  Other:      Assessment & Plan   Assessment / Plan   Active Hospital Problems:  Active Hospital Problems    Diagnosis    • Expressive aphasia    • Dysphagia    • Dysarthria          Plan: I discussed with him that his myoclonic jerks as well as his stuttering may be secondary to metabolic encephalopathy which can be seen in hypoxia, use of narcotic medications.  I discussed with him that we will get  an MRI of the brain.  In rare cases demyelinating disease can be seen with use of Enbrel and methotrexate.  I do not think he has a stroke.  He is in atrial fibrillation and at this time is not on Eliquis.    Total time spent with the patient and coordinating patient care was 55 minutes.    electronically signed by Douglas Barkley MD, 09/10/22, 6:06 PM EDT.    Electronically signed by Douglas Barkley MD at 09/10/22 1750

## 2022-09-11 NOTE — THERAPY EVALUATION
Patient Name: Hai Gold  : 1955    MRN: [id number]                              Today's Date: 2022       Admit Date: 9/10/2022    Visit Dx:     ICD-10-CM ICD-9-CM   1. Dysarthria  R47.1 784.51   2. Aphasia  R47.01 784.3   3. Difficulty in walking  R26.2 719.7   4. Decreased activities of daily living (ADL)  Z78.9 V49.89     Patient Active Problem List   Diagnosis   • Pneumonia   • Iron deficiency anemia   • Anemia   • CHF (congestive heart failure) (HCC)   • Acute systolic heart failure (CMS/HCC)   • Cytokine release syndrome, grade 1   • Cytokine release syndrome, grade 2   • Expressive aphasia   • Dysphagia   • Dysarthria     Past Medical History:   Diagnosis Date   • Arthritis    • CHF (congestive heart failure) (HCC)    • Hypertension      Past Surgical History:   Procedure Laterality Date   • COLONOSCOPY     • COLONOSCOPY N/A 2022    Procedure: COLONOSCOPY WITH POLYPECTOMIES;  Surgeon: Min Campbell MD;  Location: [facility] ENDOSCOPY;  Service: Gastroenterology;  Laterality: N/A;  COLON POLYPS, DIVERTICULOSIS   • ENDOSCOPY N/A 2022    Procedure: ESOPHAGOGASTRODUODENOSCOPY WITH BIOPSIES;  Surgeon: Min Campbell MD;  Location: [facility] ENDOSCOPY;  Service: Gastroenterology;  Laterality: N/A;  HIATAL HERNIA   • HEMORRHOIDECTOMY        General Information     Row Name 22 1028          OT Time and Intention    Document Type evaluation  -AC     Mode of Treatment individual therapy;occupational therapy  -AC     Row Name 22 1028          General Information    Patient Profile Reviewed yes  -AC     Prior Level of Function independent:;all household mobility;community mobility;ADL's;driving  -AC     Existing Precautions/Restrictions fall  -AC     Barriers to Rehab none identified  -AC     Row Name 22 1028          Occupational Profile    Reason for Services/Referral (Occupational Profile) Pt. is a 67year old male admitted for the above diagnosis. Pt. referred  to OT services to assess independence with ADLs and adl transfers/fx'l mobility. No previous OT services for current condition.  -     Row Name 09/11/22 1028          Living Environment    People in Home spouse  -[facility] Name 09/11/22 1028          Cognition    Orientation Status (Cognition) oriented x 3  -[facility] Name 09/11/22 1028          Safety Issues, Functional Mobility    Impairments Affecting Function (Mobility) balance;endurance/activity tolerance;strength;coordination;pain  -           User Key  (r) = Recorded By, (t) = Taken By, (c) = Cosigned By    Initials Name Provider Type    Lakesha Khan OT Occupational Therapist                 Mobility/ADL's     Row Name 09/11/22 1036          Bed Mobility    Bed Mobility bed mobility (all) activities  -     All Activities, [place] (Bed Mobility) standby assist;contact guard  -     Assistive Device (Bed Mobility) head of bed elevated  -[facility] Name 09/11/22 1036          Transfers    Transfers sit-stand transfer;stand-sit transfer  -     Sit-Stand [place] (Transfers) contact guard;1 person assist;verbal cues  -     Stand-Sit [place] (Transfers) contact guard;verbal cues  -[facility] Name 09/11/22 1036          Sit-Stand Transfer    Comment, (Sit-Stand Transfer) patient was CGA with sit to/from stand with use of bed rail  -[facility] Name 09/11/22 1036          Functional Mobility    Functional Mobility- Ind. Level contact guard assist;1 person  -     Functional Mobility- Comment patient was CGA with support for approx 5 steps at EOB.  -[facility] Name 09/11/22 1036          Activities of Daily Living    BADL Assessment/Intervention --  patient is min A for bathing/dressing, min A for grooming, min A for toileting, min/SBA for self-feeding.  -           User Key  (r) = Recorded By, (t) = Taken By, (c) = Cosigned By    Initials Name Provider Type    Lakesha Khan OT Occupational Therapist                Obj/Interventions     Row Name 09/11/22 1038          Sensory Assessment (Somatosensory)    Sensory Assessment (Somatosensory) sensation intact  -AC     Row Name 09/11/22 1038          Vision Assessment/Intervention    Visual Impairment/Limitations WFL  -[facility] 09/11/22 1038          Range of Motion Comprehensive    General Range of Motion bilateral upper extremity ROM WFL  -AC     Row Name 09/11/22 1038          Strength Comprehensive (MMT)    Comment, General Manual Muscle Testing (MMT) Assessment 4-/5 BUE  -AC     Row Name 09/11/22 1038          Motor Skills    Motor Skills coordination;functional endurance  -     Coordination fine motor deficit;gross motor deficit;bilateral;upper extremity;minimal impairment  -     Functional Endurance fair  -AC     Row Name 09/11/22 1038          Balance    Balance Assessment standing dynamic balance  -     Dynamic Standing Balance verbal cues;contact guard  -     Position/Device Used, Standing Balance supported  -           User Key  (r) = Recorded By, (t) = Taken By, (c) = Cosigned By    Initials Name Provider Type    AC Lakesha Bright OT Occupational Therapist               Goals/Plan     Row Name 09/11/22 1042          Transfer Goal 1 (OT)    Activity/Assistive Device (Transfer Goal 1, OT) transfers, all  -AC     [place] Level/Cues Needed (Transfer Goal 1, OT) modified independence  -AC     Time Frame (Transfer Goal 1, OT) long term goal (LTG);10 days  -AC     Row Name 09/11/22 1042          Bathing Goal 1 (OT)    Activity/Device (Bathing Goal 1, OT) bathing skills, all  -AC     [place] Level/Cues Needed (Bathing Goal 1, OT) modified independence  -AC     Time Frame (Bathing Goal 1, OT) long term goal (LTG);10 days  -AC     Row Name 09/11/22 1042          Dressing Goal 1 (OT)    Activity/Device (Dressing Goal 1, OT) dressing skills, all  -AC     [place]/Cues Needed (Dressing Goal 1, OT) modified independence  -AC     Time Frame (Dressing  Goal 1, OT) long term goal (LTG);10 days  -AC     Row Name 09/11/22 1042          Toileting Goal 1 (OT)    Activity/Device (Toileting Goal 1, OT) toileting skills, all  -AC     [place] Level/Cues Needed (Toileting Goal 1, OT) modified independence  -AC     Time Frame (Toileting Goal 1, OT) long term goal (LTG);10 days  -AC     Row Name 09/11/22 1042          Grooming Goal 1 (OT)    Activity/Device (Grooming Goal 1, OT) grooming skills, all  -AC     [place] (Grooming Goal 1, OT) modified independence  -AC     Time Frame (Grooming Goal 1, OT) long term goal (LTG);10 days  -AC     Row Name 09/11/22 1042          Self-Feeding Goal 1 (OT)    Activity/Device (Self-Feeding Goal 1, OT) self-feeding skills, all  -AC     [place] Level/Cues Needed (Self-Feeding Goal 1, OT) modified independence  -AC     Time Frame (Self-Feeding Goal 1, OT) long term goal (LTG);10 days  -AC     Row Name 09/11/22 1042          Strength Goal 1 (OT)    Strength Goal 1 (OT) Patient will improve bilateral upper extremity strength to 5/5  -AC     Time Frame (Strength Goal 1, OT) long term goal (LTG);10 days  -AC     Row Name 09/11/22 1042          Problem Specific Goal 1 (OT)    Problem Specific Goal 1 (OT) Patient will improve endurance to good for ADLs.  -AC     Time Frame (Problem Specific Goal 1, OT) long term goal (LTG);10 days  -AC     Row Name 09/11/22 1042          Therapy Assessment/Plan (OT)    Planned Therapy Interventions (OT) activity tolerance training;BADL retraining;functional balance retraining;occupation/activity based interventions;patient/caregiver education/training;ROM/therapeutic exercise;transfer/mobility retraining  -AC           User Key  (r) = Recorded By, (t) = Taken By, (c) = Cosigned By    Initials Name Provider Type    AC Lakesha Bright OT Occupational Therapist               Clinical Impression     Row Name 09/11/22 1039          Pain Assessment    Additional Documentation Pain Scale: FACES  Pre/Post-Treatment (Group)  -     Row Name 09/11/22 1039          Pain Scale: FACES Pre/Post-Treatment    Pain: FACES Scale, Pretreatment 2-->hurts little bit  -AC     Posttreatment Pain Rating 2-->hurts little bit  -AC     Pain Location generalized  -     Pre/Posttreatment Pain Comment patient states his back and shoulder pain is chronic  -     Row Name 09/11/22 1039          Plan of Care Review    Plan of Care Reviewed With patient;spouse  -     Progress no change  -     Outcome Evaluation Patient presents with limitations that impede his/her ability to perform ADLS. The skills of a therapist are necessary to maximize independence with ADLs.  Recommend skilled nursing facility.  -     Row Name 09/11/22 1039          Therapy Assessment/Plan (OT)    Patient/Family Therapy Goal Statement (OT) Patient would like to maximize independence with adls.  -     Rehab Potential (OT) good, to achieve stated therapy goals  -     Criteria for Skilled Therapeutic Interventions Met (OT) yes;meets criteria;skilled treatment is necessary  -     Therapy Frequency (OT) 5 times/wk  -     Row Name 09/11/22 1039          Therapy Plan Review/Discharge Plan (OT)    Equipment Needs Upon Discharge (OT) walker, rolling;commode chair  -     Anticipated Discharge Disposition (OT) skilled nursing facility  -     Row Name 09/11/22 1039          Positioning and Restraints    Pre-Treatment Position in bed  -     Post Treatment Position bed  -AC     In Bed fowlers;call light within reach;encouraged to call for assist;exit alarm on;with family/caregiver  -           User Key  (r) = Recorded By, (t) = Taken By, (c) = Cosigned By    Initials Name Provider Type    AC Lakesha Bright, DUGLAS Occupational Therapist               Outcome Measures     Row Name 09/11/22 1043          How much help from another is currently needed...    Putting on and taking off regular lower body clothing? 3  -AC     Bathing (including washing, rinsing,  and drying) 3  -AC     Toileting (which includes using toilet bed pan or urinal) 3  -AC     Putting on and taking off regular upper body clothing 3  -AC     Taking care of personal grooming (such as brushing teeth) 3  -AC     Eating meals 3  -AC     AM-PAC 6 Clicks Score (OT) 18  -AC     Row Name 09/11/22 0959 09/11/22 0831       How much help from another person do you currently need...    Turning from your back to your side while in flat bed without using bedrails? 3  -DW 4  -BG    Moving from lying on back to sitting on the side of a flat bed without bedrails? 3  -DW 4  -BG    Moving to and from a bed to a chair (including a wheelchair)? 3  -DW 3  -BG    Standing up from a chair using your arms (e.g., wheelchair, bedside chair)? 3  -DW 3  -BG    Climbing 3-5 steps with a railing? 2  -DW 3  -BG    To walk in hospital room? 3  -DW 3  -BG    AM-PAC 6 Clicks Score (PT) 17  -DW 20  -BG    Highest level of mobility 5 --> Static standing  -DW 6 --> Walked 10 steps or more  -BG    Row Name 09/11/22 1043 09/11/22 0959       Functional Assessment    Outcome Measure Options AM-PAC 6 Clicks Daily Activity (OT);Optimal Instrument  -AC AM-PAC 6 Clicks Basic Mobility (PT)  -DW    Row Name 09/11/22 1043          Optimal Instrument    Optimal Instrument Optimal - 3  -AC     Bending/Stooping 2  -AC     Standing 2  -AC     Reaching 2  -AC     From the list, choose the 3 activities you would most like to be able to do without any difficulty Standing;Reaching;Bending/stooping  -AC     Total Score Optimal - 3 6  -AC           User Key  (r) = Recorded By, (t) = Taken By, (c) = Cosigned By    Initials Name Provider Type    Bridgette Lopez, RN Registered Nurse    Lakesha Khan, OT Occupational Therapist    Paul Guo, PT Physical Therapist                Occupational Therapy Education                 Title: PT OT SLP Therapies (Done)     Topic: Occupational Therapy (Done)     Point: ADL training (Done)     Description:    Instruct learner(s) on proper safety adaptation and remediation techniques during self care or transfers.   Instruct in proper use of assistive devices.              Learning Progress Summary           Patient Acceptance, E, VU by  at 9/11/2022 1046   Family Acceptance, E, VU by  at 9/11/2022 1046                   Point: Home exercise program (Done)     Description:   Instruct learner(s) on appropriate technique for monitoring, assisting and/or progressing therapeutic exercises/activities.              Learning Progress Summary           Patient Acceptance, E, VU by  at 9/11/2022 1046   Family Acceptance, E, VU by  at 9/11/2022 1046                   Point: Precautions (Done)     Description:   Instruct learner(s) on prescribed precautions during self-care and functional transfers.              Learning Progress Summary           Patient Acceptance, E, VU by  at 9/11/2022 1046   Family Acceptance, E, VU by  at 9/11/2022 1046                   Point: Body mechanics (Done)     Description:   Instruct learner(s) on proper positioning and spine alignment during self-care, functional mobility activities and/or exercises.              Learning Progress Summary           Patient Acceptance, E, VU by  at 9/11/2022 1046   Family Acceptance, E, VU by  at 9/11/2022 1046                               User Key     Initials Effective Dates Name Provider Type Discipline     06/16/21 -  Lakesha Bright OT Occupational Therapist OT              OT Recommendation and Plan  Planned Therapy Interventions (OT): activity tolerance training, BADL retraining, functional balance retraining, occupation/activity based interventions, patient/caregiver education/training, ROM/therapeutic exercise, transfer/mobility retraining  Therapy Frequency (OT): 5 times/wk  Plan of Care Review  Plan of Care Reviewed With: patient, spouse  Progress: no change  Outcome Evaluation: Patient presents with limitations that impede his/her  ability to perform ADLS. The skills of a therapist are necessary to maximize independence with ADLs.  Recommend skilled nursing facility.     Time Calculation:    Time Calculation- OT     Row Name 09/11/22 1047 09/11/22 1000          Time Calculation- OT    OT Received On 09/11/22  -AC --     OT Goal Re-Cert Due Date 09/20/22  -AC --            Timed Charges    92490 - Gait Training Minutes  -- 8  -DW            Untimed Charges    OT Eval/Re-eval Minutes 32  -AC --            Total Minutes    Timed Charges Total Minutes -- 8  -DW     Untimed Charges Total Minutes 32  -AC --      Total Minutes 32  -AC 8  -DW           User Key  (r) = Recorded By, (t) = Taken By, (c) = Cosigned By    Initials Name Provider Type    AC Lakesha Bright OT Occupational Therapist    Paul Guo, MARIAH Physical Therapist              Therapy Charges for Today     Code Description Service Date Service Provider Modifiers Qty    [id number] HC OT EVAL LOW COMPLEXITY 3 9/11/2022 Lakesha Bright OT GO 1               Lakesha Bright OT  9/11/2022

## 2022-09-11 NOTE — PROGRESS NOTES
[facility]   Hospitalist Progress Note  Date: 2022  Patient Name: Hai Gold  : 1955  MRN: [id number]  Date of admission: 9/10/2022      Subjective   Subjective     Chief Complaint: Stuttering of the speech, expressive aphasia, myoclonic jerking involving jaw and upper extremity and difficulty swallowing started on day of admission.    Summary:   Hai Gold is a 67 y.o. male with past medical history of A. fib recently started on Eliquis by Dr. Matheus Urrutia, systolic CHF  , COVID-19 infection , hypertension, rheumatoid arthritis on Enbrel and methotrexate by Dr. zavaleta and history of difficulty swallowing as well as with Dr. Campbell in March and recommended back placement refused by patient and the family.  Patient woke up with slurred speech and difficulty swallowing especially water felt like lump in his throat.  This difficulty is different than his baseline as per family at bedside.  Difficulty swallowing has improved since.  Still has slurred speech and difficulty finding right words.  Patient denies any headache or new vision problem.  No new weakness in arms or legs.  There is chronic numbness of left leg.  Also noticed jerking and jaw movement starting today.  Patient also complains of intermittent chest discomfort and shortness of air along with dizziness which has been worked up extensively by his cardiologist in the past.     Interval Followup:   Vital signs stable on room air  State speech is better although still some stuttering.  Dysphagia also improving.  Lump in throat improving.  Occasional jerking of upper extremities  Still unsteady per wife at bedside.  No other complaints    Review of Systems   All systems were reviewed and negative except for: Summary and interval follow-up    Objective   Objective     Vitals:   Temp:  [97.3 °F (36.3 °C)-98.9 °F (37.2 °C)] 98.4 °F (36.9 °C)  Heart Rate:  [49-77] 52  Resp:  [18-22] 20  BP: ()/(56-99)  114/65  Physical Exam    Constitutional: Awake, alert, no acute distress   Eyes: Pupils equal, sclerae anicteric, no conjunctival injection   HENT: NCAT, mucous membranes moist   Neck: Supple, no thyromegaly, no lymphadenopathy, trachea midline   Respiratory: Clear to auscultation bilaterally, nonlabored respirations    Cardiovascular: Irregularly irregular, no murmurs, rubs, or gallops, palpable pedal pulses bilaterally   Gastrointestinal: Positive bowel sounds, soft, nontender, nondistended   Musculoskeletal: No bilateral ankle edema, no clubbing or cyanosis to extremities   Psychiatric: Appropriate affect, cooperative   Neurologic: Oriented x 3, strength symmetric in all extremities, Cranial Nerves grossly intact to confrontation, speech clear   Skin: No rashes     Result Review    Result Review:  I have personally reviewed the results from the time of this admission to 9/11/2022 14:22 EDT and agree with these findings:  [x]  Laboratory  []  Microbiology  [x]  Radiology  [x]  EKG/Telemetry   []  Cardiology/Vascular   []  Pathology  [x]  Old records  [x]  Other: Medications    Assessment & Plan   Assessment / Plan     Assessment:     Slurred speech/expressive aphasia/stuttering.  Improving  Intermittent difficulty swallowing.  S/p PEG and modified barium swallow in March.  Improving  Permanent A. fib on anticoagulation with Eliquis.  Acute on chronic systolic CHF..  Rheumatoid arthritis on DMA RD.  Immunocompromise due to above.  Hypertension.  Cachexia.   Hyperlipidemia LDL of 80.    Plan:  Continue home Eliquis.  Per patient is off aspirin.  Continue high-dose statin.  Await official report of MRI brain.  Await 2D echo.  Discussed with neurology.  No evidence of stroke or MS.  Patient cleared by neurology to be released and further work-up as an outpatient.  Change IV Lasix to home p.o. Lasix.  Continue home metoprolol.  PT OT.  Speech therapy.      Discussed plan with RN.  Patient and wife wants him to go to  inpatient rehab prefers SNF at [facility].  Discharge when bed available.    DVT prophylaxis:  Medical and mechanical DVT prophylaxis orders are present.    CODE STATUS:   Code Status (Patient has no pulse and is not breathing): CPR (Attempt to Resuscitate)  Medical Interventions (Patient has pulse or is breathing): Full Support  Release to patient: Routine Release      Part of this note may be an electronic transcription/translation of spoken language to printed text using the Dragon Dictation System.     Electronically signed by Júnior Hickman MD, 09/11/22, 2:22 PM EDT.

## 2022-09-11 NOTE — PROGRESS NOTES
[facility]   Neurology Progress Note    Patient Name: Hai Gold  : 1955  MRN: [id number]  Primary Care Physician:  Shelly Mejia MD  Referring Physician: No ref. provider found  Date of admission: 9/10/2022    Subjective   Subjective     Reason for Consult/ Chief Complaint: Follow-up visit for his stuttering, slurring of speech, weakness, myoclonic jerks    HPI:  Hai Gold is a 67 y.o. male follow-up visit.  He states that is better.  He is eating his lunch at this time.  He has no problems swallowing.  He states that his speech is less slurred.  His wife is with him today.  His wife wants him to undergo rehab because of the lower extremity weakness.    He has had previous CPKs which were unremarkable.    I reviewed MRI of the brain which is unremarkable.        Objective     Vitals:   Temp:  [97.3 °F (36.3 °C)-98.9 °F (37.2 °C)] 98.4 °F (36.9 °C)  Heart Rate:  [49-77] 52  Resp:  [18-22] 20  BP: ()/(56-99) 114/65    Physical Exam: He is alert, fluent, phasic, follows commands.  He is able to maneuver his utensils and eat independently.  He is able to eat and swallow his meal of shredded pot roast, potatoes and gravy  without any difficulty.    Result Review    Result Review:  I have personally reviewed the results from the time of this admission to 2022 14:35 EDT and agree with these findings:  [x]  Laboratory  []  Microbiology  [x]  Radiology  []  EKG/Telemetry   []  Cardiology/Vascular   []  Pathology  [x]  Old records  []  Other:      Assessment & Plan   Assessment / Plan   Active Hospital Problems:  Active Hospital Problems    Diagnosis    • Expressive aphasia    • Dysphagia    • Dysarthria          Plan: He is to undergo rehabilitation.  I will do a work-up to see if there is a possibility that he has a paraneoplastic syndrome causing to have regular speech, weakness of his upper and lower extremities.  Will be followed in my clinic in the next 2 to 4 weeks  for nerve conduction EMG study.    Total time spent with the patient and coordinating patient care was 25 minutes.    electronically signed by Douglas Barkley MD, 09/11/22, 2:35 PM EDT.

## 2022-09-11 NOTE — PLAN OF CARE
Problem: Adult Inpatient Plan of Care  Goal: Plan of Care Review  Outcome: Ongoing, Progressing  Goal: Patient-Specific Goal (Individualized)  Outcome: Ongoing, Progressing  Goal: Absence of Hospital-Acquired Illness or Injury  Outcome: Ongoing, Progressing  Intervention: Identify and Manage Fall Risk  Recent Flowsheet Documentation  Taken 9/10/2022 2000 by Geeta Nichole RN  Safety Promotion/Fall Prevention: safety round/check completed  Intervention: Prevent and Manage VTE (Venous Thromboembolism) Risk  Recent Flowsheet Documentation  Taken 9/10/2022 2000 by Geeta Nichole RN  Activity Management: activity adjusted per tolerance  Intervention: Prevent Infection  Recent Flowsheet Documentation  Taken 9/10/2022 2000 by Geeta Nichole RN  Infection Prevention: rest/sleep promoted  Goal: Optimal Comfort and Wellbeing  Outcome: Ongoing, Progressing  Intervention: Provide Person-Centered Care  Recent Flowsheet Documentation  Taken 9/10/2022 2000 by Geeta Nichole RN  Trust Relationship/Rapport:   care explained   empathic listening provided   questions answered   questions encouraged   emotional support provided  Goal: Readiness for Transition of Care  Outcome: Ongoing, Progressing     Problem: Fall Injury Risk  Goal: Absence of Fall and Fall-Related Injury  Outcome: Ongoing, Progressing  Intervention: Identify and Manage Contributors  Recent Flowsheet Documentation  Taken 9/10/2022 2000 by Geeta Nichole RN  Medication Review/Management: medications reviewed  Intervention: Promote Injury-Free Environment  Recent Flowsheet Documentation  Taken 9/10/2022 2000 by Geeta Nichole RN  Safety Promotion/Fall Prevention: safety round/check completed     Problem: Skin Injury Risk Increased  Goal: Skin Health and Integrity  Outcome: Ongoing, Progressing     Problem: Heart Failure Comorbidity  Goal: Maintenance of Heart Failure Symptom Control  Outcome: Ongoing, Progressing  Intervention: Maintain Heart  Failure-Management  Recent Flowsheet Documentation  Taken 9/10/2022 2000 by Geeta Nichole RN  Medication Review/Management: medications reviewed     Problem: Hypertension Comorbidity  Goal: Blood Pressure in Desired Range  Outcome: Ongoing, Progressing  Intervention: Maintain Blood Pressure Management  Recent Flowsheet Documentation  Taken 9/10/2022 2000 by Geeta Nichole RN  Medication Review/Management: medications reviewed     Problem: Adjustment to Illness (Stroke, Ischemic/Transient Ischemic Attack)  Goal: Optimal Coping  Outcome: Ongoing, Progressing     Problem: Bowel Elimination Impaired (Stroke, Ischemic/Transient Ischemic Attack)  Goal: Effective Bowel Elimination  Outcome: Ongoing, Progressing     Problem: Cerebral Tissue Perfusion (Stroke, Ischemic/Transient Ischemic Attack)  Goal: Optimal Cerebral Tissue Perfusion  Outcome: Ongoing, Progressing     Problem: Cognitive Impairment (Stroke, Ischemic/Transient Ischemic Attack)  Goal: Optimal Cognitive Function  Outcome: Ongoing, Progressing     Problem: Communication Impairment (Stroke, Ischemic/Transient Ischemic Attack)  Goal: Improved Communication Skills  Outcome: Ongoing, Progressing  Intervention: Optimize Communication Skills  Recent Flowsheet Documentation  Taken 9/10/2022 2000 by Geeta Nichole RN  Communication Enhancement Strategies:   call light answered in person   extra time allowed for response     Problem: Functional Ability Impaired (Stroke, Ischemic/Transient Ischemic Attack)  Goal: Optimal Functional Ability  Outcome: Ongoing, Progressing  Intervention: Optimize Functional Ability  Recent Flowsheet Documentation  Taken 9/10/2022 2000 by Geeta Nichole RN  Activity Management: activity adjusted per tolerance     Problem: Respiratory Compromise (Stroke, Ischemic/Transient Ischemic Attack)  Goal: Effective Oxygenation and Ventilation  Outcome: Ongoing, Progressing     Problem: Sensorimotor Impairment (Stroke, Ischemic/Transient Ischemic  Attack)  Goal: Improved Sensorimotor Function  Outcome: Ongoing, Progressing     Problem: Swallowing Impairment (Stroke, Ischemic/Transient Ischemic Attack)  Goal: Optimal Eating and Swallowing without Aspiration  Outcome: Ongoing, Progressing     Problem: Urinary Elimination Impaired (Stroke, Ischemic/Transient Ischemic Attack)  Goal: Effective Urinary Elimination  Outcome: Ongoing, Progressing   Goal Outcome Evaluation:

## 2022-09-11 NOTE — PLAN OF CARE
Goal Outcome Evaluation:  Plan of Care Reviewed With: patient        Progress: improving  Outcome Evaluation: Pt presents with decreased strength and endurance secondary to prolonged hospitalization.  Skilled PT intervention is needed to address noted deficits in order to restore to PLOF.

## 2022-09-11 NOTE — THERAPY EVALUATION
Acute Care - Physical Therapy Initial Evaluation  [facility]     Patient Name: Hai Gold  : 1955  MRN: [id number]  Today's Date: 2022   Onset of Illness/Injury or Date of Surgery: 09/10/22  Visit Dx:     ICD-10-CM ICD-9-CM   1. Dysarthria  R47.1 784.51   2. Aphasia  R47.01 784.3   3. Difficulty in walking  R26.2 719.7     Patient Active Problem List   Diagnosis   • Pneumonia   • Iron deficiency anemia   • Anemia   • CHF (congestive heart failure) (HCC)   • Acute systolic heart failure (CMS/HCC)   • Cytokine release syndrome, grade 1   • Cytokine release syndrome, grade 2   • Expressive aphasia   • Dysphagia   • Dysarthria     Past Medical History:   Diagnosis Date   • Arthritis    • CHF (congestive heart failure) (HCC)    • Hypertension      Past Surgical History:   Procedure Laterality Date   • COLONOSCOPY     • COLONOSCOPY N/A 2022    Procedure: COLONOSCOPY WITH POLYPECTOMIES;  Surgeon: Min Campbell MD;  Location: [facility] ENDOSCOPY;  Service: Gastroenterology;  Laterality: N/A;  COLON POLYPS, DIVERTICULOSIS   • ENDOSCOPY N/A 2022    Procedure: ESOPHAGOGASTRODUODENOSCOPY WITH BIOPSIES;  Surgeon: Min Campbell MD;  Location: [facility] ENDOSCOPY;  Service: Gastroenterology;  Laterality: N/A;  HIATAL HERNIA   • HEMORRHOIDECTOMY       PT Assessment (last 12 hours)     PT Evaluation and Treatment     Row Name 22 0922          Physical Therapy Time and Intention    Subjective Information complains of;fatigue  -DW     Document Type evaluation  -DW     Mode of Treatment individual therapy;physical therapy  -DW     Patient Effort good  -DW     Symptoms Noted During/After Treatment fatigue  -DW     Row Name 22 0950          General Information    Patient Profile Reviewed yes  -DW     Onset of Illness/Injury or Date of Surgery 09/10/22  -DW     Referring Physician Lazaro May  -DW     Patient Observations alert  -DW     Prior Level of Function independent:;ADL's  -DW      Equipment Currently Used at Home none  -DW     Risks Reviewed patient:  -DW     Benefits Reviewed patient:;improve function;increase independence;increase strength;increase balance  -DW     Row Name 09/11/22 0950          Previous Level of Function/Home Environm    BADLs, Premorbid Functional Level independent  -DW     IADLs, Premorbid Functional Level independent  -DW     Bed Mobility, Premorbid Functional Level independent  -DW     Transfers, Premorbid Functional Level independent  -DW     Household Ambulation, Premorbid Functional Level independent  -DW     Community Ambulation, Premorbid Functional Level independent  -DW     Row Name 09/11/22 0950          Living Environment    Current Living Arrangements home  -DW     People in Home spouse  -DW     Primary Care Provided by self  -DW     Row Name 09/11/22 0950          Home Use of Assistive/Adaptive Equipment    Equipment Currently Used at Home none  -DW     Row Name 09/11/22 0950          Cognition    Affect/Mental Status (Cognition) WNL  -DW     Orientation Status (Cognition) oriented x 3  -DW     Follows Commands (Cognition) WNL  -DW     Cognitive Function WNL  -DW     Row Name 09/11/22 0950          Range of Motion Comprehensive    General Range of Motion no range of motion deficits identified  -DW     Row Name 09/11/22 0950          Strength Comprehensive (MMT)    General Manual Muscle Testing (MMT) Assessment lower extremity strength deficits identified  -DW     Comment, General Manual Muscle Testing (MMT) Assessment 4-/5  -DW     Row Name 09/11/22 0950          Bed Mobility    Bed Mobility bed mobility (all) activities  -DW     All Activities, [place] (Bed Mobility) contact guard  -DW     Bed Mobility, Safety Issues decreased use of legs for bridging/pushing  -DW     Assistive Device (Bed Mobility) bed rails;head of bed elevated  -     Row Name 09/11/22 0950          Transfers    Transfers sit-stand transfer;stand-sit transfer  -DW     Sit-Stand  [place] (Transfers) contact guard  -DW     Stand-Sit [place] (Transfers) contact guard  -DW     Row Name 09/11/22 0950          Sit-Stand Transfer    Assistive Device (Sit-Stand Transfers) walker, front-wheeled  -DW     Row Name 09/11/22 0950          Stand-Sit Transfer    Assistive Device (Stand-Sit Transfers) walker, front-wheeled  -DW     Row Name 09/11/22 0950          Gait/Stairs (Locomotion)    Gait/Stairs Locomotion gait/ambulation independence;gait/ambulation assistive device;distance ambulated  -     [place] Level (Gait) contact guard  -DW     Assistive Device (Gait) walker, front-wheeled  -DW     Distance in Feet (Gait) 30  -DW     Row Name 09/11/22 0950          Safety Issues, Functional Mobility    Impairments Affecting Function (Mobility) balance;endurance/activity tolerance;strength  -DW     Row Name 09/11/22 0950          Balance    Balance Assessment standing dynamic balance  -DW     Dynamic Standing Balance contact guard  -DW     Position/Device Used, Standing Balance walker, front-wheeled  -DW     Row Name 09/11/22 0950          Plan of Care Review    Plan of Care Reviewed With patient  -     Progress improving  -     Outcome Evaluation Pt presents with decreased strength and endurance secondary to prolonged hospitalization.  Skilled PT intervention is needed to address noted deficits in order to restore to PLOF.  -     Row Name 09/11/22 0950          Therapy Assessment/Plan (PT)    PT Diagnosis (PT) Difficulty in walking  -     Rehab Potential (PT) good, to achieve stated therapy goals  -     Criteria for Skilled Interventions Met (PT) yes;meets criteria;skilled treatment is necessary  -     Therapy Frequency (PT) daily  -     Problem List (PT) problems related to;balance;strength  -DW     Activity Limitations Related to Problem List (PT) unable to ambulate safely;unable to transfer safely;BADLs not performed adequately or safely;IADLs not performed adequately or  safely  -DW     Row Name 09/11/22 0950          PT Evaluation Complexity    History, PT Evaluation Complexity 1-2 personal factors and/or comorbidities  -DW     Examination of Body Systems (PT Eval Complexity) 1-2 elements  -DW     Clinical Presentation (PT Evaluation Complexity) stable  -DW     Clinical Decision Making (PT Evaluation Complexity) low complexity  -DW     Overall Complexity (PT Evaluation Complexity) low complexity  -DW     Row Name 09/11/22 0950          Therapy Plan Review/Discharge Plan (PT)    Therapy Plan Review (PT) evaluation/treatment results reviewed  -     Row Name 09/11/22 0950          Physical Therapy Goals    Bed Mobility Goal Selection (PT) bed mobility, PT goal 1  -DW     Gait Training Goal Selection (PT) gait training, PT goal 1  -DW     Row Name 09/11/22 0950          Bed Mobility Goal 1 (PT)    Activity/Assistive Device (Bed Mobility Goal 1, PT) bed mobility activities, all  -DW     [place] Level/Cues Needed (Bed Mobility Goal 1, PT) independent  -DW     Time Frame (Bed Mobility Goal 1, PT) long term goal (LTG);10 days  -     Row Name 09/11/22 0950          Gait Training Goal 1 (PT)    Activity/Assistive Device (Gait Training Goal 1, PT) gait (walking locomotion)  -DW     [place] Level (Gait Training Goal 1, PT) independent  -DW     Distance (Gait Training Goal 1, PT) 300  -DW     Time Frame (Gait Training Goal 1, PT) long term goal (LTG);10 days  -DW           User Key  (r) = Recorded By, (t) = Taken By, (c) = Cosigned By    Initials Name Provider Type    Paul Guo PT Physical Therapist                Physical Therapy Education                 Title: PT OT SLP Therapies (Done)     Topic: Physical Therapy (Done)     Point: Mobility training (Done)     Learning Progress Summary           Patient Acceptance, E,TB, VU by AYDE at 9/11/2022 0959                   Point: Home exercise program (Done)     Learning Progress Summary           Patient Acceptance, E,TB, VU  by AYDE at 9/11/2022 0959                   Point: Body mechanics (Done)     Learning Progress Summary           Patient Acceptance, E,TB, VU by AYDE at 9/11/2022 0959                   Point: Precautions (Done)     Learning Progress Summary           Patient Acceptance, E,TB, VU by AYDE at 9/11/2022 0959                               User Key     Initials Effective Dates Name Provider Type Discipline    AYDE 04/25/21 -  Paul Bryant, PT Physical Therapist PT              PT Recommendation and Plan  Anticipated Discharge Disposition (PT): home with home health, home with /7 care  Planned Therapy Interventions (PT): balance training, gait training, strengthening, transfer training  Therapy Frequency (PT): daily  Plan of Care Reviewed With: patient  Progress: improving  Outcome Evaluation: Pt presents with decreased strength and endurance secondary to prolonged hospitalization.  Skilled PT intervention is needed to address noted deficits in order to restore to PLOF.   Outcome Measures     Row Name 09/11/22 0959             How much help from another person do you currently need...    Turning from your back to your side while in flat bed without using bedrails? 3  -DW      Moving from lying on back to sitting on the side of a flat bed without bedrails? 3  -DW      Moving to and from a bed to a chair (including a wheelchair)? 3  -DW      Standing up from a chair using your arms (e.g., wheelchair, bedside chair)? 3  -DW      Climbing 3-5 steps with a railing? 2  -DW      To walk in hospital room? 3  -DW      AM-PAC 6 Clicks Score (PT) 17  -DW              Functional Assessment    Outcome Measure Options AM-PAC 6 Clicks Basic Mobility (PT)  -DW            User Key  (r) = Recorded By, (t) = Taken By, (c) = Cosigned By    Initials Name Provider Type    Paul Guo, MARIAH Physical Therapist                 Time Calculation:    PT Charges     Row Name 09/11/22 1000             Time Calculation    PT Received On 09/11/22  -AYDE       PT Goal Re-Cert Due Date 09/20/22  -DW              Timed Charges    92386 - Gait Training Minutes  8  -DW              Untimed Charges    PT Eval/Re-eval Minutes 15  -DW              Total Minutes    Timed Charges Total Minutes 8  -DW      Untimed Charges Total Minutes 15  -DW       Total Minutes 23  -DW            User Key  (r) = Recorded By, (t) = Taken By, (c) = Cosigned By    Initials Name Provider Type    DW Paul Bryant, PT Physical Therapist              Therapy Charges for Today     Code Description Service Date Service Provider Modifiers Qty    95708734919 HC GAIT TRAINING EA 15 MIN 9/11/2022 Paul Bryant, PT GP 1    75837582355 HC PT EVAL LOW COMPLEXITY 2 9/11/2022 Paul Bryant, PT GP 1          PT G-Codes  Outcome Measure Options: AM-PAC 6 Clicks Basic Mobility (PT)  AM-PAC 6 Clicks Score (PT): 17    Paul Bryant PT  9/11/2022

## 2022-09-12 ENCOUNTER — HOSPITAL ENCOUNTER (INPATIENT)
Facility: HOSPITAL | Age: 67
LOS: 11 days | Discharge: HOME | End: 2022-09-23
Attending: INTERNAL MEDICINE | Admitting: INTERNAL MEDICINE

## 2022-09-12 VITALS
HEART RATE: 64 BPM | SYSTOLIC BLOOD PRESSURE: 107 MMHG | OXYGEN SATURATION: 98 % | RESPIRATION RATE: 18 BRPM | DIASTOLIC BLOOD PRESSURE: 61 MMHG | TEMPERATURE: 97.6 F | BODY MASS INDEX: 20.35 KG/M2 | HEIGHT: 68 IN | WEIGHT: 134.26 LBS

## 2022-09-12 DIAGNOSIS — R26.2 DIFFICULTY WALKING: ICD-10-CM

## 2022-09-12 DIAGNOSIS — Z78.9 DECREASED ACTIVITIES OF DAILY LIVING (ADL): Primary | ICD-10-CM

## 2022-09-12 DIAGNOSIS — R13.12 DYSPHAGIA, OROPHARYNGEAL: ICD-10-CM

## 2022-09-12 DIAGNOSIS — R53.1 GENERALIZED WEAKNESS: ICD-10-CM

## 2022-09-12 PROCEDURE — 86255 FLUORESCENT ANTIBODY SCREEN: CPT | Performed by: INTERNAL MEDICINE

## 2022-09-12 PROCEDURE — G0378 HOSPITAL OBSERVATION PER HR: HCPCS

## 2022-09-12 PROCEDURE — 97110 THERAPEUTIC EXERCISES: CPT

## 2022-09-12 PROCEDURE — 97116 GAIT TRAINING THERAPY: CPT

## 2022-09-12 PROCEDURE — 97530 THERAPEUTIC ACTIVITIES: CPT

## 2022-09-12 PROCEDURE — 86596 VOLTAGE-GTD CA CHNL ANTB EA: CPT | Performed by: INTERNAL MEDICINE

## 2022-09-12 PROCEDURE — 92610 EVALUATE SWALLOWING FUNCTION: CPT

## 2022-09-12 RX ORDER — ALUMINA, MAGNESIA, AND SIMETHICONE 2400; 2400; 240 MG/30ML; MG/30ML; MG/30ML
7.5 SUSPENSION ORAL EVERY 4 HOURS PRN
Status: DISCONTINUED | OUTPATIENT
Start: 2022-09-12 | End: 2022-09-23 | Stop reason: HOSPADM

## 2022-09-12 RX ORDER — ACETAMINOPHEN 325 MG/1
650 TABLET ORAL EVERY 6 HOURS PRN
Status: DISCONTINUED | OUTPATIENT
Start: 2022-09-12 | End: 2022-09-12 | Stop reason: HOSPADM

## 2022-09-12 RX ORDER — FUROSEMIDE 40 MG/1
40 TABLET ORAL 2 TIMES DAILY
Status: CANCELLED | OUTPATIENT
Start: 2022-09-12

## 2022-09-12 RX ORDER — BISACODYL 10 MG
10 SUPPOSITORY, RECTAL RECTAL DAILY PRN
Status: CANCELLED | OUTPATIENT
Start: 2022-09-12

## 2022-09-12 RX ORDER — FOLIC ACID 1 MG/1
1 TABLET ORAL DAILY
Status: DISCONTINUED | OUTPATIENT
Start: 2022-09-13 | End: 2022-09-23 | Stop reason: HOSPADM

## 2022-09-12 RX ORDER — DOCUSATE SODIUM 100 MG/1
100 CAPSULE, LIQUID FILLED ORAL DAILY
Status: CANCELLED | OUTPATIENT
Start: 2022-09-13

## 2022-09-12 RX ORDER — ACETAMINOPHEN 325 MG/1
650 TABLET ORAL EVERY 6 HOURS PRN
Status: CANCELLED | OUTPATIENT
Start: 2022-09-12

## 2022-09-12 RX ORDER — SODIUM CHLORIDE 0.9 % (FLUSH) 0.9 %
10 SYRINGE (ML) INJECTION EVERY 12 HOURS SCHEDULED
Status: CANCELLED | OUTPATIENT
Start: 2022-09-12

## 2022-09-12 RX ORDER — ATORVASTATIN CALCIUM 40 MG/1
80 TABLET, FILM COATED ORAL NIGHTLY
Status: CANCELLED | OUTPATIENT
Start: 2022-09-12

## 2022-09-12 RX ORDER — DOCUSATE SODIUM 100 MG/1
100 CAPSULE, LIQUID FILLED ORAL DAILY
Status: DISCONTINUED | OUTPATIENT
Start: 2022-09-13 | End: 2022-09-18

## 2022-09-12 RX ORDER — FUROSEMIDE 40 MG/1
40 TABLET ORAL
Status: DISCONTINUED | OUTPATIENT
Start: 2022-09-12 | End: 2022-09-14

## 2022-09-12 RX ORDER — PANTOPRAZOLE SODIUM 40 MG/1
40 TABLET, DELAYED RELEASE ORAL DAILY
Status: CANCELLED | OUTPATIENT
Start: 2022-09-13

## 2022-09-12 RX ORDER — ALUMINA, MAGNESIA, AND SIMETHICONE 2400; 2400; 240 MG/30ML; MG/30ML; MG/30ML
7.5 SUSPENSION ORAL EVERY 4 HOURS PRN
Status: CANCELLED | OUTPATIENT
Start: 2022-09-12

## 2022-09-12 RX ORDER — ACETAMINOPHEN 325 MG/1
650 TABLET ORAL EVERY 6 HOURS PRN
Status: DISCONTINUED | OUTPATIENT
Start: 2022-09-12 | End: 2022-09-23 | Stop reason: HOSPADM

## 2022-09-12 RX ORDER — SODIUM CHLORIDE 0.9 % (FLUSH) 0.9 %
10 SYRINGE (ML) INJECTION EVERY 12 HOURS SCHEDULED
Status: DISCONTINUED | OUTPATIENT
Start: 2022-09-12 | End: 2022-09-23 | Stop reason: HOSPADM

## 2022-09-12 RX ORDER — ATORVASTATIN CALCIUM 40 MG/1
80 TABLET, FILM COATED ORAL NIGHTLY
Status: DISCONTINUED | OUTPATIENT
Start: 2022-09-12 | End: 2022-09-23 | Stop reason: HOSPADM

## 2022-09-12 RX ORDER — FUROSEMIDE 40 MG/1
40 TABLET ORAL 2 TIMES DAILY
Status: DISCONTINUED | OUTPATIENT
Start: 2022-09-12 | End: 2022-09-12

## 2022-09-12 RX ORDER — PANTOPRAZOLE SODIUM 40 MG/1
40 TABLET, DELAYED RELEASE ORAL DAILY
Status: DISCONTINUED | OUTPATIENT
Start: 2022-09-13 | End: 2022-09-23 | Stop reason: HOSPADM

## 2022-09-12 RX ORDER — FOLIC ACID 1 MG/1
1 TABLET ORAL DAILY
Status: CANCELLED | OUTPATIENT
Start: 2022-09-13

## 2022-09-12 RX ORDER — HYDROCODONE BITARTRATE AND ACETAMINOPHEN 5; 325 MG/1; MG/1
1 TABLET ORAL EVERY 4 HOURS PRN
Status: DISPENSED | OUTPATIENT
Start: 2022-09-12 | End: 2022-09-17

## 2022-09-12 RX ORDER — HYDROCODONE BITARTRATE AND ACETAMINOPHEN 5; 325 MG/1; MG/1
1 TABLET ORAL EVERY 4 HOURS PRN
Status: CANCELLED | OUTPATIENT
Start: 2022-09-12 | End: 2022-09-17

## 2022-09-12 RX ORDER — METOPROLOL SUCCINATE 25 MG/1
25 TABLET, EXTENDED RELEASE ORAL DAILY
Status: CANCELLED | OUTPATIENT
Start: 2022-09-13

## 2022-09-12 RX ORDER — METOPROLOL SUCCINATE 25 MG/1
25 TABLET, EXTENDED RELEASE ORAL DAILY
Status: DISCONTINUED | OUTPATIENT
Start: 2022-09-13 | End: 2022-09-14

## 2022-09-12 RX ORDER — BISACODYL 10 MG
10 SUPPOSITORY, RECTAL RECTAL DAILY PRN
Status: DISCONTINUED | OUTPATIENT
Start: 2022-09-12 | End: 2022-09-23 | Stop reason: HOSPADM

## 2022-09-12 RX ADMIN — FUROSEMIDE 40 MG: 40 TABLET ORAL at 18:55

## 2022-09-12 RX ADMIN — TUBERCULIN PURIFIED PROTEIN DERIVATIVE 5 UNITS: 5 INJECTION, SOLUTION INTRADERMAL at 20:36

## 2022-09-12 RX ADMIN — PANTOPRAZOLE SODIUM 40 MG: 40 TABLET, DELAYED RELEASE ORAL at 09:21

## 2022-09-12 RX ADMIN — DOCUSATE SODIUM 100 MG: 100 CAPSULE, LIQUID FILLED ORAL at 09:21

## 2022-09-12 RX ADMIN — FUROSEMIDE 40 MG: 40 TABLET ORAL at 09:21

## 2022-09-12 RX ADMIN — APIXABAN 5 MG: 5 TABLET, FILM COATED ORAL at 20:12

## 2022-09-12 RX ADMIN — ACETAMINOPHEN 650 MG: 325 TABLET ORAL at 16:33

## 2022-09-12 RX ADMIN — APIXABAN 5 MG: 5 TABLET, FILM COATED ORAL at 09:21

## 2022-09-12 RX ADMIN — ATORVASTATIN CALCIUM 80 MG: 40 TABLET, FILM COATED ORAL at 20:12

## 2022-09-12 RX ADMIN — Medication 10 ML: at 09:22

## 2022-09-12 RX ADMIN — HYDROCODONE BITARTRATE AND ACETAMINOPHEN 1 TABLET: 5; 325 TABLET ORAL at 20:12

## 2022-09-12 RX ADMIN — Medication 10 ML: at 20:35

## 2022-09-12 RX ADMIN — METOPROLOL SUCCINATE 25 MG: 25 TABLET, EXTENDED RELEASE ORAL at 09:21

## 2022-09-12 RX ADMIN — FOLIC ACID 1 MG: 1 TABLET ORAL at 09:21

## 2022-09-12 NOTE — DISCHARGE SUMMARY
[facility]         DISCHARGE SUMMARY    Patient Name: Hai Gold  : 1955  MRN: [id number]    Date of Admission: 9/10/2022  Date of Discharge:   Primary Care Physician: Shelly Mejia MD    Consults     Date and Time Order Name Status Description    9/10/2022  4:44 PM Inpatient Neurology Consult Stroke Completed           Presenting Problem:   Aphasia [R47.01]  Dysarthria [R47.1]    Active and Resolved Hospital Problems:  Active Hospital Problems    Diagnosis POA   • Expressive aphasia [R47.01] Yes   • Dysphagia [R13.10] Yes   • Dysarthria [R47.1] Yes      Resolved Hospital Problems   No resolved problems to display.         Hospital Course     Hospital Course:  Hai Gold is a 67 y.o. male admitted to hospital for suspected stroke for difficulty talking and dysarthria and dysphagia.  Patient has previous stroke.  Patient was admitted to medical service please see H&P for details  He was seen by neurologist and cleared of stroke  Patient has chronic issues with dysphagia, dysarthria and poor p.o. intake.  He was considered to have PEG tube in the past which was not done due to continued p.o. intake improvement..  Patient was feeling very weak and his wife was not able to take care of him he requested inpatient rehab as he was getting weaker  As he was stable and requiring assistance and PT and OT he will be discharged to inpatient rehab at skilled nursing facility        DISCHARGE Follow Up Recommendations for labs and diagnostics:   Discharge to SNF      Day of Discharge     Vital Signs:  Temp:  [97.5 °F (36.4 °C)-98.6 °F (37 °C)] 97.6 °F (36.4 °C)  Heart Rate:  [50-68] 64  Resp:  [18-22] 18  BP: (107-124)/(56-69) 107/61    Physical Exam:    *Early male not in acute distress  Cachectic and malnourished  Heart regular, lungs clear ,abdomen soft  Extremities no edema          Pertinent  and/or Most Recent Results     LAB RESULTS:      Lab 09/10/22  0830  09/07/22  1631   WBC 10.36 9.21   HEMOGLOBIN 12.3* 12.5*   HEMATOCRIT 38.2 37.6   PLATELETS 433 446   NEUTROS ABS 7.41* 5.62   IMMATURE GRANS (ABS) 0.03 0.03   LYMPHS ABS 2.05 2.66   MONOS ABS 0.79 0.76   EOS ABS 0.05 0.08   MCV 92.9 90.8   D DIMER QUANT  --  0.60*         Lab 09/11/22  0845 09/10/22  0830 09/07/22  1631   SODIUM  --  135* 138   POTASSIUM  --  4.8 4.8   CHLORIDE  --  96* 98   CO2  --  25.8 29.3*   ANION GAP  --  13.2 10.7   BUN  --  22 22   CREATININE  --  1.11 1.20   EGFR  --  72.8 66.3   GLUCOSE  --  96 135*   CALCIUM  --  9.5 9.6   MAGNESIUM  --  2.1 2.2   HEMOGLOBIN A1C 5.40  --   --          Lab 09/10/22  0830 09/07/22  1631   TOTAL PROTEIN 9.6* 9.1*   ALBUMIN 3.80 3.80   GLOBULIN 5.8 5.3   ALT (SGPT) 7 8   AST (SGOT) 13 13   BILIRUBIN 0.3 0.4   ALK PHOS 92 88   LIPASE 15 16         Lab 09/10/22  0830 09/07/22  1631   PROBNP 2,006.0* 2,483.0*         Lab 09/11/22  0450   CHOLESTEROL 128   LDL CHOL 80   HDL CHOL 33*   TRIGLYCERIDES 74             Brief Urine Lab Results  (Last result in the past 365 days)      Color   Clarity   Blood   Leuk Est   Nitrite   Protein   CREAT   Urine HCG        03/27/22 0639 Yellow   Clear   Negative   Small (1+)   Negative   Negative               Microbiology Results (last 10 days)     ** No results found for the last 240 hours. **          PROCEDURES:    [unfilled]    CT Head Without Contrast    Result Date: 9/10/2022  Impression:   1. No acute intracranial process identified. 2. Stable encephalomalacia within right cerebellum.     LAUREANO GERMAIN MD       Electronically Signed and Approved By: LAUREANO GERMAIN MD on 9/10/2022 at 11:31             MRI Brain Without Contrast    Result Date: 9/11/2022  Impression:   1. The study is slightly limited by motion artifact. 2. No acute ischemia. 3. Chronic ischemic changes. 4. Mild generalized volume loss secondary to cerebral atrophy.     ANDI ARAMBULA MD       Electronically Signed and Approved By: ANDI ARAMBULA MD on  9/11/2022 at 13:59             XR Chest 1 View    Result Date: 9/10/2022  Impression:   1. Lungs are clear. 2. Cardiomegaly.       LAUREANO GERMAIN MD       Electronically Signed and Approved By: LAUREANO GERMAIN MD on 9/10/2022 at 9:09             XR Chest 1 View    Result Date: 9/7/2022  Impression:   1. The heart is prominent in size. 2. Mild bilateral opacities again seen appear improved compared with 8/11/2022.  This may reflect improved pneumonia and/or edema.  There are superimposed atelectasis and/or fibrosis present.       CLAUDY ERNANDEZ MD       Electronically Signed and Approved By: CLAUDY ERNANDEZ MD on 9/07/2022 at 16:55                       Results for orders placed during the hospital encounter of 09/10/22    Adult Transthoracic Echo Complete W/ Cont if Necessary Per Protocol (With Agitated Saline)    Interpretation Summary  · Calculated left ventricular EF = 43.2% Estimated left ventricular EF was in agreement with the calculated left ventricular EF.  · Left ventricular diastolic function is consistent with (grade I) impaired relaxation.  · Estimated right ventricular systolic pressure from tricuspid regurgitation is mildly elevated (35-45 mmHg).      Labs Pending at Discharge:  Pending Labs     Order Current Status    Paraneoplastic Profile 1 In process    VGCC Antibody In process            Discharge Details        Discharge Medications      ASK your doctor about these medications      Instructions Start Date   apixaban 2.5 MG tablet tablet  Commonly known as: ELIQUIS   2.5 mg, Oral, 2 Times Daily      aspirin 81 MG EC tablet   81 mg, Oral, Daily      digoxin 125 MCG tablet  Commonly known as: LANOXIN   25 mg, Oral, Daily Digoxin      Enbrel Mini 50 MG/ML solution cartridge  Generic drug: Etanercept   50 mg, Subcutaneous, Every 7 Days, Mon      folic acid 1 MG tablet  Commonly known as: FOLVITE   1 mg, Oral, Daily      furosemide 40 MG tablet  Commonly known as: LASIX   40 mg, Oral, 2 Times  Daily      linaclotide 145 MCG capsule capsule  Commonly known as: LINZESS   145 mcg, Oral, Every Morning Before Breakfast      methotrexate 2.5 MG tablet   12.5 mg, Oral, Weekly, Friday (5 tabs)      metoprolol succinate XL 25 MG 24 hr tablet  Commonly known as: TOPROL-XL   50 mg, Oral, Daily      oxyCODONE-acetaminophen 7.5-325 MG per tablet  Commonly known as: PERCOCET   1 tablet, Oral, 2 Times Daily PRN      pantoprazole 20 MG EC tablet  Commonly known as: PROTONIX   20 mg, Oral, Daily      potassium chloride 10 MEQ CR tablet   10 mEq, Oral, Daily      Testosterone Enanthate 200 MG/ML solution   200 mg, Intramuscular, Every 14 Days, Wed             Allergies   Allergen Reactions   • Penicillins Hives         Discharge Disposition:    Skilled Nursing Facility ([facility])    Diet:  Heart healthy diet/soft diet      Discharge Activity:   As tolerated with assistance and PT OT        Patient will be followed by me at the skilled nursing facility      Time spent on Discharge including face to face service: 33 minutes.            I have dictated this note utilizing Dragon Dictation.             Please note that portions of this note were completed with a voice recognition program.             Part of this note may be an electronic transcription/translation of spoken language to printed text         using the Dragon Dictation System.       Electronically signed by Lazaro May MD, 09/12/22, 4:14 PM EDT.

## 2022-09-12 NOTE — THERAPY TREATMENT NOTE
Acute Care - Physical Therapy Treatment Note  [facility]     Patient Name: Hai Gold  : 1955  MRN: [id number]  Today's Date: 2022   Onset of Illness/Injury or Date of Surgery: 09/10/22  Visit Dx:     ICD-10-CM ICD-9-CM   1. Dysarthria  R47.1 784.51   2. Aphasia  R47.01 784.3   3. Difficulty in walking  R26.2 719.7   4. Decreased activities of daily living (ADL)  Z78.9 V49.89     Patient Active Problem List   Diagnosis   • Pneumonia   • Iron deficiency anemia   • Anemia   • CHF (congestive heart failure) (HCC)   • Acute systolic heart failure (CMS/HCC)   • Cytokine release syndrome, grade 1   • Cytokine release syndrome, grade 2   • Expressive aphasia   • Dysphagia   • Dysarthria     Past Medical History:   Diagnosis Date   • Arthritis    • CHF (congestive heart failure) (HCC)    • Hypertension      Past Surgical History:   Procedure Laterality Date   • COLONOSCOPY     • COLONOSCOPY N/A 2022    Procedure: COLONOSCOPY WITH POLYPECTOMIES;  Surgeon: Min Campbell MD;  Location: [facility] ENDOSCOPY;  Service: Gastroenterology;  Laterality: N/A;  COLON POLYPS, DIVERTICULOSIS   • ENDOSCOPY N/A 2022    Procedure: ESOPHAGOGASTRODUODENOSCOPY WITH BIOPSIES;  Surgeon: Min Campbell MD;  Location: [facility] ENDOSCOPY;  Service: Gastroenterology;  Laterality: N/A;  HIATAL HERNIA   • HEMORRHOIDECTOMY       PT Assessment (last 12 hours)     PT Evaluation and Treatment     Row Name 22 115          Physical Therapy Time and Intention    Subjective Information complains of;weakness  -RH     Document Type therapy note (daily note)  -RH     Mode of Treatment physical therapy;individual therapy  -RH     Patient Effort good  -RH     Row Name 22 1150          Pain Scale: FACES Pre/Post-Treatment    Pain: FACES Scale, Pretreatment 2-->hurts little bit  -RH     Posttreatment Pain Rating 2-->hurts little bit  -RH     Pain Location --  B knees  -RH     Row Name 22 1150          Bed  Mobility    Bed Mobility scooting/bridging;bed mobility (all) activities;supine-sit  -RH     All Activities, [place] (Bed Mobility) contact guard  -RH     Scooting/Bridging [place] (Bed Mobility) contact guard  -RH     Supine-Sit [place] (Bed Mobility) contact guard  -RH     Assistive Device (Bed Mobility) bed rails  -RH     Comment, (Bed Mobility) Pt maintains sitting with SBA.  -     Row Name 09/12/22 1150          Transfers    Transfers sit-stand transfer;stand-sit transfer  -RH     Sit-Stand [place] (Transfers) contact guard  -RH     Stand-Sit [place] (Transfers) contact guard  -     Row Name 09/12/22 1150          Sit-Stand Transfer    Assistive Device (Sit-Stand Transfers) --  Pt transfers without AD.  -     Comment, (Sit-Stand Transfer) Pt waits after standing prior to amb to assure stability.  -     Row Name 09/12/22 1150          Gait/Stairs (Locomotion)    Gait/Stairs Locomotion gait/ambulation independence;gait/ambulation assistive device;distance ambulated;gait pattern;gait deviations  -     [place] Level (Gait) contact guard  -RH     Assistive Device (Gait) --  Pt amb without AD.  -     Distance in Feet (Gait) 100  -     Pattern (Gait) 2-point;step-through  -     Deviations/Abnormal Patterns (Gait) base of support, narrow;gait speed decreased;stride length decreased  -     Bilateral Gait Deviations decreased arm swing  -     Row Name 09/12/22 1150          Balance    Dynamic Standing Balance contact guard  -[facility] Name 09/12/22 1150          Vital Signs    O2 Delivery Intra Treatment room air  -     Row Name 09/12/22 1150          Progress Summary (PT)    Progress Toward Functional Goals (PT) progress toward functional goals is good  -RH           User Key  (r) = Recorded By, (t) = Taken By, (c) = Cosigned By    Initials Name Provider Type     Freddie Delgado PTA Physical Therapist Assistant                Physical Therapy Education                  Title: PT OT SLP Therapies (Done)     Topic: Physical Therapy (Done)     Point: Mobility training (Done)     Learning Progress Summary           Patient Acceptance, E, VU by  at 9/11/2022 1046    Acceptance, E,TB, VU by  at 9/11/2022 0959   Family Acceptance, E, VU by  at 9/11/2022 1046                   Point: Home exercise program (Done)     Learning Progress Summary           Patient Acceptance, E, VU by  at 9/11/2022 1046    Acceptance, E,TB, VU by  at 9/11/2022 0959   Family Acceptance, E, VU by  at 9/11/2022 1046                   Point: Body mechanics (Done)     Learning Progress Summary           Patient Acceptance, E, VU by  at 9/11/2022 1046    Acceptance, E,TB, VU by  at 9/11/2022 0959   Family Acceptance, E, VU by  at 9/11/2022 1046                   Point: Precautions (Done)     Learning Progress Summary           Patient Acceptance, E, VU by  at 9/11/2022 1046    Acceptance, E,TB, VU by  at 9/11/2022 0959   Family Acceptance, E, VU by  at 9/11/2022 1046                               User Key     Initials Effective Dates Name Provider Type Discipline     06/16/21 -  Lakesha Bright OT Occupational Therapist OT     04/25/21 -  Paul Bryant, MARIAH Physical Therapist PT              PT Recommendation and Plan     Progress Summary (PT)  Progress Toward Functional Goals (PT): progress toward functional goals is good   Outcome Measures     Row Name 09/12/22 1100 09/11/22 0959          How much help from another person do you currently need...    Turning from your back to your side while in flat bed without using bedrails? 4  -RH 3  -DW     Moving from lying on back to sitting on the side of a flat bed without bedrails? 4  -RH 3  -DW     Moving to and from a bed to a chair (including a wheelchair)? 3  -RH 3  -DW     Standing up from a chair using your arms (e.g., wheelchair, bedside chair)? 3  -RH 3  -DW     Climbing 3-5 steps with a railing? 3  -RH 2  -DW     To walk in hospital  room? 3  -RH 3  -DW     AM-PAC 6 Clicks Score (PT) 20  -RH 17  -DW            Functional Assessment    Outcome Measure Options -- AM-PAC 6 Clicks Basic Mobility (PT)  -DW           User Key  (r) = Recorded By, (t) = Taken By, (c) = Cosigned By    Initials Name Provider Type     Freddie Delgado PTA Physical Therapist Assistant    Paul Guo, PT Physical Therapist                 Time Calculation:    PT Charges     Row Name 09/12/22 1148             Time Calculation    PT Received On 09/12/22  -RH              Timed Charges    94745 - PT Therapeutic Exercise Minutes 20  -RH      50261 - Gait Training Minutes  8  -RH      58923 - PT Therapeutic Activity Minutes 10  -RH              Total Minutes    Timed Charges Total Minutes 38  -RH       Total Minutes 38  -RH            User Key  (r) = Recorded By, (t) = Taken By, (c) = Cosigned By    Initials Name Provider Type     Freddie Delgado PTA Physical Therapist Assistant              Therapy Charges for Today     Code Description Service Date Service Provider Modifiers Qty    43961256372 HC PT THER PROC EA 15 MIN 9/12/2022 Freddie Delgado PTA GP 1    11532842493 HC GAIT TRAINING EA 15 MIN 9/12/2022 Freddie Delgado PTA GP 1    36729924290 HC PT THERAPEUTIC ACT EA 15 MIN 9/12/2022 Freddie Delgado PTA GP 1          PT G-Codes  Outcome Measure Options: AM-PAC 6 Clicks Daily Activity (OT), Optimal Instrument  AM-PAC 6 Clicks Score (PT): 20  AM-PAC 6 Clicks Score (OT): 18    Freddie Delgado PTA  9/12/2022

## 2022-09-12 NOTE — THERAPY EVALUATION
Acute Care - Speech Language Pathology   Swallow Initial Evaluation [facility]     Patient Name: Hai Gold  : 1955  MRN: [id number]  Today's Date: 2022  Onset of Illness/Injury or Date of Surgery: 09/10/22     Referring Physician: Lazaro May      Admit Date: 9/10/2022    Visit Dx:     ICD-10-CM ICD-9-CM   1. Dysarthria  R47.1 784.51   2. Aphasia  R47.01 784.3   3. Difficulty in walking  R26.2 719.7   4. Decreased activities of daily living (ADL)  Z78.9 V49.89   5. Dysphagia, oropharyngeal  R13.12 787.22     Patient Active Problem List   Diagnosis   • Pneumonia   • Iron deficiency anemia   • Anemia   • CHF (congestive heart failure) (HCC)   • Acute systolic heart failure (CMS/HCC)   • Cytokine release syndrome, grade 1   • Cytokine release syndrome, grade 2   • Expressive aphasia   • Dysphagia   • Dysarthria     Past Medical History:   Diagnosis Date   • Arthritis    • CHF (congestive heart failure) (HCC)    • Hypertension      Past Surgical History:   Procedure Laterality Date   • COLONOSCOPY     • COLONOSCOPY N/A 2022    Procedure: COLONOSCOPY WITH POLYPECTOMIES;  Surgeon: Min Campbell MD;  Location: [facility] ENDOSCOPY;  Service: Gastroenterology;  Laterality: N/A;  COLON POLYPS, DIVERTICULOSIS   • ENDOSCOPY N/A 2022    Procedure: ESOPHAGOGASTRODUODENOSCOPY WITH BIOPSIES;  Surgeon: Min Campbell MD;  Location: [facility] ENDOSCOPY;  Service: Gastroenterology;  Laterality: N/A;  HIATAL HERNIA   • HEMORRHOIDECTOMY           PAIN SCALE: None indicated.    PRECAUTIONS/CONTRAINDICATIONS: Standard    SUSPECTED ABUSE/NEGLECT/EXPLOITATION: None indicated.    SOCIAL/PSYCHOLOGICAL NEEDS/BARRIERS: None indicated.    PAST SOCIAL HISTORY: 67-year-old male lives at home with his wife    PRIOR FUNCTION: Independent    PATIENT GOALS/EXPECTATIONS: Did not report    HISTORY: 67-year-old male with the above diagnosis referred for speech therapy evaluation to assess for swallowing.  Prior  modified barium swallow in March 2022 negative for aspiration however did present with some pharyngeal residue after the swallow.  Was recommended regular diet and thin liquids with some compensatory strategies to double swallow and alternate solids and liquids.      CURRENT DIET LEVEL: Regular    OBJECTIVE:    TEST ADMINISTERED: Clinical dysphagia evaluation    COGNITION/SAFETY AWARENESS: Patient follow directions and answer questions without difficulty    BEHAVIORAL OBSERVATIONS: Alert and cooperative    ORAL MOTOR EXAM: Grossly within functional limits.  Patient was noted with decreased strength and coordination for laryngeal elevation.    VOICE QUALITY: Adequate    REFLEX EXAM: Deferred    POSTURE: Sitting upright in bed    FEEDING/SWALLOWING FUNCTION: Assessed with nectar liquid, thin liquids, puréed solids, soft and regular solid.     CLINICAL OBSERVATIONS: Oral stage is characterized by good bolus preparation and control.  Mildly prolonged mastication with regular solids.  Patient reports dry mouth.  No oral residue noted after the swallow.  Pharyngeal phase appears timely however does appear to have some reduced laryngeal elevation per palpation.  Reports globus sensation after the swallow with solids, liquid wash and double swallow are effective in minimizing sensation.  No overt clinical signs or symptoms of aspiration are noted.    DYSPHAGIA CRITERIA: Signs of pharyngeal dysphagia, decreased pharyngeal strength, decreased laryngeal elevation.     FUNCTIONAL ASSESSMENT INSTRUMENT: Patient currently scored a level 5 of 7 on Functional Communication Measures for swallowing indicating a 20-39% limitation in function.    ASSESSMENT/ PLAN OF CARE:  Pt presents with limitations, noted below, that impede his ability to swallow safely and maintain nutrition. The skills of a therapist will be required to safely and effectively implement the following treatment plan to restore maximal level of  function.    PROBLEMS:  1.   Swallow delay, decreased laryngeal elevation, decreased pharyngeal strength, risk of aspiration                       LTG 1: 30 days: Patient will demonstrate adequate intake with least restrictive diet utilizing strategies with minimal assistance.                       STG 1a: 14 days: Patient will tolerate diet of regular soft solids and thin liquid utilizing appropriate positioning and strategies with minimal assistance.                       STG 1b: 14 days: Patient will participate in oral motor, pharyngeal and laryngeal exercise program with strength/range of motion 4/5.                       STG 1c: 14 days: Patient/family education.                       TREATMENT: Dysphagia therapy to address swallow function through exercises and education of strategies.     TREATMENT: Dysphagia therapy to address swallow function through exercises and education of strategies.    FREQUENCY/DURATION: Twice daily 5 times per week    REHAB POTENTIAL:  Pt has fair to good rehab potential.  The following limitations may influence improvement/ length of tx: Medical status.    RECOMMENDATIONS:   1.   DIET: Mechanical soft diet  with additional sauces and gravies, thin liquid.    2.  POSITION: Positioning fully upright for all p.o. intake and 1 hour after meals.     3.  COMPENSATORY STRATEGIES: Alternate small bites and small sips of solids and liquids at a slow rate.  Frequent dry swallow.    Pt/responsible party agrees with plan of care and has been informed of all alternatives, risks and benefits.    EDUCATION  The patient has been educated in the following areas:   Modified Diet Instruction.         Irma Gardiner, SLP  9/12/2022

## 2022-09-12 NOTE — CONSULTS
"Nutrition Services    Patient Name: Hai Gold  YOB: 1955  MRN: [id number]  Admission date: 9/10/2022      CLINICAL NUTRITION ASSESSMENT      Reason for Assessment  Identified at risk by screening criteria, MST score 2+, Unintentional weight loss   H&P:    Past Medical History:   Diagnosis Date   • Arthritis    • CHF (congestive heart failure) (HCC)    • Hypertension         Current Problems:   Active Hospital Problems    Diagnosis    • Expressive aphasia    • Dysphagia    • Dysarthria         Nutrition/Diet History         Narrative     RD consult for MST score of 3, unsure of wt loss.  In review of chart wt since April has varied from 123-138#, current wt 134#.  Pt consuming % meals per intake records.  Possible discharge to rehab later today.     Anthropometrics        Current Height, Weight Height: 173.7 cm (68.4\")  Weight: 60.9 kg (134 lb 4.2 oz)   Current BMI Body mass index is 20.18 kg/m².       Weight Hx  Wt Readings from Last 30 Encounters:   09/10/22 1657 60.9 kg (134 lb 4.2 oz)   09/10/22 0746 56.2 kg (123 lb 14.4 oz)   09/07/22 1621 55.7 kg (122 lb 12.7 oz)   08/14/22 0500 61 kg (134 lb 7.7 oz)   08/13/22 0500 61 kg (134 lb 7.7 oz)   08/11/22 0935 62.6 kg (138 lb 0.1 oz)   08/11/22 0434 61.9 kg (136 lb 7.4 oz)   06/27/22 0827 58.2 kg (128 lb 4.9 oz)   05/30/22 1120 56.2 kg (123 lb 14.4 oz)   04/11/22 1050 55.6 kg (122 lb 9.2 oz)   03/26/22 2103 57.1 kg (125 lb 14.1 oz)   03/26/22 1222 57.1 kg (125 lb 14.1 oz)   11/21/19 0000 60.8 kg (134 lb)   09/06/19 0000 54.2 kg (119 lb 8 oz)   08/15/19 0000 55.5 kg (122 lb 4 oz)   03/14/19 0000 59 kg (130 lb)            Wt Change Observation Variable between 123-138# since April     Estimated/Assessed Needs       Energy Requirements    EST Needs (kcal/day) 4608-5421       Protein Requirements    EST Daily Needs (g/day) 61-73       Fluid Requirements     Estimated Needs (mL/day) 1911     Labs/Medications         Pertinent Labs Reviewed. "   Results from last 7 days   Lab Units 09/10/22  0830 09/07/22  1631   SODIUM mmol/L 135* 138   POTASSIUM mmol/L 4.8 4.8   CHLORIDE mmol/L 96* 98   CO2 mmol/L 25.8 29.3*   BUN mg/dL 22 22   CREATININE mg/dL 1.11 1.20   CALCIUM mg/dL 9.5 9.6   BILIRUBIN mg/dL 0.3 0.4   ALK PHOS U/L 92 88   ALT (SGPT) U/L 7 8   AST (SGOT) U/L 13 13   GLUCOSE mg/dL 96 135*     Results from last 7 days   Lab Units 09/11/22  0450 09/10/22  0830 09/07/22  1631   MAGNESIUM mg/dL  --  2.1 2.2   HEMOGLOBIN g/dL  --  12.3* 12.5*   HEMATOCRIT %  --  38.2 37.6   TRIGLYCERIDES mg/dL 74  --   --        Pertinent Medications Reviewed.     Current Nutrition Orders & Evaluation of Intake       Oral Nutrition     Current PO Diet Diet Regular; Cardiac   Supplement No active supplement orders       Nutrition Diagnosis         Nutrition Dx Problem 1 No nutrition diagnosis at this time      Nutrition Intervention         Continue cardiac     Medical Nutrition Therapy/Nutrition Education          Learner     Readiness Patient  Education not indicated at this time     Method     Response N/A  N/A     Monitor/Evaluation        Monitor PO intake, Pertinent labs, Weight     Nutrition Discharge Plan         No nutrition discharge needs identified at this time     Electronically signed by:  Nikki Hui RD  09/12/22 13:13 EDT

## 2022-09-12 NOTE — PLAN OF CARE
Goal Outcome Evaluation:  Plan of Care Reviewed With: patient           Outcome Evaluation: Patient stable throughout shift. Patient is x1 assist. Medicated for pain x1 with relief noted. No significant changes noted throughout shift.

## 2022-09-13 PROBLEM — R00.1 BRADYCARDIA: Status: ACTIVE | Noted: 2022-09-13

## 2022-09-13 PROCEDURE — 97110 THERAPEUTIC EXERCISES: CPT

## 2022-09-13 PROCEDURE — 97161 PT EVAL LOW COMPLEX 20 MIN: CPT

## 2022-09-13 PROCEDURE — 92610 EVALUATE SWALLOWING FUNCTION: CPT

## 2022-09-13 PROCEDURE — 97116 GAIT TRAINING THERAPY: CPT

## 2022-09-13 PROCEDURE — 92526 ORAL FUNCTION THERAPY: CPT

## 2022-09-13 PROCEDURE — 97535 SELF CARE MNGMENT TRAINING: CPT

## 2022-09-13 PROCEDURE — 97166 OT EVAL MOD COMPLEX 45 MIN: CPT

## 2022-09-13 RX ADMIN — HYDROCODONE BITARTRATE AND ACETAMINOPHEN 1 TABLET: 5; 325 TABLET ORAL at 19:24

## 2022-09-13 RX ADMIN — DOCUSATE SODIUM 100 MG: 100 CAPSULE, LIQUID FILLED ORAL at 08:34

## 2022-09-13 RX ADMIN — APIXABAN 5 MG: 5 TABLET, FILM COATED ORAL at 20:37

## 2022-09-13 RX ADMIN — FOLIC ACID 1 MG: 1 TABLET ORAL at 08:34

## 2022-09-13 RX ADMIN — FUROSEMIDE 40 MG: 40 TABLET ORAL at 08:34

## 2022-09-13 RX ADMIN — Medication 10 ML: at 08:35

## 2022-09-13 RX ADMIN — APIXABAN 5 MG: 5 TABLET, FILM COATED ORAL at 08:34

## 2022-09-13 RX ADMIN — ATORVASTATIN CALCIUM 80 MG: 40 TABLET, FILM COATED ORAL at 20:37

## 2022-09-13 RX ADMIN — Medication 10 ML: at 20:37

## 2022-09-13 RX ADMIN — ALUMINUM HYDROXIDE, MAGNESIUM HYDROXIDE, AND DIMETHICONE 7.5 ML: 400; 400; 40 SUSPENSION ORAL at 19:12

## 2022-09-13 RX ADMIN — FUROSEMIDE 40 MG: 40 TABLET ORAL at 18:12

## 2022-09-13 RX ADMIN — HYDROCODONE BITARTRATE AND ACETAMINOPHEN 1 TABLET: 5; 325 TABLET ORAL at 07:10

## 2022-09-13 RX ADMIN — PANTOPRAZOLE SODIUM 40 MG: 40 TABLET, DELAYED RELEASE ORAL at 08:34

## 2022-09-13 NOTE — PLAN OF CARE
Goal Outcome Evaluation:  Plan of Care Reviewed With: patient        Progress: improving   Up in chair most of day.  Medicated x1 with norco this am for c/o headache with good results.  No c/o soa, no difficulty taking medications or swallowing. Ambulating with assist of 1 with a walker.

## 2022-09-13 NOTE — THERAPY TREATMENT NOTE
SNF - Speech Language Pathology   Swallow Treatment Note  [facility]     Patient Name: Hai Gold  : 1955  MRN: [id number]  Today's Date: 2022               Admit Date: 2022    Visit Dx:     ICD-10-CM ICD-9-CM   1. Decreased activities of daily living (ADL)  Z78.9 V49.89   2. Dysphagia, oropharyngeal  R13.12 787.22     Patient Active Problem List   Diagnosis   • Pneumonia   • Iron deficiency anemia   • Anemia   • CHF (congestive heart failure) (HCC)   • Acute systolic heart failure (CMS/HCC)   • Cytokine release syndrome, grade 1   • Cytokine release syndrome, grade 2   • Expressive aphasia   • Dysphagia   • Dysarthria   • Generalized weakness     Past Medical History:   Diagnosis Date   • Arthritis    • CHF (congestive heart failure) (HCC)    • Hypertension      Past Surgical History:   Procedure Laterality Date   • COLONOSCOPY     • COLONOSCOPY N/A 2022    Procedure: COLONOSCOPY WITH POLYPECTOMIES;  Surgeon: Min Campbell MD;  Location: [facility] ENDOSCOPY;  Service: Gastroenterology;  Laterality: N/A;  COLON POLYPS, DIVERTICULOSIS   • ENDOSCOPY N/A 2022    Procedure: ESOPHAGOGASTRODUODENOSCOPY WITH BIOPSIES;  Surgeon: Min Campbell MD;  Location: [facility] ENDOSCOPY;  Service: Gastroenterology;  Laterality: N/A;  HIATAL HERNIA   • HEMORRHOIDECTOMY       SPEECH PATHOLOGY DYSPHAGIA TREATMENT    Subjective/Behavioral Observations: Patient seen for dysphagia therapy    Current Diet: Mechanical soft diet puréed meats thin liquids      Current Strategies: Double swallow, alternate with liquids        Treatment received: Patient seen at bedside during noon meal.  Recommended mechanical soft and thin liquids however patient with puréed meats on noon tray.  Patient does report improved clearance and denies any globus sensation when swallowing.  Tolerated soft solids without clinical sign or symptom of aspiration or report of globus sensation.  Patient did require mod cues for  compensatory strategies.  Strategies were written at bedside with patient's whiteboard        Results of treatment: As stated        Progress toward goals: Progressing        Barriers to Achieving goals: Medical status        Plan of care:/changes in plan: Mechanical soft diet-ground meats with sauce/gravy, thin liquids  /Solids as needed  Alternate solids and liquids  Double swallow  Oral meds in applesauce crushed if needed.           EDUCATION  The patient has been educated in the following areas:   Dysphagia (Swallowing Impairment).       Treatment Time: 30 min       Irma Gardiner SLP  9/13/2022

## 2022-09-13 NOTE — THERAPY DISCHARGE NOTE
Acute Care - Speech Language Pathology   Swallow D/C Note/Discharge [facility]     Patient Name: Hai Gold  : 1955  MRN: [id number]  Today's Date: 2022  Onset of Illness/Injury or Date of Surgery: 09/10/22     Referring Physician: Lazaro May      Admit Date: 9/10/2022    Visit Dx:    ICD-10-CM ICD-9-CM   1. Dysarthria  R47.1 784.51   2. Aphasia  R47.01 784.3   3. Difficulty in walking  R26.2 719.7   4. Decreased activities of daily living (ADL)  Z78.9 V49.89   5. Dysphagia, oropharyngeal  R13.12 787.22     Patient Active Problem List   Diagnosis   • Pneumonia   • Iron deficiency anemia   • Anemia   • CHF (congestive heart failure) (HCC)   • Acute systolic heart failure (CMS/HCC)   • Cytokine release syndrome, grade 1   • Cytokine release syndrome, grade 2   • Expressive aphasia   • Dysphagia   • Dysarthria   • Generalized weakness     Past Medical History:   Diagnosis Date   • Arthritis    • CHF (congestive heart failure) (HCC)    • Hypertension      Past Surgical History:   Procedure Laterality Date   • COLONOSCOPY     • COLONOSCOPY N/A 2022    Procedure: COLONOSCOPY WITH POLYPECTOMIES;  Surgeon: Min Campbell MD;  Location: [facility] ENDOSCOPY;  Service: Gastroenterology;  Laterality: N/A;  COLON POLYPS, DIVERTICULOSIS   • ENDOSCOPY N/A 2022    Procedure: ESOPHAGOGASTRODUODENOSCOPY WITH BIOPSIES;  Surgeon: Min Campbell MD;  Location: [facility] ENDOSCOPY;  Service: Gastroenterology;  Laterality: N/A;  HIATAL HERNIA   • HEMORRHOIDECTOMY     SPEECH PATHOLOGY DISCHARGE SUMMARY      DIAGNOSIS: Dysphagia     SUBJECTIVE: Seen at bedside for evaluation only.      PAIN: None noted    OBJECTIVE DATA/ CARE AND TREATMENT RECEIVED: Clinical swallow evaluation only    PATIENT STATUS AT DISCHARGE: RECOMMENDATIONS:   1.   DIET: Mechanical soft diet  with additional sauces and gravies, thin liquid.     2.  POSITION: Positioning fully upright for all p.o. intake and 1 hour after meals.       3.  COMPENSATORY STRATEGIES: Alternate small bites and small sips of solids and liquids at a slow rate.  Frequent dry swallow.     Pt/responsible party agrees with plan of care and has been informed of all alternatives, risks and benefits.       INSTRUCTIONS GIVEN TO PATIENT AND FAMILY: See above    FUNCTIONAL ASSESSMENT INSTRUMENT: Patient currently scored a level 5 of 7 on Functional Communication Measures for swallowing indicating a 20-39% limitation in function on a projected goal of 1-19% limitation.    ASSESSMENT: [id number] oropharyngeal dysphagia     REASON FOR DISCHARGE: D/C to SNF  RECOMMENDATIONS:   DISCHARGE PLAN/ RECOMMENDATIONS: RECOMMENDATIONS:   1.   DIET: Mechanical soft diet  with additional sauces and gravies, thin liquid.     2.  POSITION: Positioning fully upright for all p.o. intake and 1 hour after meals.      3.  COMPENSATORY STRATEGIES: Alternate small bites and small sips of solids and liquids at a slow rate.  Frequent dry swallow.     Pt/responsible party agrees with plan of care and has been informed of all alternatives, risks and benefits.         DISCHARGE DATE: Sept 12, 2022    TOTAL VISITS: Evaluation only             EDUCATION  The patient has been educated in the following areas:   Dysphagia (Swallowing Impairment).             Irma Gardiner, SLP  9/13/2022

## 2022-09-13 NOTE — H&P
[facility]   HISTORY AND PHYSICAL    Patient Name: Hai Gold  : 1955  MRN: [id number]  Primary Care Physician:  Shelly Mejia MD  Date of admission: 2022    Subjective   Subjective     Chief Complaint:   Generalized weakness      HPI:    Hai Gold is a 67 y.o. male admitted to hospital for suspected stroke for difficulty talking and weakness.  Patient seen by neurologist, no acute stroke.  Patient has ongoing dysphagia and difficulty talking in generalized weakness for several months.  As patient was extremely weak and unable to take care of himself, wife requested inpatient rehab, patient transferred to skilled nursing facility.  Patient is feeling better.  Appetite improving would like to haveSolid food.  Currently on puréed for dysphagia        Review of Systems:    No chest pain or shortness of breath  Generalized weakness  Heart rate slow  No palpitations or dizziness      Personal History     Past Medical History:   Diagnosis Date   • Arthritis    • CHF (congestive heart failure) (HCC)    • Hypertension        Past Surgical History:   Procedure Laterality Date   • COLONOSCOPY     • COLONOSCOPY N/A 2022    Procedure: COLONOSCOPY WITH POLYPECTOMIES;  Surgeon: Min Campbell MD;  Location: [facility] ENDOSCOPY;  Service: Gastroenterology;  Laterality: N/A;  COLON POLYPS, DIVERTICULOSIS   • ENDOSCOPY N/A 2022    Procedure: ESOPHAGOGASTRODUODENOSCOPY WITH BIOPSIES;  Surgeon: Min Campbell MD;  Location: [facility] ENDOSCOPY;  Service: Gastroenterology;  Laterality: N/A;  HIATAL HERNIA   • HEMORRHOIDECTOMY         Family History: family history is not on file. Otherwise pertinent FHx was reviewed and not pertinent to current issue.    Social History:  reports that he has never smoked. He has never used smokeless tobacco. He reports previous alcohol use. He reports that he does not use drugs.    Home Medications:  Etanercept, Testosterone Enanthate,  apixaban, aspirin, digoxin, folic acid, furosemide, linaclotide, methotrexate, metoprolol succinate XL, oxyCODONE-acetaminophen, pantoprazole, and potassium chloride      Allergies:  Allergies   Allergen Reactions   • Penicillins Hives       Objective   Objective     Vitals:   Temp:  [97.3 °F (36.3 °C)-98 °F (36.7 °C)] 97.3 °F (36.3 °C)  Heart Rate:  [42-57] 50  Resp:  [16-20] 16  BP: (108-123)/(60-70) 115/64    Physical Exam    Elderly male not in acute distress cachectic and weak  HEENT is unremarkable  Heart regular  Lungs clear  Abdomen soft  Extremities free of edema    I have personally reviewed the results from the time of this admission to 9/13/2022 15:11 EDT and agree with these findings:  [x]  Laboratory  []  Microbiology  []  Radiology  []  EKG/Telemetry   []  Cardiology/Vascular   []  Pathology  []  Old records  []  Other:    CBC:    No results found for: WBC, RBC, HGB, HCT, MCV, MCH, MCHC, RDW, RDWSD, MPV, PLT, NEUTRORELPCT, LYMPHORELPCT, MONORELPCT, EOSRELPCT, BASORELPCT, AUTOIGPER, NEUTROABS, LYMPHSABS, MONOSABS, EOSABS, BASOSABS, AUTOIGNUM, NRBC     BMP:    Lab Results   Component Value Date    GLUCOSE 96 09/10/2022    BUN 22 09/10/2022    CREATININE 1.11 09/10/2022    EGFRIFAFRI >150 01/04/2022    BCR 19.8 09/10/2022    K 4.8 09/10/2022    CO2 25.8 09/10/2022    CALCIUM 9.5 09/10/2022    ALBUMIN 3.80 09/10/2022    LABIL2 0.6 (L) 12/31/2020    AST 13 09/10/2022    ALT 7 09/10/2022        No radiology results for the last day           Assessment & Plan   Assessment / Plan       Current Diagnosis:  Active Hospital Problems    Diagnosis    • Bradycardia    • Generalized weakness    • Dysphagia      Plan:   Patient admitted to skilled nursing facility, heart rate in the 50s and 40s, will hold metoprolol.  Otherwise resume all home meds and meds discharged from hospital.  PT and OT.  Inpatient rehab efforts.  Advance diet.      DVT prophylaxis:  Medical and mechanical DVT prophylaxis orders are  present.    GI Prophylaxis:    Pepcid/Protonix    CODE STATUS:    Code Status (Patient has no pulse and is not breathing): CPR (Attempt to Resuscitate)  Medical Interventions (Patient has pulse or is breathing): Full Support  Release to patient: Routine Release    Admission Status:  I believe this patient meets inpatient status.             I have dictated this note utilizing Dragon Dictation.             Please note that portions of this note were completed with a voice recognition program.             Part of this note may be an electronic transcription/translation of spoken language to printed text         using the Dragon Dictation System.       Electronically signed by Lazaro May MD, 09/13/22, 3:09 PM EDT.    Total time spent with in evaluation and management:

## 2022-09-13 NOTE — PLAN OF CARE
Goal Outcome Evaluation:  Plan of Care Reviewed With: patient        Progress: improving  Outcome Evaluation: Alert and oriented. Norco administered x1 with relief. Ambulating to BR x1 person assist; denies dizziness while up. B/P 108/60 HR 42 with vital signs early this shift; 52 and irregular this morning. Continue plan of care. Call light within reach.

## 2022-09-13 NOTE — PLAN OF CARE
Goal Outcome Evaluation:     FUNCTIONAL ASSESSMENT INSTRUMENT: Patient currently scored a level 5 of 7 on Functional Communication Measures for swallowing indicating a 20-39% limitation in function.     ASSESSMENT/ PLAN OF CARE:  Pt presents with limitations, noted below, that impede his ability to swallow safely and maintain nutrition. The skills of a therapist will be required to safely and effectively implement the following treatment plan to restore maximal level of function.     PROBLEMS:  1.   Swallow delay, decreased laryngeal elevation, decreased pharyngeal strength, risk of aspiration                          TREATMENT: Dysphagia therapy to address swallow function through exercises and education of strategies.                TREATMENT: Dysphagia therapy to address swallow function through exercises and education of strategies.     FREQUENCY/DURATION: daily 5 times per week     REHAB POTENTIAL:  Pt has fair to good rehab potential.  The following limitations may influence improvement/ length of tx: Medical status.     RECOMMENDATIONS:   1.   DIET: Mechanical soft diet  with additional sauces and gravies, thin liquid.     2.  POSITION: Positioning fully upright for all p.o. intake and 1 hour after meals.      3.  COMPENSATORY STRATEGIES: Alternate small bites and small sips of solids and liquids at a slow rate.  Frequent dry swallow.     YES: Pt/responsible party agrees with plan of care and has been informed of all alternatives, risks and benefits.     EDUCATION  The patient has been educated in the following areas:   Modified Diet Instruction.               Irma Gardiner, SLP               9/13/2022

## 2022-09-13 NOTE — THERAPY EVALUATION
SNF - Physical Therapy Initial Evaluation  [facility]     Patient Name: Hai Gold  : 1955  MRN: [id number]  Today's Date: 2022      Visit Dx:    ICD-10-CM ICD-9-CM   1. Decreased activities of daily living (ADL)  Z78.9 V49.89   2. Dysphagia, oropharyngeal  R13.12 787.22   3. Difficulty walking  R26.2 719.7     Patient Active Problem List   Diagnosis   • Pneumonia   • Iron deficiency anemia   • Anemia   • CHF (congestive heart failure) (HCC)   • Acute systolic heart failure (CMS/HCC)   • Cytokine release syndrome, grade 1   • Cytokine release syndrome, grade 2   • Expressive aphasia   • Dysphagia   • Dysarthria   • Generalized weakness   • Bradycardia     Past Medical History:   Diagnosis Date   • Arthritis    • CHF (congestive heart failure) (HCC)    • Hypertension      Past Surgical History:   Procedure Laterality Date   • COLONOSCOPY     • COLONOSCOPY N/A 2022    Procedure: COLONOSCOPY WITH POLYPECTOMIES;  Surgeon: Min Campbell MD;  Location: [facility] ENDOSCOPY;  Service: Gastroenterology;  Laterality: N/A;  COLON POLYPS, DIVERTICULOSIS   • ENDOSCOPY N/A 2022    Procedure: ESOPHAGOGASTRODUODENOSCOPY WITH BIOPSIES;  Surgeon: Min Campbell MD;  Location: [facility] ENDOSCOPY;  Service: Gastroenterology;  Laterality: N/A;  HIATAL HERNIA   • HEMORRHOIDECTOMY         PT Assessment (last 12 hours)     PT Evaluation and Treatment     Row Name 22 1500          Physical Therapy Time and Intention    Subjective Information complains of;weakness;fatigue  -CS     Document Type evaluation;therapy note (daily note)  -CS     Mode of Treatment individual therapy;physical therapy  -CS     Patient Effort good  -CS     Symptoms Noted During/After Treatment fatigue  -CS     Row Name 22 1500          General Information    Patient Profile Reviewed yes  -CS     Patient Observations alert;cooperative;agree to therapy  -CS     Prior Level of Function independent:;ADL's;bed  mobility;transfer;gait;all household mobility  -CS     Equipment Currently Used at Home none  -CS     Existing Precautions/Restrictions fall  Patient states he will use the wall to lean on if feeling off balance  -CS     Barriers to Rehab none identified  -     Row Name 09/13/22 1500          Living Environment    Current Living Arrangements home  -CS     Home Accessibility stairs to enter home;stairs within home  -CS     People in Home spouse  -CS     Primary Care Provided by self  -     Row Name 09/13/22 1500          Home Main Entrance    Number of Stairs, Main Entrance twelve  -CS     Stair Railings, Main Entrance railing on right side (ascending)  -     Row Name 09/13/22 1500          Stairs Within Home, Primary    Stairs, Within Home, Primary 15 stairs to the basement  -CS     Stair Railings, Within Home, Primary railing on right side (ascending)  -     Row Name 09/13/22 1500          Cognition    Orientation Status (Cognition) oriented x 3  -     Row Name 09/13/22 1500          Range of Motion Comprehensive    General Range of Motion bilateral lower extremity ROM WFL  -     Row Name 09/13/22 1500          Strength Comprehensive (MMT)    General Manual Muscle Testing (MMT) Assessment lower extremity strength deficits identified  -CS     Comment, General Manual Muscle Testing (MMT) Assessment BLEs assessed at 3+/5  -     Row Name 09/13/22 1500          Bed Mobility    Bed Mobility bed mobility (all) activities  -     All Activities, [place] (Bed Mobility) standby assist;contact guard;verbal cues  -CS     Bed Mobility, Safety Issues decreased use of arms for pushing/pulling;decreased use of legs for bridging/pushing  -     Assistive Device (Bed Mobility) bed rails  -     Row Name 09/13/22 1500          Transfers    Transfers sit-stand transfer;stand-sit transfer  -     Sit-Stand [place] (Transfers) verbal cues;contact guard;minimum assist (75% patient effort)  -     Stand-Sit  [place] (Transfers) verbal cues;contact guard  -     Row Name 09/13/22 1500          Sit-Stand Transfer    Assistive Device (Sit-Stand Transfers) walker, front-wheeled  -     Comment, (Sit-Stand Transfer) x6 with cues for appropriate hand placement and techniques for safety and reducing fall risk.  -     Row Name 09/13/22 1500          Stand-Sit Transfer    Assistive Device (Stand-Sit Transfers) walker, front-wheeled  -     Row Name 09/13/22 1500          Gait/Stairs (Locomotion)    Gait/Stairs Locomotion gait/ambulation assistive device  -     [place] Level (Gait) verbal cues;contact guard;minimum assist (75% patient effort)  -     Assistive Device (Gait) walker, front-wheeled  -CS     Distance in Feet (Gait) 80x2, 100  -CS     Pattern (Gait) 4-point;step-through  -     Deviations/Abnormal Patterns (Gait) base of support, narrow;gait speed decreased;stride length decreased  -[facility] Name 09/13/22 1500          Safety Issues, Functional Mobility    Impairments Affecting Function (Mobility) balance;endurance/activity tolerance;strength;coordination  -CS     Row Name 09/13/22 1500          Balance    Balance Assessment standing dynamic balance  -     Dynamic Standing Balance verbal cues;contact guard;minimal assist  -CS     Position/Device Used, Standing Balance supported;walker, front-wheeled  -     Row Name 09/13/22 1500          Motor Skills    Therapeutic Exercise aerobic  -CS     Row Name 09/13/22 1500          Aerobic Exercise    Type (Aerobic Exercise) recumbent stationary bike  nu-step, 15 minutes  -     Time Performed (Aerobic Exercise) resistance level 1  -     Row Name 09/13/22 1500          Plan of Care Review    Plan of Care Reviewed With patient  -CS     Progress no change  -     Outcome Evaluation Pt presents with limitations that impede their ability to safely and independently transfer and ambulate. The skills of a therapist will be required to safely and  effectively implement the following treatment plan to restore maximal level of function.  -     Row Name 09/13/22 1500          Therapy Assessment/Plan (PT)    Rehab Potential (PT) good, to achieve stated therapy goals  -CS     Criteria for Skilled Interventions Met (PT) yes;skilled treatment is necessary  -CS     Therapy Frequency (PT) 6 times/wk  -CS     Predicted Duration of Therapy Intervention (PT) 30 days  -CS     Problem List (PT) problems related to;balance;mobility;strength;other (see comments)  Endurance  -CS     Activity Limitations Related to Problem List (PT) unable to ambulate safely;unable to transfer safely  -CS     Row Name 09/13/22 1500          PT Evaluation Complexity    History, PT Evaluation Complexity no personal factors and/or comorbidities  -CS     Examination of Body Systems (PT Eval Complexity) total of 4 or more elements  -CS     Clinical Presentation (PT Evaluation Complexity) stable  -CS     Clinical Decision Making (PT Evaluation Complexity) low complexity  -CS     Overall Complexity (PT Evaluation Complexity) low complexity  -     Row Name 09/13/22 1500          Therapy Plan Review/Discharge Plan (PT)    Therapy Plan Review (PT) evaluation/treatment results reviewed;patient  -[facility] Name 09/13/22 1500          Physical Therapy Goals    Bed Mobility Goal Selection (PT) bed mobility, PT goal 1  -CS     Transfer Goal Selection (PT) transfer, PT goal 1  -CS     Gait Training Goal Selection (PT) gait training, PT goal 1  -CS     Stairs Goal Selection (PT) stairs, PT goal 1  -[facility] Name 09/13/22 1500          Bed Mobility Goal 1 (PT)    Activity/Assistive Device (Bed Mobility Goal 1, PT) bed mobility activities, all  -CS     [place] Level/Cues Needed (Bed Mobility Goal 1, PT) independent  -CS     Time Frame (Bed Mobility Goal 1, PT) long term goal (LTG);30 days  -     Row Name 09/13/22 1500          Transfer Goal 1 (PT)    Activity/Assistive Device (Transfer Goal 1, PT)  sit-to-stand/stand-to-sit;bed-to-chair/chair-to-bed;walker, rolling  -CS     [place] Level/Cues Needed (Transfer Goal 1, PT) modified independence  -CS     Time Frame (Transfer Goal 1, PT) long term goal (LTG);30 days  -CS     Row Name 09/13/22 1500          Gait Training Goal 1 (PT)    Activity/Assistive Device (Gait Training Goal 1, PT) gait (walking locomotion);assistive device use;walker, rolling  -CS     [place] Level (Gait Training Goal 1, PT) modified independence  -CS     Distance (Gait Training Goal 1, PT) 300  -CS     Time Frame (Gait Training Goal 1, PT) long term goal (LTG);30 days  -CS     Row Name 09/13/22 1500          Stairs Goal 1 (PT)    Activity/Assistive Device (Stairs Goal 1, PT) ascending stairs;descending stairs;using handrail, left;using handrail, right  -CS     [place] Level/Cues Needed (Stairs Goal 1, PT) modified independence  -CS     Number of Stairs (Stairs Goal 1, PT) 15  -CS     Time Frame (Stairs Goal 1, PT) long term goal (LTG);30 days  -CS           User Key  (r) = Recorded By, (t) = Taken By, (c) = Cosigned By    Initials Name Provider Type    CS Gissell Chaudhary PT Physical Therapist              Section G        Balance  Balance during transitions & walking: Not steady, requires assist to steady  Moving from seated to standing position: Not steady, requires assist to steady  Walking: Not steady, requires assist to steady  Turning around while walking: Not steady, requires assist to steady  Surface-to-surface transfer: Not steady, requires assist to steady  Mobility devices: Walker  Range of Motion  Lower Extremity: No impairment  Section GG                       Physical Therapy Education                 Title: PT OT SLP Therapies (In Progress)     Topic: Physical Therapy (In Progress)     Point: Mobility training (In Progress)     Learning Progress Summary           Patient Acceptance, E, NR by JENNIFER at 9/13/2022 7649                   Point: Home exercise program  (Not Started)     Learner Progress:  Not documented in this visit.          Point: Body mechanics (Not Started)     Learner Progress:  Not documented in this visit.          Point: Precautions (In Progress)     Learning Progress Summary           Patient Acceptance, E, NR by JENNIFER at 9/13/2022 1535                               User Key     Initials Effective Dates Name Provider Type Discipline     04/25/21 -  Gissell Chaudhary, PT Physical Therapist PT              PT Recommendation and Plan  Anticipated Discharge Disposition (PT): home with outpatient therapy services, home with home health, home with assist  Planned Therapy Interventions (PT): balance training, bed mobility training, gait training, transfer training, strengthening, stair training  Therapy Frequency (PT): 6 times/wk  Plan of Care Reviewed With: patient  Progress: no change  Outcome Evaluation: Pt presents with limitations that impede their ability to safely and independently transfer and ambulate. The skills of a therapist will be required to safely and effectively implement the following treatment plan to restore maximal level of function.   Outcome Measures     Row Name 09/13/22 1500             How much help from another person do you currently need...    Turning from your back to your side while in flat bed without using bedrails? 3  -CS      Moving from lying on back to sitting on the side of a flat bed without bedrails? 3  -CS      Moving to and from a bed to a chair (including a wheelchair)? 3  -CS      Standing up from a chair using your arms (e.g., wheelchair, bedside chair)? 3  -CS      Climbing 3-5 steps with a railing? 2  -CS      To walk in hospital room? 3  -CS      AM-PAC 6 Clicks Score (PT) 17  -CS              Functional Assessment    Outcome Measure Options AM-PAC 6 Clicks Basic Mobility (PT)  -            User Key  (r) = Recorded By, (t) = Taken By, (c) = Cosigned By    Initials Name Provider Type    Gissell Pennington, PT  Physical Therapist                 Time Calculation:    PT Charges     Row Name 09/13/22 1508             Time Calculation    PT Received On 09/13/22  -CS      PT Goal Re-Cert Due Date 10/12/22  -CS              Timed Charges    51228 - PT Therapeutic Exercise Minutes 15  -CS      86414 - Gait Training Minutes  8  -CS      08957 - PT Therapeutic Activity Minutes 3  -CS              Untimed Charges    PT Eval/Re-eval Minutes 46  -CS              SNF Physical Therapy Minutes    Skilled Minutes- PT 26 min  -CS              Total Minutes    Timed Charges Total Minutes 26  -CS      Untimed Charges Total Minutes 46  -CS       Total Minutes 72  -CS            User Key  (r) = Recorded By, (t) = Taken By, (c) = Cosigned By    Initials Name Provider Type    CS Gissell Chaudhary, PT Physical Therapist              Therapy Charges for Today     Code Description Service Date Service Provider Modifiers Qty    16028120436 HC PT THER PROC EA 15 MIN 9/13/2022 Gissell Chaudhary, PT GP 1    57208475191 HC GAIT TRAINING EA 15 MIN 9/13/2022 Gissell Chaudhary, PT GP 1    09545577307 HC PT EVAL LOW COMPLEXITY 4 9/13/2022 Gissell Chaudhary, PT GP 1          PT G-Codes  Outcome Measure Options: AM-PAC 6 Clicks Basic Mobility (PT)  AM-PAC 6 Clicks Score (PT): 17  AM-PAC 6 Clicks Score (OT): 21    Gissell Chaudhary PT  9/13/2022

## 2022-09-13 NOTE — THERAPY EVALUATION
SNF - Occupational Therapy Initial Evaluation   [place]    Patient Name: Hai Gold  : 1955    MRN: [id number]                              Today's Date: 2022       Admit Date: 2022    Visit Dx:     ICD-10-CM ICD-9-CM   1. Decreased activities of daily living (ADL)  Z78.9 V49.89     Patient Active Problem List   Diagnosis   • Pneumonia   • Iron deficiency anemia   • Anemia   • CHF (congestive heart failure) (HCC)   • Acute systolic heart failure (CMS/HCC)   • Cytokine release syndrome, grade 1   • Cytokine release syndrome, grade 2   • Expressive aphasia   • Dysphagia   • Dysarthria   • Generalized weakness     Past Medical History:   Diagnosis Date   • Arthritis    • CHF (congestive heart failure) (HCC)    • Hypertension      Past Surgical History:   Procedure Laterality Date   • COLONOSCOPY     • COLONOSCOPY N/A 2022    Procedure: COLONOSCOPY WITH POLYPECTOMIES;  Surgeon: Min Campbell MD;  Location: [facility] ENDOSCOPY;  Service: Gastroenterology;  Laterality: N/A;  COLON POLYPS, DIVERTICULOSIS   • ENDOSCOPY N/A 2022    Procedure: ESOPHAGOGASTRODUODENOSCOPY WITH BIOPSIES;  Surgeon: Min Campbell MD;  Location: [facility] ENDOSCOPY;  Service: Gastroenterology;  Laterality: N/A;  HIATAL HERNIA   • HEMORRHOIDECTOMY        General Information     Row Name 22 1052 22 1038       OT Time and Intention    Document Type therapy note (daily note)  -AV evaluation  -AV    Mode of Treatment individual therapy;occupational therapy  -AV individual therapy;occupational therapy  -AV    Row Name 22 1038          General Information    Patient Profile Reviewed yes  -AV     Prior Level of Function independent:;ADL's;all household mobility  Sits to bathe (tub without DME).  Stands at sink to groom.  Regular commode.  Ambulates without assistive device.  No home oxygen.  Does not perform home management tasks.  -AV     Existing Precautions/Restrictions fall;swallow  precautions  Puréed, soft diet  -AV     Barriers to Rehab none identified  -AV     Row Name 09/13/22 1038          Occupational Profile    Reason for Services/Referral (Occupational Profile) Patient is a 67 year old male admitted to Skilled Nursing Facility on September 12, 2022.   OT consulted due to recent decline in ADL/transfer independence.  Patient was seen briefly in OT during hospitalization..  -AV     Row Name 09/13/22 1038          Living Environment    People in Home spouse  -AV     Row Name 09/13/22 1038          Home Main Entrance    Number of Stairs, Main Entrance twelve  -AV     Row Name 09/13/22 1052 09/13/22 1038       Cognition    Orientation Status (Cognition) --  Receptive to cues for safe positioning and adaptive techniques.  Motivated to regain independence and strength to return home.  -AV --  Patient is alert, pleasant and cooperative- able to retain information and follow commands.  -AV    Row Name 09/13/22 1052 09/13/22 1038       Safety Issues, Functional Mobility    Impairments Affecting Function (Mobility) balance;endurance/activity tolerance;strength;coordination  -AV balance;endurance/activity tolerance;strength;coordination  -AV          User Key  (r) = Recorded By, (t) = Taken By, (c) = Cosigned By    Initials Name Provider Type    AV Tray Marcelo, OT Occupational Therapist                 Mobility/ADL's     Row Name 09/13/22 1052 09/13/22 1041       Bed Mobility    Supine-Sit [place] (Bed Mobility) independent  -AV independent  -AV    Assistive Device (Bed Mobility) bed rails  -AV bed rails  -AV    Comment, (Bed Mobility) -- In preparation for ADLs out of bed.  Increased time requirement.  -AV    Row Name 09/13/22 1052 09/13/22 1041       Transfers    Transfers -- sit-stand transfer;bed-chair transfer  -AV    Bed-Chair [place] (Transfers) contact guard;verbal cues  -AV contact guard;verbal cues  -AV    Assistive Device (Bed-Chair Transfers) walker, front-wheeled  -AV  "walker, front-wheeled  -AV    Sit-Stand [place] (Transfers) contact guard;verbal cues  -AV contact guard;verbal cues  -AV    Row Name 09/13/22 1041          Bed-Chair Transfer    Comment, (Bed-Chair Transfer) Cues for safe hand placement  -AV     Row Name 09/13/22 1052 09/13/22 1041       Sit-Stand Transfer    Assistive Device (Sit-Stand Transfers) walker, front-wheeled  -AV walker, front-wheeled  -AV    Comment, (Sit-Stand Transfer) X4  -AV Sit to stand x4 during functional ADL session.  -AV    Row Name 09/13/22 1052 09/13/22 1041       Activities of Daily Living    BADL Assessment/Intervention --  Patient able to complete bathing CGA with set up and cues for safe positioning at bedside.(I) UB dressing, CGA/min assist LB dressing with cues for safe positioning.  -AV --  Patient is able to feed self independently.  He is at min assist level for grooming with set up while seated.  CGA bathing with set up at bedside.  CGA/min assist dressing.  Min assist toilet hygiene (elevated commode).  -AV          User Key  (r) = Recorded By, (t) = Taken By, (c) = Cosigned By    Initials Name Provider Type    AV Tray Marcelo, OT Occupational Therapist               Obj/Interventions     [facility] Name 09/13/22 1042          Sensory Assessment (Somatosensory)    Sensory Assessment (Somatosensory) UE sensation intact  -AV     [facility] Name 09/13/22 1042          Vision Assessment/Intervention    Visual Impairment/Limitations WFL;corrective lenses for reading  -AV     [facility] Name 09/13/22 1042          Range of Motion Comprehensive    General Range of Motion bilateral upper extremity ROM WFL  AROM  -AV     [facility] Name 09/13/22 1042          Strength Comprehensive (MMT)    Comment, General Manual Muscle Testing (MMT) Assessment 4 -/5 bilateral upper extremities  -AV     [facility] Name 09/13/22 1042          Motor Skills    Motor Skills coordination;functional endurance  -AV     Coordination --  Right dominant.  Diminished, \"sloppy writing\"  -AV     " Functional Endurance Fair  -AV     Row Name 09/13/22 1053 09/13/22 1042       Balance    Balance Assessment sitting dynamic balance;standing dynamic balance  -AV sitting dynamic balance;standing dynamic balance  -AV    Dynamic Sitting Balance -- independent  -AV    Dynamic Standing Balance -- contact guard;verbal cues  -AV    Position/Device Used, Standing Balance -- walker, front-wheeled  -AV    Balance Interventions sitting;standing;sit to stand;supported;minimal challenge;occupation based/functional task  -AV --          User Key  (r) = Recorded By, (t) = Taken By, (c) = Cosigned By    Initials Name Provider Type    AV Tray Marcelo OT Occupational Therapist               Goals/Plan     Row Name 09/13/22 1045          Transfer Goal 1 (OT)    Activity/Assistive Device (Transfer Goal 1, OT) transfers, all  -AV     [place] Level/Cues Needed (Transfer Goal 1, OT) modified independence  -AV     Time Frame (Transfer Goal 1, OT) long term goal (LTG);30 days  -AV     [facility] Name 09/13/22 1045          Bathing Goal 1 (OT)    Activity/Device (Bathing Goal 1, OT) bathing skills, all;tub bench  -AV     [place] Level/Cues Needed (Bathing Goal 1, OT) modified independence  -AV     Time Frame (Bathing Goal 1, OT) long term goal (LTG);30 days  -AV     [facility] Name 09/13/22 1045          Dressing Goal 1 (OT)    Activity/Device (Dressing Goal 1, OT) dressing skills, all  -AV     [place]/Cues Needed (Dressing Goal 1, OT) modified independence  -AV     Time Frame (Dressing Goal 1, OT) long term goal (LTG);30 days  -AV     [facility] Name 09/13/22 1045          Toileting Goal 1 (OT)    Activity/Device (Toileting Goal 1, OT) toileting skills, all;raised toilet seat  -AV     [place] Level/Cues Needed (Toileting Goal 1, OT) modified independence  -AV     Time Frame (Toileting Goal 1, OT) long term goal (LTG);30 days  -AV     [facility] Name 09/13/22 1045          Grooming Goal 1 (OT)    Activity/Device (Grooming Goal 1, OT) grooming  skills, all  -AV     [place] (Grooming Goal 1, OT) modified independence  Standing at sink  -AV     Time Frame (Grooming Goal 1, OT) long term goal (LTG);30 days  -AV     Row Name 09/13/22 1045          Strength Goal 1 (OT)    Strength Goal 1 (OT) Patient will demonstrate 5/5 bilateral upper extremities to increase ADL and transfer independence.  -AV     Time Frame (Strength Goal 1, OT) long term goal (LTG);30 days  -AV     Row Name 09/13/22 1045          Problem Specific Goal 1 (OT)    Problem Specific Goal 1 (OT) Patient will demonstrate good endurance/activity tolerance needed to support ADLs.  -AV     Time Frame (Problem Specific Goal 1, OT) long term goal (LTG);30 days  -AV     Row Name 09/13/22 1045          Therapy Assessment/Plan (OT)    Planned Therapy Interventions (OT) activity tolerance training;BADL retraining;functional balance retraining;occupation/activity based interventions;patient/caregiver education/training;transfer/mobility retraining;strengthening exercise  -AV           User Key  (r) = Recorded By, (t) = Taken By, (c) = Cosigned By    Initials Name Provider Type    AV Tray Marcelo OT Occupational Therapist               Clinical Impression     Row Name 09/13/22 1043          Pain Scale: FACES Pre/Post-Treatment    Pain: FACES Scale, Pretreatment 0-->no hurt  -AV     Posttreatment Pain Rating 0-->no hurt  -AV     Row Name 09/13/22 1043          Plan of Care Review    Plan of Care Reviewed With patient  -AV     Progress no change  First session  -AV     Outcome Evaluation Patient presents with limitations of balance, strength, coordination and endurance/activity tolerance which impede his ability to perform ADL and transfers as prior.  The skills of a therapist will be required to safely and effectively implement treatment plan to restore maximal level of function.  -AV     Row Name 09/13/22 1043          Therapy Assessment/Plan (OT)    Patient/Family Therapy Goal Statement (OT) Regain  independence and strength to return home  -AV     Rehab Potential (OT) good, to achieve stated therapy goals  -AV     Criteria for Skilled Therapeutic Interventions Met (OT) yes;meets criteria;skilled treatment is necessary  -AV     Therapy Frequency (OT) 5 times/wk  -AV     Row Name 09/13/22 1043          Therapy Plan Review/Discharge Plan (OT)    Equipment Needs Upon Discharge (OT) walker, rolling;commode chair;tub bench  -AV     Anticipated Discharge Disposition (OT) home with home health  -AV     Row Name 09/13/22 1043          Vital Signs    O2 Delivery Pre Treatment room air  -AV     O2 Delivery Intra Treatment room air  -AV     O2 Delivery Post Treatment room air  -AV           User Key  (r) = Recorded By, (t) = Taken By, (c) = Cosigned By    Initials Name Provider Type    Tray Parker, DUGLAS Occupational Therapist               Outcome Measures     Row Name 09/13/22 1046          How much help from another is currently needed...    Putting on and taking off regular lower body clothing? 3  -AV     Bathing (including washing, rinsing, and drying) 3  -AV     Toileting (which includes using toilet bed pan or urinal) 3  -AV     Putting on and taking off regular upper body clothing 4  -AV     Taking care of personal grooming (such as brushing teeth) 4  -AV     Eating meals 4  -AV     AM-PAC 6 Clicks Score (OT) 21  -AV     Row Name 09/13/22 0835          How much help from another person do you currently need...    Turning from your back to your side while in flat bed without using bedrails? 4  -LH     Moving from lying on back to sitting on the side of a flat bed without bedrails? 4  -LH     Moving to and from a bed to a chair (including a wheelchair)? 3  -LH     Standing up from a chair using your arms (e.g., wheelchair, bedside chair)? 3  -LH     Climbing 3-5 steps with a railing? 2  -LH     To walk in hospital room? 3  -LH     AM-PAC 6 Clicks Score (PT) 19  -LH     Highest level of mobility 6 --> Walked 10  steps or more  -     Row Name 09/13/22 1046          Optimal Instrument    Bending/Stooping 2  -AV     Standing 2  -AV     Reaching 1  -AV           User Key  (r) = Recorded By, (t) = Taken By, (c) = Cosigned By    Initials Name Provider Type     Gladys Gu, RN Registered Nurse    rTay Parker OT Occupational Therapist              Section G  Mobility  Dressing - self performance: limited assistance (staff provide guided maneuvering of limbs or other non-weight bearing assistance)  Dressing support/assistance: One person assist  Eating - self performance: independent  Eating support/assistance: No setup or physical help  Toileting - self performance: limited assistance (staff provide guided maneuvering of limbs or other non-weight bearing assistance)  Toileting support/assistance: One person assist  Personal hygiene - self performance: limited assistance (staff provide guided maneuvering of limbs or other non-weight bearing assistance)  Personal hygiene support/assistance: One person assist  Bathing  Bathing - self performance: Physical help with bathing (exclude washing back and hair for patient)  Bathing support/assistance: One person assist     Range of Motion  Upper Extremity: No impairment  Section GG                         Occupational Therapy Education                 Title: PT OT SLP Therapies (In Progress)     Topic: Occupational Therapy (In Progress)     Point: ADL training (Done)     Description:   Instruct learner(s) on proper safety adaptation and remediation techniques during self care or transfers.   Instruct in proper use of assistive devices.              Learning Progress Summary           Patient Acceptance, E, VU by HUMBLE at 9/13/2022 1050    Comment: Need for staff assistance for all standing ADLs/transfers  Safe positioning ADLs  Safe transfer techniques                   Point: Home exercise program (Not Started)     Description:   Instruct learner(s) on appropriate technique for  monitoring, assisting and/or progressing therapeutic exercises/activities.              Learner Progress:  Not documented in this visit.          Point: Precautions (Done)     Description:   Instruct learner(s) on prescribed precautions during self-care and functional transfers.              Learning Progress Summary           Patient Acceptance, MARVIN, VU by AV at 9/13/2022 1050    Comment: Need for staff assistance for all standing ADLs/transfers  Safe positioning ADLs  Safe transfer techniques                   Point: Body mechanics (Not Started)     Description:   Instruct learner(s) on proper positioning and spine alignment during self-care, functional mobility activities and/or exercises.              Learner Progress:  Not documented in this visit.                      User Key     Initials Effective Dates Name Provider Type Discipline    AV 06/16/21 -  Tray Marcelo, DUGLAS Occupational Therapist OT              OT Recommendation and Plan  Planned Therapy Interventions (OT): activity tolerance training, BADL retraining, functional balance retraining, occupation/activity based interventions, patient/caregiver education/training, transfer/mobility retraining, strengthening exercise  Therapy Frequency (OT): 5 times/wk  Plan of Care Review  Plan of Care Reviewed With: patient  Progress: no change (First session)  Outcome Evaluation: Patient presents with limitations of balance, strength, coordination and endurance/activity tolerance which impede his ability to perform ADL and transfers as prior.  The skills of a therapist will be required to safely and effectively implement treatment plan to restore maximal level of function.     Time Calculation:    Time Calculation- OT     Row Name 09/13/22 1051             Time Calculation- OT    OT Received On 09/13/22  -AV      OT Goal Re-Cert Due Date 10/13/22  -AV              Timed Charges    06399 - OT Self Care/Mgmt Minutes 25  -AV              Untimed Charges    OT  Eval/Re-eval Minutes 35  -AV              SNF Occupational Therapy Minutes    Skilled Minutes- OT 25 min  -AV              Total Minutes    Timed Charges Total Minutes 25  -AV      Untimed Charges Total Minutes 35  -AV       Total Minutes 60  -AV            User Key  (r) = Recorded By, (t) = Taken By, (c) = Cosigned By    Initials Name Provider Type    AV Tray Marcelo OT Occupational Therapist              Therapy Charges for Today     Code Description Service Date Service Provider Modifiers Qty    [id number]  OT SELF CARE/MGMT/TRAIN EA 15 MIN 9/13/2022 Tray Marcelo OT GO 2    [id number]  OT EVAL MOD COMPLEXITY 3 9/13/2022 Tray Marcelo OT GO 1               Tray Marcelo OT  9/13/2022

## 2022-09-13 NOTE — CONSULTS
"Nutrition Services    Patient Name: Hai Gold  YOB: 1955  MRN: [id number]  Admission date: 9/12/2022      CLINICAL NUTRITION ASSESSMENT      Reason for Assessment  Admission assessment, Physician consult     H&P:    Past Medical History:   Diagnosis Date   • Arthritis    • CHF (congestive heart failure) (HCC)    • Hypertension         Current Problems:   Active Hospital Problems    Diagnosis    • Generalized weakness         Nutrition/Diet History         Narrative     RD assessed pt 2' to SNF admission. Pt's documented wt shows significant wt loss. Pt has variable intake between 25-75%. Pt meets severe malnutrition criteria. RD recommended dietary supplement TID to optimize nutrient intake and promote wt maintenance. Pt is agreeable. RD will continue to monitor per protocol.      Anthropometrics        Current Height, Weight Height: 170.2 cm (67.01\")  Weight: 53 kg (116 lb 13.5 oz)   Current BMI Body mass index is 18.3 kg/m².       Weight Hx  Wt Readings from Last 30 Encounters:   09/13/22 1022 53 kg (116 lb 13.5 oz)   09/13/22 0500 53 kg (116 lb 13.5 oz)   09/12/22 1702 53.6 kg (118 lb 2.7 oz)   09/10/22 1657 60.9 kg (134 lb 4.2 oz)   09/10/22 0746 56.2 kg (123 lb 14.4 oz)   09/07/22 1621 55.7 kg (122 lb 12.7 oz)   08/14/22 0500 61 kg (134 lb 7.7 oz)   08/13/22 0500 61 kg (134 lb 7.7 oz)   08/11/22 0935 62.6 kg (138 lb 0.1 oz)   08/11/22 0434 61.9 kg (136 lb 7.4 oz)   06/27/22 0827 58.2 kg (128 lb 4.9 oz)   05/30/22 1120 56.2 kg (123 lb 14.4 oz)   04/11/22 1050 55.6 kg (122 lb 9.2 oz)   03/26/22 2103 57.1 kg (125 lb 14.1 oz)   03/26/22 1222 57.1 kg (125 lb 14.1 oz)   11/21/19 0000 60.8 kg (134 lb)   09/06/19 0000 54.2 kg (119 lb 8 oz)   08/15/19 0000 55.5 kg (122 lb 4 oz)   03/14/19 0000 59 kg (130 lb)            Wt Change Observation -5% x 1 wk     Estimated/Assessed Needs       Energy Requirements 25-30 kcal/kg IBW   EST Needs (kcal/day) 5920-6169       Protein Requirements 1.0-1.5 g/kg "   EST Daily Needs (g/day) 66-99       Fluid Requirements 1 ml/kcal    Estimated Needs (mL/day) 2423-5892     Labs/Medications         Pertinent Labs Reviewed.   Results from last 7 days   Lab Units 09/10/22  0830 09/07/22  1631   SODIUM mmol/L 135* 138   POTASSIUM mmol/L 4.8 4.8   CHLORIDE mmol/L 96* 98   CO2 mmol/L 25.8 29.3*   BUN mg/dL 22 22   CREATININE mg/dL 1.11 1.20   CALCIUM mg/dL 9.5 9.6   BILIRUBIN mg/dL 0.3 0.4   ALK PHOS U/L 92 88   ALT (SGPT) U/L 7 8   AST (SGOT) U/L 13 13   GLUCOSE mg/dL 96 135*     Results from last 7 days   Lab Units 09/11/22  0450 09/10/22  0830 09/07/22  1631   MAGNESIUM mg/dL  --  2.1 2.2   HEMOGLOBIN g/dL  --  12.3* 12.5*   HEMATOCRIT %  --  38.2 37.6   TRIGLYCERIDES mg/dL 74  --   --        Lab Results   Component Value Date    HGBA1C 5.40 09/11/2022         Pertinent Medications Reviewed.     Current Nutrition Orders & Evaluation of Intake       Oral Nutrition     Current PO Diet Diet Soft Texture; Ground; Thin; Cardiac   Supplement Orders Placed This Encounter      Dietary Nutrition Supplements Boost Plus (Ensure Plus)       Malnutrition Severity Assessment      Patient meets criteria for : Severe Malnutrition         Nutrition Diagnosis         Nutrition Dx Problem 1 Severe malnutrition related to decreased ability to consume sufficient energy as evidenced by unintended wt change., body composition changes. and variable po intake        Nutrition Intervention         Boost Plus TID  +1080 kcal, 42 g PRO/day     Medical Nutrition Therapy/Nutrition Education          Learner     Readiness Patient  Acceptance     Method     Response Explanation  Verbalizes understanding     Monitor/Evaluation        Monitor Per protocol, PO intake, Supplement intake, Weight       Nutrition Discharge Plan         Continue dietary supplementation daily        Electronically signed by:  Frida Crow RD  09/13/22 14:11 EDT

## 2022-09-13 NOTE — PLAN OF CARE
Goal Outcome Evaluation:  Plan of Care Reviewed With: patient        Progress: no change (First session)  Outcome Evaluation: Patient presents with limitations of balance, strength, coordination and endurance/activity tolerance which impede his ability to perform ADL and transfers as prior.  The skills of a therapist will be required to safely and effectively implement treatment plan to restore maximal level of function.    Discharge recommendation: Home with home health

## 2022-09-13 NOTE — THERAPY EVALUATION
SNF - Speech Language Pathology   Swallow Initial Evaluation [facility]     Patient Name: Hai Gold  : 1955  MRN: [id number]  Today's Date: 2022               Admit Date: 2022    Visit Dx:     ICD-10-CM ICD-9-CM   1. Decreased activities of daily living (ADL)  Z78.9 V49.89   2. Dysphagia, oropharyngeal  R13.12 787.22     Patient Active Problem List   Diagnosis   • Pneumonia   • Iron deficiency anemia   • Anemia   • CHF (congestive heart failure) (HCC)   • Acute systolic heart failure (CMS/HCC)   • Cytokine release syndrome, grade 1   • Cytokine release syndrome, grade 2   • Expressive aphasia   • Dysphagia   • Dysarthria   • Generalized weakness     Past Medical History:   Diagnosis Date   • Arthritis    • CHF (congestive heart failure) (HCC)    • Hypertension      Past Surgical History:   Procedure Laterality Date   • COLONOSCOPY     • COLONOSCOPY N/A 2022    Procedure: COLONOSCOPY WITH POLYPECTOMIES;  Surgeon: Min Campbell MD;  Location: [facility] ENDOSCOPY;  Service: Gastroenterology;  Laterality: N/A;  COLON POLYPS, DIVERTICULOSIS   • ENDOSCOPY N/A 2022    Procedure: ESOPHAGOGASTRODUODENOSCOPY WITH BIOPSIES;  Surgeon: Min Campbell MD;  Location: [facility] ENDOSCOPY;  Service: Gastroenterology;  Laterality: N/A;  HIATAL HERNIA   • HEMORRHOIDECTOMY     PAIN SCALE: None indicated.    PRECAUTIONS/CONTRAINDICATIONS: Standard    SUSPECTED ABUSE/NEGLECT/EXPLOITATION: None indicated.    SOCIAL/PSYCHOLOGICAL NEEDS/BARRIERS: None indicated.    PAST SOCIAL HISTORY: 67-year-old male lives at home with his wife.     PRIOR FUNCTION: Independent    PATIENT GOALS/EXPECTATIONS: Did not report    HISTORY: 67-year-old male with the above diagnosis referred for speech therapy evaluation to assess for swallowing.  Prior modified barium swallow in 2022 negative for aspiration however did present with some pharyngeal residue after the swallow.  Was recommended regular diet and  thin liquids with some compensatory strategies to double swallow and alternate solids and liquids.      CURRENT DIET LEVEL: Regular    OBJECTIVE:    TEST ADMINISTERED: Clinical dysphagia evaluation    COGNITION/SAFETY AWARENESS: Patient follow directions and answer questions without difficulty    BEHAVIORAL OBSERVATIONS: Alert and cooperative    ORAL MOTOR EXAM: Grossly within functional limits.  Patient was noted with decreased strength and coordination for laryngeal elevation.    VOICE QUALITY: Mild hoarseness    REFLEX EXAM: Deferred    POSTURE: Sitting upright in bed    FEEDING/SWALLOWING FUNCTION: Assessed with nectar liquid, thin liquids, puréed solids, soft and regular solid.     CLINICAL OBSERVATIONS: Oral stage is characterized by good bolus preparation and control.  Mildly prolonged mastication with regular solids.   No oral residue noted after the swallow.  Pharyngeal phase appears timely however does appear to have some reduced laryngeal elevation per palpation.  Reports globus sensation after the swallow with solids, liquid wash and double swallow are effective in minimizing sensation.  No overt clinical signs or symptoms of aspiration are noted.    DYSPHAGIA CRITERIA: Signs of pharyngeal dysphagia, decreased pharyngeal strength, decreased laryngeal elevation.     FUNCTIONAL ASSESSMENT INSTRUMENT: Patient currently scored a level 5 of 7 on Functional Communication Measures for swallowing indicating a 20-39% limitation in function.    ASSESSMENT/ PLAN OF CARE:  Pt presents with limitations, noted below, that impede his ability to swallow safely and maintain nutrition. The skills of a therapist will be required to safely and effectively implement the following treatment plan to restore maximal level of function.    PROBLEMS:  1.   Swallow delay, decreased laryngeal elevation, decreased pharyngeal strength, risk of aspiration                       LTG 1: 30 days: Patient will demonstrate adequate intake  with least restrictive diet utilizing strategies with minimal assistance.                       STG 1a: 14 days: Patient will tolerate diet of regular soft solids and thin liquid utilizing appropriate positioning and strategies with minimal assistance.                       STG 1b: 14 days: Patient will participate in oral motor, pharyngeal and laryngeal exercise program with strength/range of motion 4/5.                       STG 1c: 14 days: Patient/family education.                       TREATMENT: Dysphagia therapy to address swallow function through exercises and education of strategies.     TREATMENT: Dysphagia therapy to address swallow function through exercises and education of strategies.    FREQUENCY/DURATION: daily 5 times per week    REHAB POTENTIAL:  Pt has fair to good rehab potential.  The following limitations may influence improvement/ length of tx: Medical status.    RECOMMENDATIONS:   1.   DIET: Mechanical soft diet  with additional sauces and gravies, thin liquid.    2.  POSITION: Positioning fully upright for all p.o. intake and 1 hour after meals.     3.  COMPENSATORY STRATEGIES: Alternate small bites and small sips of solids and liquids at a slow rate.  Frequent dry swallow.    YES: Pt/responsible party agrees with plan of care and has been informed of all alternatives, risks and benefits.    EDUCATION  The patient has been educated in the following areas:   Modified Diet Instruction.                Irma Gardiner, SLP  9/13/2022

## 2022-09-14 PROBLEM — R42 DIZZINESS: Status: ACTIVE | Noted: 2022-09-14

## 2022-09-14 LAB
ALBUMIN SERPL-MCNC: 4.1 G/DL (ref 3.5–5.2)
ALBUMIN/GLOB SERPL: 0.7 G/DL
ALP SERPL-CCNC: 107 U/L (ref 39–117)
ALT SERPL W P-5'-P-CCNC: 15 U/L (ref 1–41)
ANION GAP SERPL CALCULATED.3IONS-SCNC: 12.9 MMOL/L (ref 5–15)
AST SERPL-CCNC: 20 U/L (ref 1–40)
BASOPHILS # BLD AUTO: 0.04 10*3/MM3 (ref 0–0.2)
BASOPHILS NFR BLD AUTO: 0.4 % (ref 0–1.5)
BILIRUB SERPL-MCNC: 0.3 MG/DL (ref 0–1.2)
BUN SERPL-MCNC: 38 MG/DL (ref 8–23)
BUN/CREAT SERPL: 28.4 (ref 7–25)
CALCIUM SPEC-SCNC: 9.5 MG/DL (ref 8.6–10.5)
CHLORIDE SERPL-SCNC: 94 MMOL/L (ref 98–107)
CO2 SERPL-SCNC: 26.1 MMOL/L (ref 22–29)
CREAT SERPL-MCNC: 1.34 MG/DL (ref 0.76–1.27)
DEPRECATED RDW RBC AUTO: 47.9 FL (ref 37–54)
EGFRCR SERPLBLD CKD-EPI 2021: 58.1 ML/MIN/1.73
EOSINOPHIL # BLD AUTO: 0.09 10*3/MM3 (ref 0–0.4)
EOSINOPHIL NFR BLD AUTO: 0.8 % (ref 0.3–6.2)
ERYTHROCYTE [DISTWIDTH] IN BLOOD BY AUTOMATED COUNT: 14.4 % (ref 12.3–15.4)
GLOBULIN UR ELPH-MCNC: 5.7 GM/DL
GLUCOSE BLDC GLUCOMTR-MCNC: 118 MG/DL (ref 70–99)
GLUCOSE SERPL-MCNC: 170 MG/DL (ref 65–99)
HCT VFR BLD AUTO: 40.4 % (ref 37.5–51)
HGB BLD-MCNC: 13.7 G/DL (ref 13–17.7)
HU AB SER QL IF: NEGATIVE
HU2 AB SER QL IF: NEGATIVE
IMM GRANULOCYTES # BLD AUTO: 0.03 10*3/MM3 (ref 0–0.05)
IMM GRANULOCYTES NFR BLD AUTO: 0.3 % (ref 0–0.5)
INDURATION: 0 MM (ref 0–10)
LYMPHOCYTES # BLD AUTO: 2.2 10*3/MM3 (ref 0.7–3.1)
LYMPHOCYTES NFR BLD AUTO: 20.2 % (ref 19.6–45.3)
Lab: NORMAL
Lab: NORMAL
MCH RBC QN AUTO: 30.7 PG (ref 26.6–33)
MCHC RBC AUTO-ENTMCNC: 33.9 G/DL (ref 31.5–35.7)
MCV RBC AUTO: 90.6 FL (ref 79–97)
MONOCYTES # BLD AUTO: 0.6 10*3/MM3 (ref 0.1–0.9)
MONOCYTES NFR BLD AUTO: 5.5 % (ref 5–12)
NEUTROPHILS NFR BLD AUTO: 7.93 10*3/MM3 (ref 1.7–7)
NEUTROPHILS NFR BLD AUTO: 72.8 % (ref 42.7–76)
NRBC BLD AUTO-RTO: 0 /100 WBC (ref 0–0.2)
PCA-1 AB SER QL IF: NEGATIVE
PLATELET # BLD AUTO: 423 10*3/MM3 (ref 140–450)
PMV BLD AUTO: 9.4 FL (ref 6–12)
POTASSIUM SERPL-SCNC: 4.1 MMOL/L (ref 3.5–5.2)
PROT SERPL-MCNC: 9.8 G/DL (ref 6–8.5)
QT INTERVAL: 392 MS
RBC # BLD AUTO: 4.46 10*6/MM3 (ref 4.14–5.8)
SODIUM SERPL-SCNC: 133 MMOL/L (ref 136–145)
T4 FREE SERPL-MCNC: 1.38 NG/DL (ref 0.93–1.7)
TB SKIN TEST: NEGATIVE
TROPONIN T SERPL-MCNC: 0.02 NG/ML (ref 0–0.03)
TSH SERPL DL<=0.05 MIU/L-ACNC: 1.63 UIU/ML (ref 0.27–4.2)
WBC NRBC COR # BLD: 10.89 10*3/MM3 (ref 3.4–10.8)

## 2022-09-14 PROCEDURE — 84443 ASSAY THYROID STIM HORMONE: CPT | Performed by: INTERNAL MEDICINE

## 2022-09-14 PROCEDURE — 97110 THERAPEUTIC EXERCISES: CPT

## 2022-09-14 PROCEDURE — 63710000001 ONDANSETRON ODT 4 MG TABLET DISPERSIBLE: Performed by: INTERNAL MEDICINE

## 2022-09-14 PROCEDURE — 97530 THERAPEUTIC ACTIVITIES: CPT

## 2022-09-14 PROCEDURE — 80053 COMPREHEN METABOLIC PANEL: CPT | Performed by: INTERNAL MEDICINE

## 2022-09-14 PROCEDURE — 93010 ELECTROCARDIOGRAM REPORT: CPT | Performed by: INTERNAL MEDICINE

## 2022-09-14 PROCEDURE — 92526 ORAL FUNCTION THERAPY: CPT

## 2022-09-14 PROCEDURE — 97535 SELF CARE MNGMENT TRAINING: CPT

## 2022-09-14 PROCEDURE — 85025 COMPLETE CBC W/AUTO DIFF WBC: CPT | Performed by: INTERNAL MEDICINE

## 2022-09-14 PROCEDURE — 84439 ASSAY OF FREE THYROXINE: CPT | Performed by: INTERNAL MEDICINE

## 2022-09-14 PROCEDURE — 84484 ASSAY OF TROPONIN QUANT: CPT | Performed by: INTERNAL MEDICINE

## 2022-09-14 PROCEDURE — 82962 GLUCOSE BLOOD TEST: CPT

## 2022-09-14 PROCEDURE — 93005 ELECTROCARDIOGRAM TRACING: CPT | Performed by: INTERNAL MEDICINE

## 2022-09-14 RX ORDER — ONDANSETRON 4 MG/1
4 TABLET, ORALLY DISINTEGRATING ORAL ONCE
Status: COMPLETED | OUTPATIENT
Start: 2022-09-14 | End: 2022-09-14

## 2022-09-14 RX ORDER — ALPRAZOLAM 0.25 MG/1
0.25 TABLET ORAL ONCE
Status: COMPLETED | OUTPATIENT
Start: 2022-09-14 | End: 2022-09-14

## 2022-09-14 RX ORDER — MECLIZINE HCL 12.5 MG/1
12.5 TABLET ORAL EVERY 8 HOURS SCHEDULED
Status: DISCONTINUED | OUTPATIENT
Start: 2022-09-14 | End: 2022-09-23 | Stop reason: HOSPADM

## 2022-09-14 RX ADMIN — HYDROCODONE BITARTRATE AND ACETAMINOPHEN 1 TABLET: 5; 325 TABLET ORAL at 21:21

## 2022-09-14 RX ADMIN — PANTOPRAZOLE SODIUM 40 MG: 40 TABLET, DELAYED RELEASE ORAL at 09:45

## 2022-09-14 RX ADMIN — DOCUSATE SODIUM 100 MG: 100 CAPSULE, LIQUID FILLED ORAL at 09:45

## 2022-09-14 RX ADMIN — Medication 10 ML: at 10:17

## 2022-09-14 RX ADMIN — APIXABAN 5 MG: 5 TABLET, FILM COATED ORAL at 09:44

## 2022-09-14 RX ADMIN — ALPRAZOLAM 0.25 MG: 0.25 TABLET ORAL at 10:51

## 2022-09-14 RX ADMIN — ALUMINUM HYDROXIDE, MAGNESIUM HYDROXIDE, AND DIMETHICONE 7.5 ML: 400; 400; 40 SUSPENSION ORAL at 09:45

## 2022-09-14 RX ADMIN — Medication 10 ML: at 21:21

## 2022-09-14 RX ADMIN — APIXABAN 5 MG: 5 TABLET, FILM COATED ORAL at 21:22

## 2022-09-14 RX ADMIN — ATORVASTATIN CALCIUM 80 MG: 40 TABLET, FILM COATED ORAL at 21:22

## 2022-09-14 RX ADMIN — ONDANSETRON 4 MG: 4 TABLET, ORALLY DISINTEGRATING ORAL at 10:50

## 2022-09-14 RX ADMIN — FOLIC ACID 1 MG: 1 TABLET ORAL at 09:45

## 2022-09-14 RX ADMIN — MECLIZINE 12.5 MG: 12.5 TABLET ORAL at 21:22

## 2022-09-14 NOTE — PLAN OF CARE
Goal Outcome Evaluation:           Progress: improving  Outcome Evaluation: Maalox administered at beginning of shift for stomach upset. Patient stated he thought the Boost he drank may have upset stomach. Norco administered for left leg pain. Vital signs stable with irregular heart rate. Rest well through night. Repositioned self in bed.

## 2022-09-14 NOTE — CODE DOCUMENTATION
Patient states the nausea started just a few minutes ago.  Patient has also been reporting dizziness which persists.

## 2022-09-14 NOTE — PROGRESS NOTES
[facility]     Progress Note    Patient Name: Hai Gold  : 1955  MRN: [id number]  Primary Care Physician:  Shelly Mejia MD  Date of admission: 2022      Subjective   Brief summary.  Patient admitted to skilled nursing facility for generalized weakness post strokelike symptoms      HPI:  RRT called this morning.  Patient was bradycardic yesterday and beta-blockers held, this morning again RN called RRT for low heart rate and dizziness.  Patient had extensive work-up and no acute issues except for slow heart rate.  Patient reports he is dizzy when he moves his head and any kind of movement makes him sick with nausea.  No vomiting.  Wife at bedside.  Not eating much at all.  No chest pain or shortness of breath    Review of Systems     Weakness fatigue, poor appetite.  Poor p.o. intake.  No chest pain, no shortness of breath.  Dizziness with head movement.  Slow heart rate      Objective     Vitals:   Temp:  [97.1 °F (36.2 °C)-98.2 °F (36.8 °C)] 98.2 °F (36.8 °C)  Heart Rate:  [34-98] 98  Resp:  [16-29] 27  BP: (109-130)/(54-65) 109/54    Physical Exam :     Elderly cachectic male, not in acute distress.  HEENT with no nystagmus neck supple.  Heart regular.  Pallor noted.  Abdomen soft.  Extremities no edema neuro nonfocal      Result Review:  I have personally reviewed the results from the time of this admission to 2022 18:45 EDT and agree with these findings:  [x]  Laboratory  []  Microbiology  []  Radiology  [x]  EKG/Telemetry   []  Cardiology/Vascular   []  Pathology  []  Old records  []  Other:       Reviewed EKG and labs  Assessment / Plan       Active Hospital Problems:  Active Hospital Problems    Diagnosis    • Dizziness    • Bradycardia    • Generalized weakness    • Dysphagia        Plan:   Patient with episode of dizziness.  Most likely vertiginous.  Also has significant bradycardia.  Beta-blockers on hold.  Also will hold diuretics Lasix as patient weak and dizzy.  At  this point will proceed with basic labs which are already been done and reviewed.  We will also check a thyroid profile.  Consult cardiologist Dr. Matheus Argueta who sees patient.  Further manually based on clinical course lab results and consultant input.       DVT prophylaxis:  Medical and mechanical DVT prophylaxis orders are present.    CODE STATUS:   Code Status (Patient has no pulse and is not breathing): CPR (Attempt to Resuscitate)  Medical Interventions (Patient has pulse or is breathing): Full Support  Release to patient: Routine Release            Electronically signed by Lazaro May MD, 09/14/22, 6:43 PM EDT.

## 2022-09-14 NOTE — CODE DOCUMENTATION
At 0900 patient was being assisted back from bathroom by CNA (Lisa). Patient presented with dizziness, bradycardia, diaphoresis, nausea, and chest tightness. RRT called. Vitals obtained at 0900 were HR 38 to 62 and irregular, /68, Temp 97.5F, Respirations 29, O2 sat 100% on RA, Blood glucose 118. RRT team and  came within 90 seconds to floor. Dr. May notified of RRT. See new med order changes and ICU nurse notes. Patient is stable and is to remain on SNF.

## 2022-09-14 NOTE — THERAPY TREATMENT NOTE
SNF - Occupational Therapy Treatment Note  [facility]    Patient Name: Hai Gold  : 1955    MRN: [id number]                              Today's Date: 2022       Admit Date: 2022    Visit Dx:     ICD-10-CM ICD-9-CM   1. Decreased activities of daily living (ADL)  Z78.9 V49.89   2. Dysphagia, oropharyngeal  R13.12 787.22   3. Difficulty walking  R26.2 719.7     Patient Active Problem List   Diagnosis   • Pneumonia   • Iron deficiency anemia   • Anemia   • CHF (congestive heart failure) (HCC)   • Acute systolic heart failure (CMS/HCC)   • Cytokine release syndrome, grade 1   • Cytokine release syndrome, grade 2   • Expressive aphasia   • Dysphagia   • Dysarthria   • Generalized weakness   • Bradycardia     Past Medical History:   Diagnosis Date   • Arthritis    • CHF (congestive heart failure) (HCC)    • Hypertension      Past Surgical History:   Procedure Laterality Date   • COLONOSCOPY     • COLONOSCOPY N/A 2022    Procedure: COLONOSCOPY WITH POLYPECTOMIES;  Surgeon: Min Campbell MD;  Location: [facility] ENDOSCOPY;  Service: Gastroenterology;  Laterality: N/A;  COLON POLYPS, DIVERTICULOSIS   • ENDOSCOPY N/A 2022    Procedure: ESOPHAGOGASTRODUODENOSCOPY WITH BIOPSIES;  Surgeon: Min Campbell MD;  Location: [facility] ENDOSCOPY;  Service: Gastroenterology;  Laterality: N/A;  HIATAL HERNIA   • HEMORRHOIDECTOMY        General Information     Row Name 22 0750          OT Time and Intention    Document Type therapy note (daily note)  -AV     Mode of Treatment individual therapy;occupational therapy  -AV     Row Name 22 0750          General Information    Existing Precautions/Restrictions fall  -AV     Barriers to Rehab none identified  -AV     Row Name 22 0750          Cognition    Orientation Status (Cognition) --  Alert, pleasant and cooperative.  Receptive to cues for safe positioning and safe transfer techniques during functional ADL session.  -AV      Row Name 09/14/22 0750          Safety Issues, Functional Mobility    Impairments Affecting Function (Mobility) balance;endurance/activity tolerance;strength;coordination  -AV           User Key  (r) = Recorded By, (t) = Taken By, (c) = Cosigned By    Initials Name Provider Type    Tray Parker OT Occupational Therapist                 Mobility/ADL's     Row Name 09/14/22 1105          Bed Mobility    Supine-Sit Struthers (Bed Mobility) independent  -AV     Assistive Device (Bed Mobility) bed rails  -AV     Comment, (Bed Mobility) In preparation for ADLs out of bed.  Continues to require slight increase of time to complete.  -AV     Row Name 09/14/22 1105          Transfers    Transfers bed-chair transfer;other (see comments)  -AV     Comment, (Transfers) CGA/RW with cues for safe hand placement: Transfer to wheelchair x2, walk-in shower with seat/grab bar,edge of bed x1, and recliner x1.  -AV     Bed-Chair Struthers (Transfers) contact guard;verbal cues  -AV     Assistive Device (Bed-Chair Transfers) walker, front-wheeled  -AV     Sit-Stand Struthers (Transfers) contact guard;verbal cues  -AV     Row Name 09/14/22 1105          Sit-Stand Transfer    Assistive Device (Sit-Stand Transfers) walker, front-wheeled  -AV     Comment, (Sit-Stand Transfer) Cues for safe hand placement  -AV     Row Name 09/14/22 1105          Functional Mobility    Functional Mobility- Ind. Level contact guard assist;1 person  -AV     Functional Mobility- Device walker, front-wheeled  -AV     Functional Mobility- Comment Patient ambulated to/from sink for grooming (clean teeth) CGA/RW and cues.  -AV     Row Name 09/14/22 1105          Activities of Daily Living    BADL Assessment/Intervention --  (I) UB shower/dressing with set up and cues for safe positioning.  CGA LB shower/cues for safe positioning and use of grab bar.  CGA/min assist LB dressing (unsteady pulling pants over hips).  Partial groom at sink CGA/RW (requested  to clean teeth twice)  -AV           User Key  (r) = Recorded By, (t) = Taken By, (c) = Cosigned By    Initials Name Provider Type    Tray Parker OT Occupational Therapist               Obj/Interventions     Row Name 09/14/22 1108          Balance    Balance Assessment sitting dynamic balance;standing dynamic balance  -AV     Dynamic Sitting Balance independent  -AV     Dynamic Standing Balance contact guard;minimal assist;verbal cues  -AV     Position/Device Used, Standing Balance walker, front-wheeled  -AV     Balance Interventions sitting;standing;sit to stand;supported;minimal challenge;occupation based/functional task  -AV     Comment, Balance Sit to stand x8 during functional ADL session  -AV           User Key  (r) = Recorded By, (t) = Taken By, (c) = Cosigned By    Initials Name Provider Type    Tray Parker OT Occupational Therapist               Goals/Plan    No documentation.                Clinical Impression     [facility] Name 09/14/22 1109          Pain Scale: FACES Pre/Post-Treatment    Pain: FACES Scale, Pretreatment 0-->no hurt  -AV     Posttreatment Pain Rating 0-->no hurt  -AV     Row Name 09/14/22 1109          Plan of Care Review    Progress improving  -AV     Outcome Evaluation Patient with good active participation in functional ADL session.  Patient showered CGA with cues and was able to complete dressing CGA/min assist with cues for safe positioning.  Patient stood at sink to groom CGA/RW.  He was able to complete all transfers CGA/RW with cues for safe technique.  Continued OT is needed to remediate/compensate for deficits to maximize independence.  -AV     Row Name 09/14/22 1109          Vital Signs    O2 Delivery Pre Treatment room air  -AV     O2 Delivery Intra Treatment room air  -AV     O2 Delivery Post Treatment room air  -AV           User Key  (r) = Recorded By, (t) = Taken By, (c) = Cosigned By    Initials Name Provider Type    Tray Parker OT Occupational Therapist                Outcome Measures     Row Name 09/14/22 1110          How much help from another is currently needed...    Putting on and taking off regular lower body clothing? 3  -AV     Bathing (including washing, rinsing, and drying) 3  -AV     Toileting (which includes using toilet bed pan or urinal) 3  -AV     Putting on and taking off regular upper body clothing 4  -AV     Taking care of personal grooming (such as brushing teeth) 4  -AV     Eating meals 4  -AV     AM-PAC 6 Clicks Score (OT) 21  -AV     Row Name 09/14/22 1110          Optimal Instrument    Bending/Stooping 2  -AV     Standing 2  -AV     Reaching 1  -AV           User Key  (r) = Recorded By, (t) = Taken By, (c) = Cosigned By    Initials Name Provider Type    AV Tray Marcelo OT Occupational Therapist              Section G  Mobility  Dressing - self performance: limited assistance (staff provide guided maneuvering of limbs or other non-weight bearing assistance)  Dressing support/assistance: One person assist  Eating - self performance: independent  Eating support/assistance: No setup or physical help  Toileting - self performance: limited assistance (staff provide guided maneuvering of limbs or other non-weight bearing assistance)  Toileting support/assistance: One person assist  Personal hygiene - self performance: limited assistance (staff provide guided maneuvering of limbs or other non-weight bearing assistance)  Personal hygiene support/assistance: One person assist  Bathing  Bathing - self performance: Physical help with bathing (exclude washing back and hair for patient)  Bathing support/assistance: One person assist     Range of Motion  Upper Extremity: No impairment  Section GG                         Occupational Therapy Education                 Title: PT OT SLP Therapies (In Progress)     Topic: Occupational Therapy (In Progress)     Point: ADL training (Done)     Description:   Instruct learner(s) on proper safety adaptation and  remediation techniques during self care or transfers.   Instruct in proper use of assistive devices.              Learning Progress Summary           Patient Acceptance, E, VU by AV at 9/14/2022 1110    Comment: Need for staff assistance for all standing ADLs/transfers  Safe transfer techniques  Safe positioning ADLs    Acceptance, E, VU by AV at 9/13/2022 1050    Comment: Need for staff assistance for all standing ADLs/transfers  Safe positioning ADLs  Safe transfer techniques                   Point: Home exercise program (Not Started)     Description:   Instruct learner(s) on appropriate technique for monitoring, assisting and/or progressing therapeutic exercises/activities.              Learner Progress:  Not documented in this visit.          Point: Precautions (Done)     Description:   Instruct learner(s) on prescribed precautions during self-care and functional transfers.              Learning Progress Summary           Patient Acceptance, E, VU by AV at 9/14/2022 1110    Comment: Need for staff assistance for all standing ADLs/transfers  Safe transfer techniques  Safe positioning ADLs    Acceptance, E, VU by AV at 9/13/2022 1050    Comment: Need for staff assistance for all standing ADLs/transfers  Safe positioning ADLs  Safe transfer techniques                   Point: Body mechanics (Not Started)     Description:   Instruct learner(s) on proper positioning and spine alignment during self-care, functional mobility activities and/or exercises.              Learner Progress:  Not documented in this visit.                      User Key     Initials Effective Dates Name Provider Type Discipline     06/16/21 -  Tray Marcelo OT Occupational Therapist OT              OT Recommendation and Plan  Planned Therapy Interventions (OT): activity tolerance training, BADL retraining, functional balance retraining, occupation/activity based interventions, patient/caregiver education/training, transfer/mobility  retraining, strengthening exercise  Therapy Frequency (OT): 5 times/wk  Plan of Care Review  Plan of Care Reviewed With: patient  Progress: improving  Outcome Evaluation: Patient with good active participation in functional ADL session.  Patient showered CGA with cues and was able to complete dressing CGA/min assist with cues for safe positioning.  Patient stood at sink to groom CGA/RW.  He was able to complete all transfers CGA/RW with cues for safe technique.  Continued OT is needed to remediate/compensate for deficits to maximize independence.     Time Calculation:    Time Calculation- OT     Row Name 09/14/22 1111             Time Calculation- OT    OT Received On 09/14/22  -AV      OT Goal Re-Cert Due Date 10/13/22  -AV              Timed Charges    59944 - OT Therapeutic Activity Minutes 10  -AV      95127 - OT Self Care/Mgmt Minutes 45  -AV              SNF Occupational Therapy Minutes    Skilled Minutes- OT 55 min  -AV              Total Minutes    Timed Charges Total Minutes 55  -AV       Total Minutes 55  -AV            User Key  (r) = Recorded By, (t) = Taken By, (c) = Cosigned By    Initials Name Provider Type    AV Tray Marcelo OT Occupational Therapist              Therapy Charges for Today     Code Description Service Date Service Provider Modifiers Qty    57046787561 HC OT SELF CARE/MGMT/TRAIN EA 15 MIN 9/13/2022 Tray Marcelo OT GO 2    82538817417 HC OT EVAL MOD COMPLEXITY 3 9/13/2022 Tray Marcelo OT GO 1    21498607798 HC OT THERAPEUTIC ACT EA 15 MIN 9/14/2022 Tray Marcelo OT GO 1    08542557943 HC OT SELF CARE/MGMT/TRAIN EA 15 MIN 9/14/2022 Tray Marcelo OT GO 3               Tray Marcelo OT  9/14/2022

## 2022-09-14 NOTE — CODE DOCUMENTATION
Responded to an RRT.  Patient complaining of chest pain.  Nurse reports that pulse was in the 40's at the time.  Patient occasionally reports pain as squeezing, sometimes reports as sharp.  Points to bilateral chest as position of pain.  Have spoken with Dr. May.  States to do just 1 set of cardiac enzymes.  Patient now complaining of nausea.

## 2022-09-14 NOTE — THERAPY TREATMENT NOTE
SNF - Physical Therapy Treatment Note  [facility]     Patient Name: Hia Gold  : 1955  MRN: [id number]  Today's Date: 2022      Visit Dx:     ICD-10-CM ICD-9-CM   1. Decreased activities of daily living (ADL)  Z78.9 V49.89   2. Dysphagia, oropharyngeal  R13.12 787.22   3. Difficulty walking  R26.2 719.7     Patient Active Problem List   Diagnosis   • Pneumonia   • Iron deficiency anemia   • Anemia   • CHF (congestive heart failure) (HCC)   • Acute systolic heart failure (CMS/HCC)   • Cytokine release syndrome, grade 1   • Cytokine release syndrome, grade 2   • Expressive aphasia   • Dysphagia   • Dysarthria   • Generalized weakness   • Bradycardia     Past Medical History:   Diagnosis Date   • Arthritis    • CHF (congestive heart failure) (HCC)    • Hypertension      Past Surgical History:   Procedure Laterality Date   • COLONOSCOPY     • COLONOSCOPY N/A 2022    Procedure: COLONOSCOPY WITH POLYPECTOMIES;  Surgeon: Min Campbell MD;  Location: [facility] ENDOSCOPY;  Service: Gastroenterology;  Laterality: N/A;  COLON POLYPS, DIVERTICULOSIS   • ENDOSCOPY N/A 2022    Procedure: ESOPHAGOGASTRODUODENOSCOPY WITH BIOPSIES;  Surgeon: Min Campbell MD;  Location: [facility] ENDOSCOPY;  Service: Gastroenterology;  Laterality: N/A;  HIATAL HERNIA   • HEMORRHOIDECTOMY       PT Assessment (last 12 hours)     PT Evaluation and Treatment     Row Name 22 1400          Physical Therapy Time and Intention    Document Type therapy note (daily note)  -WM     Mode of Treatment individual therapy;physical therapy  -WM     Patient Effort good  -     Symptoms Noted During/After Treatment fatigue  -     Row Name 22 1400          Cognition    Affect/Mental Status (Cognition) WFL  -     Row Name 22 1400          Hip (Therapeutic Exercise)    Hip (Therapeutic Exercise) isometric exercises;AAROM (active assistive range of motion)  -     Hip AAROM (Therapeutic Exercise)  bilateral;aBduction;aDduction;supine;10 repetitions;2 sets  -     Hip Isometrics (Therapeutic Exercise) bilateral;gluteal sets;supine;10 repetitions;3 second hold;2 sets  -     Hip Strengthening (Therapeutic Exercise) bilateral;heel slides;supine;10 repetitions;2 sets  Manual resistance  -     Row Name 09/14/22 1400          Knee (Therapeutic Exercise)    Knee (Therapeutic Exercise) isometric exercises;AROM (active range of motion)  -     Knee AROM (Therapeutic Exercise) bilateral;SAQ (short arc quad);supine;10 repetitions;2 sets  -     Knee Isometrics (Therapeutic Exercise) bilateral;quad sets;supine;10 repetitions;2 sets  -     Row Name 09/14/22 1400          Ankle (Therapeutic Exercise)    Ankle AROM (Therapeutic Exercise) bilateral;dorsiflexion;plantarflexion;supine;10 repetitions;2 sets  -     Row Name 09/14/22 1400          Progress Summary (PT)    Progress Toward Functional Goals (PT) progress toward functional goals is fair  -           User Key  (r) = Recorded By, (t) = Taken By, (c) = Cosigned By    Initials Name Provider Type     Florentino Baum PTA Physical Therapist Assistant                Physical Therapy Education                 Title: PT OT SLP Therapies (In Progress)     Topic: Physical Therapy (In Progress)     Point: Mobility training (In Progress)     Learning Progress Summary           Patient Acceptance, E, NR by  at 9/13/2022 1535                   Point: Home exercise program (Not Started)     Learner Progress:  Not documented in this visit.          Point: Body mechanics (Not Started)     Learner Progress:  Not documented in this visit.          Point: Precautions (In Progress)     Learning Progress Summary           Patient Acceptance, E, NR by  at 9/13/2022 1535                               User Key     Initials Effective Dates Name Provider Type Discipline     04/25/21 -  Gissell Chaudhary, MARIAH Physical Therapist PT              PT Recommendation and Plan      Progress Summary (PT)  Progress Toward Functional Goals (PT): progress toward functional goals is fair   Outcome Measures     Row Name 09/14/22 1403 09/13/22 1500          How much help from another person do you currently need...    Turning from your back to your side while in flat bed without using bedrails? 3  -WM 3  -CS     Moving from lying on back to sitting on the side of a flat bed without bedrails? 3  -WM 3  -CS     Moving to and from a bed to a chair (including a wheelchair)? 3  -WM 3  -CS     Standing up from a chair using your arms (e.g., wheelchair, bedside chair)? 3  -WM 3  -CS     Climbing 3-5 steps with a railing? 2  -WM 2  -CS     To walk in hospital room? 3  -WM 3  -CS     AM-PAC 6 Clicks Score (PT) 17  -WM 17  -CS            Functional Assessment    Outcome Measure Options -- AM-PAC 6 Clicks Basic Mobility (PT)  -CS           User Key  (r) = Recorded By, (t) = Taken By, (c) = Cosigned By    Initials Name Provider Type    Florentino Yanes PTA Physical Therapist Assistant    CS Gissell Chaudhary, PT Physical Therapist                 Time Calculation:    PT Charges     Row Name 09/14/22 1359             Time Calculation    PT Received On 09/14/22  -WM              Timed Charges    44077 - PT Therapeutic Exercise Minutes 15  -WM              SNF Physical Therapy Minutes    Skilled Minutes- PT 15 min  -WM              Total Minutes    Timed Charges Total Minutes 15  -WM       Total Minutes 15  -WM            User Key  (r) = Recorded By, (t) = Taken By, (c) = Cosigned By    Initials Name Provider Type     Florentino Baum PTA Physical Therapist Assistant              Therapy Charges for Today     Code Description Service Date Service Provider Modifiers Qty    07456719921  PT THER PROC EA 15 MIN 9/14/2022 Florentino Baum PTA GP 1          PT G-Codes  Outcome Measure Options: AM-PAC 6 Clicks Basic Mobility (PT)  AM-PAC 6 Clicks Score (PT): 17  AM-PAC 6 Clicks Score (OT): 21    Florentino Baum  PTA  9/14/2022

## 2022-09-14 NOTE — THERAPY TREATMENT NOTE
SNF - Speech Language Pathology   Swallow Treatment Note  [facility]     Patient Name: Hai Gold  : 1955  MRN: [id number]  Today's Date: 2022               Admit Date: 2022    Visit Dx:     ICD-10-CM ICD-9-CM   1. Decreased activities of daily living (ADL)  Z78.9 V49.89   2. Dysphagia, oropharyngeal  R13.12 787.22   3. Difficulty walking  R26.2 719.7     Patient Active Problem List   Diagnosis   • Pneumonia   • Iron deficiency anemia   • Anemia   • CHF (congestive heart failure) (HCC)   • Acute systolic heart failure (CMS/HCC)   • Cytokine release syndrome, grade 1   • Cytokine release syndrome, grade 2   • Expressive aphasia   • Dysphagia   • Dysarthria   • Generalized weakness   • Bradycardia     Past Medical History:   Diagnosis Date   • Arthritis    • CHF (congestive heart failure) (HCC)    • Hypertension      Past Surgical History:   Procedure Laterality Date   • COLONOSCOPY     • COLONOSCOPY N/A 2022    Procedure: COLONOSCOPY WITH POLYPECTOMIES;  Surgeon: Min Campbell MD;  Location: [facility] ENDOSCOPY;  Service: Gastroenterology;  Laterality: N/A;  COLON POLYPS, DIVERTICULOSIS   • ENDOSCOPY N/A 2022    Procedure: ESOPHAGOGASTRODUODENOSCOPY WITH BIOPSIES;  Surgeon: Min Campbell MD;  Location: [facility] ENDOSCOPY;  Service: Gastroenterology;  Laterality: N/A;  HIATAL HERNIA   • HEMORRHOIDECTOMY       SPEECH PATHOLOGY DYSPHAGIA TREATMENT    Subjective/Behavioral Observations: Patient seen for dysphagia therapy    Current Diet:  Mechanical soft - thin liquids      Current Strategies: Double swallow, alternate with liquids        Treatment received: Patient seen at bedside during morning meal.  Recommended mechanical soft and thin liquids .  Patient does report improved clearance and denies any globus sensation when swallowing.  Tolerated soft solids without clinical sign or symptom of aspiration or report of globus sensation.  Patient did require min cues for  compensatory strategies.  Patient verbalizing understanding of precautions.      Results of treatment: As stated      Progress toward goals: Progressing      Barriers to Achieving goals: Medical status        Plan of care:/changes in plan: Mechanical soft diet-ground meats with sauce/gravy, thin liquids  /Solids as needed  Alternate solids and liquids  Double swallow  Oral meds in applesauce crushed if needed.  Add Boost Breeze as patient did not like the creamy boost     EDUCATION  The patient has been educated in the following areas:   Dysphagia (Swallowing Impairment).       Treatment Time: 30 min       Irma Gardiner, SLP  9/14/2022

## 2022-09-15 PROCEDURE — 97116 GAIT TRAINING THERAPY: CPT

## 2022-09-15 PROCEDURE — 63710000001 ONDANSETRON ODT 4 MG TABLET DISPERSIBLE: Performed by: INTERNAL MEDICINE

## 2022-09-15 PROCEDURE — 97535 SELF CARE MNGMENT TRAINING: CPT

## 2022-09-15 PROCEDURE — 97110 THERAPEUTIC EXERCISES: CPT

## 2022-09-15 RX ORDER — ONDANSETRON 4 MG/1
4 TABLET, ORALLY DISINTEGRATING ORAL EVERY 6 HOURS PRN
Status: DISCONTINUED | OUTPATIENT
Start: 2022-09-15 | End: 2022-09-23 | Stop reason: HOSPADM

## 2022-09-15 RX ADMIN — Medication 10 ML: at 08:32

## 2022-09-15 RX ADMIN — MECLIZINE 12.5 MG: 12.5 TABLET ORAL at 14:23

## 2022-09-15 RX ADMIN — ATORVASTATIN CALCIUM 80 MG: 40 TABLET, FILM COATED ORAL at 20:56

## 2022-09-15 RX ADMIN — DOCUSATE SODIUM 100 MG: 100 CAPSULE, LIQUID FILLED ORAL at 08:32

## 2022-09-15 RX ADMIN — MECLIZINE 12.5 MG: 12.5 TABLET ORAL at 06:01

## 2022-09-15 RX ADMIN — FOLIC ACID 1 MG: 1 TABLET ORAL at 08:32

## 2022-09-15 RX ADMIN — MECLIZINE 12.5 MG: 12.5 TABLET ORAL at 21:00

## 2022-09-15 RX ADMIN — PANTOPRAZOLE SODIUM 40 MG: 40 TABLET, DELAYED RELEASE ORAL at 08:32

## 2022-09-15 RX ADMIN — Medication 10 ML: at 20:55

## 2022-09-15 RX ADMIN — ONDANSETRON 4 MG: 4 TABLET, ORALLY DISINTEGRATING ORAL at 11:19

## 2022-09-15 RX ADMIN — APIXABAN 5 MG: 5 TABLET, FILM COATED ORAL at 08:32

## 2022-09-15 RX ADMIN — APIXABAN 5 MG: 5 TABLET, FILM COATED ORAL at 20:56

## 2022-09-15 NOTE — CONSULTS
Date of service: 9/15/2022    Reason for consultation: Bradycardia    HPI: A 67-year-old male who was noted to have bradycardia on 9/12/2022.  His heart rate was as low as 42 to 50 bpm for several hours.  He has chronic atrial fibrillation and he was taking metoprolol succinate 50 mg p.o. daily and digoxin 0.1 25 mg p.o. daily.  These medications wear discontinued since 9/13/2022.  He was not on telemetry, but he recovered bradycardia.  His heart rate at this time 63 bpm.  He felt dizzy and lightheaded when he had bradycardia.  He had no chest pain, SOB, palpitations or syncope.  He is totally asymptomatic at this time (cardiac wise).    Review of systems: Fatigue.  No headaches, tinnitus, vertigo, nausea, vomiting, abdominal pain, GI or  bleed.  No TIA or CVA-like symptoms    Past medical and surgical history:    1.  Persistent atrial fibrillation  2.  Oral anticoagulation with Eliquis  3.  Hypertension  4.  Chronic systolic CHF  5.  Degenerative joint disease  6.  Surgeries-procedures: Hemorrhoidectomy, EGD and colonoscopy status post polypectomy    Medications: See the patient's medication list    Allergies: Penicillin    Social history:.  Never smoked.  No illicit drug or alcohol intake.    Family history: Noncontributory    Physical examinations: He was in no pain or respiratory distress.  He is ready to go for rehab session.  Vital signs: Reviewed  Neck: No JVD or carotid bruit.  No thyromegaly  Chest: CTA.  Abdomen: Soft and nontender.  No megalies or masses  Extremities: No edema cyanosis or clubbing.  Pulse intact  Neuro: Alert, oriented x3 no facial droop and speech was clear    Records: Reviewed    Assessment and plan:    1.  Drug-induced bradycardia.  The beta-blocker and digoxin are still on hold.    2.  Chronic systolic CHF with mild reduction of the LV systolic function.  No evidence of acute CHF or acute acute coronary syndrome    3.  Hypertension    4.  Monitor blood pressure and heart rate.   Consider starting him on a low-dose of metoprolol succinate 25 mg p.o. daily, once he totally recovers from bradycardia and has a reasonable heart rate.

## 2022-09-15 NOTE — THERAPY TREATMENT NOTE
SNF - Physical Therapy Treatment Note  [facility]     Patient Name: Hai Gold  : 1955  MRN: [id number]  Today's Date: 9/15/2022      Visit Dx:     ICD-10-CM ICD-9-CM   1. Decreased activities of daily living (ADL)  Z78.9 V49.89   2. Dysphagia, oropharyngeal  R13.12 787.22   3. Difficulty walking  R26.2 719.7     Patient Active Problem List   Diagnosis   • Pneumonia   • Iron deficiency anemia   • Anemia   • CHF (congestive heart failure) (HCC)   • Acute systolic heart failure (CMS/HCC)   • Cytokine release syndrome, grade 1   • Cytokine release syndrome, grade 2   • Expressive aphasia   • Dysphagia   • Dysarthria   • Generalized weakness   • Bradycardia   • Dizziness     Past Medical History:   Diagnosis Date   • Arthritis    • CHF (congestive heart failure) (HCC)    • Hypertension      Past Surgical History:   Procedure Laterality Date   • COLONOSCOPY     • COLONOSCOPY N/A 2022    Procedure: COLONOSCOPY WITH POLYPECTOMIES;  Surgeon: Min Campbell MD;  Location: [facility] ENDOSCOPY;  Service: Gastroenterology;  Laterality: N/A;  COLON POLYPS, DIVERTICULOSIS   • ENDOSCOPY N/A 2022    Procedure: ESOPHAGOGASTRODUODENOSCOPY WITH BIOPSIES;  Surgeon: Min Campbell MD;  Location: [facility] ENDOSCOPY;  Service: Gastroenterology;  Laterality: N/A;  HIATAL HERNIA   • HEMORRHOIDECTOMY       PT Assessment (last 12 hours)     PT Evaluation and Treatment     Row Name 09/15/22 1439          Physical Therapy Time and Intention    Subjective Information no complaints  -WM     Document Type therapy note (daily note)  -WM     Mode of Treatment individual therapy;physical therapy  -WM     Patient Effort good  -WM     Symptoms Noted During/After Treatment fatigue  -WM     Row Name 09/15/22 1439          Pain Scale: FACES Pre/Post-Treatment    Pain: FACES Scale, Pretreatment 0-->no hurt  -WM     Posttreatment Pain Rating 0-->no hurt  -WM     Row Name 09/15/22 143          Cognition    Affect/Mental  Status (Cognition) WNL  -WM     Row Name 09/15/22 1439          Transfers    Sit-Stand [place] (Transfers) contact guard  -WM     Stand-Sit [place] (Transfers) contact guard  -WM     Row Name 09/15/22 1439          Sit-Stand Transfer    Assistive Device (Sit-Stand Transfers) walker, front-wheeled  -WM     Row Name 09/15/22 1439          Stand-Sit Transfer    Assistive Device (Stand-Sit Transfers) walker, front-wheeled  -WM     Row Name 09/15/22 1439          Gait/Stairs (Locomotion)    [place] Level (Gait) contact guard  -WM     Assistive Device (Gait) walker, front-wheeled  -WM     Distance in Feet (Gait) 80 x 2  -WM     Pattern (Gait) 4-point;step-through  -WM     Deviations/Abnormal Patterns (Gait) gait speed decreased;stride length decreased  -     Row Name 09/15/22 1439          Safety Issues, Functional Mobility    Impairments Affecting Function (Mobility) balance;endurance/activity tolerance;strength  -     Row Name 09/15/22 1439          Aerobic Exercise    Time Performed (Aerobic Exercise) Nustep x 15 minutes, level 2  -     Row Name 09/15/22 1439          Progress Summary (PT)    Progress Toward Functional Goals (PT) progress toward functional goals is good  -WM           User Key  (r) = Recorded By, (t) = Taken By, (c) = Cosigned By    Initials Name Provider Type    Florentino Yanes PTA Physical Therapist Assistant                Physical Therapy Education                 Title: PT OT SLP Therapies (In Progress)     Topic: Physical Therapy (In Progress)     Point: Mobility training (In Progress)     Learning Progress Summary           Patient Acceptance, E, NR by CS at 9/13/2022 5270                   Point: Home exercise program (Not Started)     Learner Progress:  Not documented in this visit.          Point: Body mechanics (Not Started)     Learner Progress:  Not documented in this visit.          Point: Precautions (In Progress)     Learning Progress Summary           Patient  Acceptance, E, NR by  at 9/13/2022 1535                               User Key     Initials Effective Dates Name Provider Type Discipline     04/25/21 -  Gissell Chaudhary, MARIAH Physical Therapist PT              PT Recommendation and Plan     Progress Summary (PT)  Progress Toward Functional Goals (PT): progress toward functional goals is good   Outcome Measures     Row Name 09/15/22 1440 09/14/22 1403 09/13/22 1500       How much help from another person do you currently need...    Turning from your back to your side while in flat bed without using bedrails? 3  -WM 3  -WM 3  -CS    Moving from lying on back to sitting on the side of a flat bed without bedrails? 3  -WM 3  -WM 3  -CS    Moving to and from a bed to a chair (including a wheelchair)? 3  -WM 3  -WM 3  -CS    Standing up from a chair using your arms (e.g., wheelchair, bedside chair)? 3  -WM 3  -WM 3  -CS    Climbing 3-5 steps with a railing? 3  -WM 2  -WM 2  -CS    To walk in hospital room? 3  -WM 3  -WM 3  -CS    AM-PAC 6 Clicks Score (PT) 18  -WM 17  -WM 17  -CS       Functional Assessment    Outcome Measure Options -- -- AM-PAC 6 Clicks Basic Mobility (PT)  -          User Key  (r) = Recorded By, (t) = Taken By, (c) = Cosigned By    Initials Name Provider Type    Florentino Yanes PTA Physical Therapist Assistant     Gissell Chaudhary, MARIAH Physical Therapist                 Time Calculation:    PT Charges     Row Name 09/15/22 1437             Time Calculation    PT Received On 09/15/22  -WM              Timed Charges    81971 - PT Therapeutic Exercise Minutes 15  -WM      99912 - Gait Training Minutes  6  -WM      58338 - PT Therapeutic Activity Minutes 2  -WM              SNF Physical Therapy Minutes    Skilled Minutes- PT 23 min  -WM              Total Minutes    Timed Charges Total Minutes 23  -WM       Total Minutes 23  -WM            User Key  (r) = Recorded By, (t) = Taken By, (c) = Cosigned By    Initials Name Provider Type    WM Baum  URSULA Good Physical Therapist Assistant              Therapy Charges for Today     Code Description Service Date Service Provider Modifiers Qty    34684435079 HC PT THER PROC EA 15 MIN 9/14/2022 Florentino Baum, URSULA GP 1    41066713538 HC PT THER PROC EA 15 MIN 9/15/2022 Florentino Baum, URSULA GP 1    94380145722 HC GAIT TRAINING EA 15 MIN 9/15/2022 Florentino Baum PTA GP 1          PT G-Codes  Outcome Measure Options: AM-PAC 6 Clicks Basic Mobility (PT)  AM-PAC 6 Clicks Score (PT): 18  AM-PAC 6 Clicks Score (OT): 21    Florentino Baum PTA  9/15/2022

## 2022-09-15 NOTE — PLAN OF CARE
Goal Outcome Evaluation:  Plan of Care Reviewed With: patient, spouse        Progress: no change  Outcome Evaluation: Patient is alert and oriented x4. At 0700 patient stated was felling ok and denied pain. At 0900 while walking back from bathroom, Patient began feeling dizzy and became diaphoretic. Stated was nauseous and his chest felt tight but pain did not radiate anywhere. Pulse irregular and bradycardiac. RRT called. MD and spouse notified. See RRT notes. Patient is now stable and stated nausea and chest tightness have resolved. Con't to have intermittent dizziness. Is one assist with transfers with gait belt and walker. Is to remain on SNF and Dr. Warner is to be consulted. Voices needs. Con't current POC.

## 2022-09-15 NOTE — THERAPY TREATMENT NOTE
SNF - Occupational Therapy Treatment Note  [facility]    Patient Name: Hai Gold  : 1955    MRN: [id number]                              Today's Date: 9/15/2022       Admit Date: 2022    Visit Dx:     ICD-10-CM ICD-9-CM   1. Decreased activities of daily living (ADL)  Z78.9 V49.89   2. Dysphagia, oropharyngeal  R13.12 787.22   3. Difficulty walking  R26.2 719.7     Patient Active Problem List   Diagnosis   • Pneumonia   • Iron deficiency anemia   • Anemia   • CHF (congestive heart failure) (HCC)   • Acute systolic heart failure (CMS/HCC)   • Cytokine release syndrome, grade 1   • Cytokine release syndrome, grade 2   • Expressive aphasia   • Dysphagia   • Dysarthria   • Generalized weakness   • Bradycardia   • Dizziness     Past Medical History:   Diagnosis Date   • Arthritis    • CHF (congestive heart failure) (HCC)    • Hypertension      Past Surgical History:   Procedure Laterality Date   • COLONOSCOPY     • COLONOSCOPY N/A 2022    Procedure: COLONOSCOPY WITH POLYPECTOMIES;  Surgeon: Min Campbell MD;  Location: [facility] ENDOSCOPY;  Service: Gastroenterology;  Laterality: N/A;  COLON POLYPS, DIVERTICULOSIS   • ENDOSCOPY N/A 2022    Procedure: ESOPHAGOGASTRODUODENOSCOPY WITH BIOPSIES;  Surgeon: Min Campbell MD;  Location: [facility] ENDOSCOPY;  Service: Gastroenterology;  Laterality: N/A;  HIATAL HERNIA   • HEMORRHOIDECTOMY        General Information     Row Name 09/15/22 1110          OT Time and Intention    Document Type therapy note (daily note)  -AV     Mode of Treatment individual therapy;occupational therapy  -AV     Row Name 09/15/22 1110          General Information    Existing Precautions/Restrictions fall  -AV     Barriers to Rehab none identified  -AV     Row Name 09/15/22 1110          Cognition    Orientation Status (Cognition) --  Alert, pleasant cooperative.  Receptive to cues for safe techniques during functional ADL session.  -AV     Row Name 09/15/22  1110          Safety Issues, Functional Mobility    Impairments Affecting Function (Mobility) balance;endurance/activity tolerance;strength;coordination  -AV           User Key  (r) = Recorded By, (t) = Taken By, (c) = Cosigned By    Initials Name Provider Type    Tray Parker OT Occupational Therapist                 Mobility/ADL's     Row Name 09/15/22 1111          Bed Mobility    Supine-Sit [place] (Bed Mobility) independent  -AV     Assistive Device (Bed Mobility) bed rails  -AV     Comment, (Bed Mobility) In preparation for ADLs out of bed.  Slowed performance.  -AV     Row Name 09/15/22 1111          Transfers    Transfers sit-stand transfer;bed-chair transfer  -AV     Bed-Chair [place] (Transfers) contact guard;verbal cues  -AV     Assistive Device (Bed-Chair Transfers) walker, front-wheeled  -AV     Sit-Stand [place] (Transfers) contact guard;verbal cues  -AV     Row Name 09/15/22 1111          Sit-Stand Transfer    Assistive Device (Sit-Stand Transfers) walker, front-wheeled  -AV     Comment, (Sit-Stand Transfer) X4 during functional ADL session  -AV     Row Name 09/15/22 1111          Activities of Daily Living    BADL Assessment/Intervention --  Independent UB bathing/dressing was set up while seated.  CGA LB bathing with set up/cues safe positioning.  CGA LB dressing (slipper socks at patient request).  -AV           User Key  (r) = Recorded By, (t) = Taken By, (c) = Cosigned By    Initials Name Provider Type    Tray Parker OT Occupational Therapist               Obj/Interventions     Row Name 09/15/22 1112          Balance    Balance Assessment sitting dynamic balance;standing dynamic balance  -AV     Dynamic Sitting Balance independent  -AV     Dynamic Standing Balance contact guard;verbal cues  -AV     Position/Device Used, Standing Balance walker, front-wheeled  -AV     Balance Interventions sitting;standing;sit to stand;supported;minimal challenge;occupation  based/functional task  -AV           User Key  (r) = Recorded By, (t) = Taken By, (c) = Cosigned By    Initials Name Provider Type    Tray Parker OT Occupational Therapist               Goals/Plan    No documentation.                Clinical Impression     Row Name 09/15/22 1112          Pain Scale: FACES Pre/Post-Treatment    Pain: FACES Scale, Pretreatment 0-->no hurt  -AV     Posttreatment Pain Rating 0-->no hurt  -AV     Pre/Posttreatment Pain Comment Patient reported nausea.  Nurse notified  -AV     Row Name 09/15/22 1112          Plan of Care Review    Progress improving  -AV     Outcome Evaluation Patient reporting fatigue this morning as he did not sleep well last night.  He continues to progress towards functional ADL goals.  Continued OT is needed to remediate/compensate for deficits to maximize independence.  -AV     Row Name 09/15/22 1112          Vital Signs    O2 Delivery Pre Treatment room air  -AV     O2 Delivery Intra Treatment room air  -AV     O2 Delivery Post Treatment room air  -AV           User Key  (r) = Recorded By, (t) = Taken By, (c) = Cosigned By    Initials Name Provider Type    Tray Parker OT Occupational Therapist               Outcome Measures     Row Name 09/15/22 1113          How much help from another is currently needed...    Putting on and taking off regular lower body clothing? 3  -AV     Bathing (including washing, rinsing, and drying) 3  -AV     Toileting (which includes using toilet bed pan or urinal) 3  -AV     Putting on and taking off regular upper body clothing 4  -AV     Taking care of personal grooming (such as brushing teeth) 4  -AV     Eating meals 4  -AV     AM-PAC 6 Clicks Score (OT) 21  -AV     Row Name 09/15/22 1113          Optimal Instrument    Bending/Stooping 2  -AV     Standing 2  -AV     Reaching 1  -AV           User Key  (r) = Recorded By, (t) = Taken By, (c) = Cosigned By    Initials Name Provider Type    Tray Parker OT Occupational  Therapist              Section G  Mobility  Dressing - self performance: limited assistance (staff provide guided maneuvering of limbs or other non-weight bearing assistance)  Dressing support/assistance: One person assist  Eating - self performance: independent  Eating support/assistance: No setup or physical help  Toileting - self performance: limited assistance (staff provide guided maneuvering of limbs or other non-weight bearing assistance)  Toileting support/assistance: One person assist  Personal hygiene - self performance: limited assistance (staff provide guided maneuvering of limbs or other non-weight bearing assistance)  Personal hygiene support/assistance: One person assist  Bathing  Bathing - self performance: Physical help with bathing (exclude washing back and hair for patient)  Bathing support/assistance: One person assist     Range of Motion  Upper Extremity: No impairment  Section GG                         Occupational Therapy Education                 Title: PT OT SLP Therapies (In Progress)     Topic: Occupational Therapy (In Progress)     Point: ADL training (Done)     Description:   Instruct learner(s) on proper safety adaptation and remediation techniques during self care or transfers.   Instruct in proper use of assistive devices.              Learning Progress Summary           Patient Acceptance, E, VU by AV at 9/15/2022 1114    Comment: Need for staff assistance for all standing ADLs/transfers  Safe transfer techniques  Safe positioning during ADLs    Acceptance, E, VU by AV at 9/14/2022 1110    Comment: Need for staff assistance for all standing ADLs/transfers  Safe transfer techniques  Safe positioning ADLs    Acceptance, E, VU by AV at 9/13/2022 1050    Comment: Need for staff assistance for all standing ADLs/transfers  Safe positioning ADLs  Safe transfer techniques                   Point: Home exercise program (Not Started)     Description:   Instruct learner(s) on appropriate  technique for monitoring, assisting and/or progressing therapeutic exercises/activities.              Learner Progress:  Not documented in this visit.          Point: Precautions (Done)     Description:   Instruct learner(s) on prescribed precautions during self-care and functional transfers.              Learning Progress Summary           Patient Acceptance, E, VU by AV at 9/14/2022 1110    Comment: Need for staff assistance for all standing ADLs/transfers  Safe transfer techniques  Safe positioning ADLs    Acceptance, E, VU by AV at 9/13/2022 1050    Comment: Need for staff assistance for all standing ADLs/transfers  Safe positioning ADLs  Safe transfer techniques                   Point: Body mechanics (Not Started)     Description:   Instruct learner(s) on proper positioning and spine alignment during self-care, functional mobility activities and/or exercises.              Learner Progress:  Not documented in this visit.                      User Key     Initials Effective Dates Name Provider Type Discipline    AV 06/16/21 -  Tray Marcelo OT Occupational Therapist OT              OT Recommendation and Plan  Planned Therapy Interventions (OT): activity tolerance training, BADL retraining, functional balance retraining, occupation/activity based interventions, patient/caregiver education/training, transfer/mobility retraining, strengthening exercise  Therapy Frequency (OT): 5 times/wk  Plan of Care Review  Plan of Care Reviewed With: patient  Progress: improving  Outcome Evaluation: Patient reporting fatigue this morning as he did not sleep well last night.  He continues to progress towards functional ADL goals.  Continued OT is needed to remediate/compensate for deficits to maximize independence.     Time Calculation:    Time Calculation- OT     Row Name 09/15/22 1114             Time Calculation- OT    OT Received On 09/15/22  -AV      OT Goal Re-Cert Due Date 10/13/22  -AV              Timed Charges     67666 - OT Self Care/Mgmt Minutes 25  -AV              SNF Occupational Therapy Minutes    Skilled Minutes- OT 25 min  -AV              Total Minutes    Timed Charges Total Minutes 25  -AV       Total Minutes 25  -AV            User Key  (r) = Recorded By, (t) = Taken By, (c) = Cosigned By    Initials Name Provider Type    Tray Parker OT Occupational Therapist              Therapy Charges for Today     Code Description Service Date Service Provider Modifiers Qty    88724242482 HC OT THERAPEUTIC ACT EA 15 MIN 9/14/2022 Tray Marcelo OT GO 1    59654593924 HC OT SELF CARE/MGMT/TRAIN EA 15 MIN 9/14/2022 Tray Marcelo OT GO 3    37088461033 HC OT SELF CARE/MGMT/TRAIN EA 15 MIN 9/15/2022 Tray Marcelo OT GO 2               Tray Marcelo OT  9/15/2022

## 2022-09-15 NOTE — PLAN OF CARE
Goal Outcome Evaluation:  Alert and oriented and pleasant with staff. X1 assist for transfers and ambulation. X1 c/o nausea, PRN antiemetic admin, relief of symptoms noted. No c/o pain noted this shift. Sitting up in recliner, family at bedside, call light in reach.

## 2022-09-15 NOTE — PLAN OF CARE
Goal Outcome Evaluation:           Progress: improving  Outcome Evaluation: [place] administered at HS for left left pain. No chest pain or dizziness this shift. Vital signs stable. Alert and oriented. Ambulated to bathroom with rolling walker, gait belt, and one person assist. Repostioned self and rested well during night.

## 2022-09-16 LAB — VGCC AB SER-SCNC: 12.9 PMOL/L (ref 0–30)

## 2022-09-16 PROCEDURE — 92526 ORAL FUNCTION THERAPY: CPT

## 2022-09-16 PROCEDURE — 97116 GAIT TRAINING THERAPY: CPT

## 2022-09-16 PROCEDURE — 97110 THERAPEUTIC EXERCISES: CPT

## 2022-09-16 RX ORDER — METOPROLOL SUCCINATE 25 MG/1
25 TABLET, EXTENDED RELEASE ORAL
Status: DISCONTINUED | OUTPATIENT
Start: 2022-09-16 | End: 2022-09-23 | Stop reason: HOSPADM

## 2022-09-16 RX ADMIN — FOLIC ACID 1 MG: 1 TABLET ORAL at 09:18

## 2022-09-16 RX ADMIN — MECLIZINE 12.5 MG: 12.5 TABLET ORAL at 14:34

## 2022-09-16 RX ADMIN — Medication 10 ML: at 21:15

## 2022-09-16 RX ADMIN — ATORVASTATIN CALCIUM 80 MG: 40 TABLET, FILM COATED ORAL at 21:15

## 2022-09-16 RX ADMIN — Medication 10 ML: at 09:18

## 2022-09-16 RX ADMIN — PANTOPRAZOLE SODIUM 40 MG: 40 TABLET, DELAYED RELEASE ORAL at 09:18

## 2022-09-16 RX ADMIN — APIXABAN 5 MG: 5 TABLET, FILM COATED ORAL at 21:15

## 2022-09-16 RX ADMIN — METOPROLOL SUCCINATE 25 MG: 25 TABLET, EXTENDED RELEASE ORAL at 11:57

## 2022-09-16 RX ADMIN — MECLIZINE 12.5 MG: 12.5 TABLET ORAL at 05:55

## 2022-09-16 RX ADMIN — DOCUSATE SODIUM 100 MG: 100 CAPSULE, LIQUID FILLED ORAL at 09:18

## 2022-09-16 RX ADMIN — APIXABAN 5 MG: 5 TABLET, FILM COATED ORAL at 09:18

## 2022-09-16 RX ADMIN — MECLIZINE 12.5 MG: 12.5 TABLET ORAL at 21:15

## 2022-09-16 NOTE — THERAPY TREATMENT NOTE
SNF - Occupational Therapy Treatment Note  [facility]    Patient Name: Hai Gold  : 1955    MRN: [id number]                              Today's Date: 2022       Admit Date: 2022    Visit Dx:     ICD-10-CM ICD-9-CM   1. Decreased activities of daily living (ADL)  Z78.9 V49.89   2. Dysphagia, oropharyngeal  R13.12 787.22   3. Difficulty walking  R26.2 719.7     Patient Active Problem List   Diagnosis   • Pneumonia   • Iron deficiency anemia   • Anemia   • CHF (congestive heart failure) (HCC)   • Acute systolic heart failure (CMS/HCC)   • Cytokine release syndrome, grade 1   • Cytokine release syndrome, grade 2   • Expressive aphasia   • Dysphagia   • Dysarthria   • Generalized weakness   • Bradycardia   • Dizziness     Past Medical History:   Diagnosis Date   • Arthritis    • CHF (congestive heart failure) (HCC)    • Hypertension      Past Surgical History:   Procedure Laterality Date   • COLONOSCOPY     • COLONOSCOPY N/A 2022    Procedure: COLONOSCOPY WITH POLYPECTOMIES;  Surgeon: Min Campbell MD;  Location: [facility] ENDOSCOPY;  Service: Gastroenterology;  Laterality: N/A;  COLON POLYPS, DIVERTICULOSIS   • ENDOSCOPY N/A 2022    Procedure: ESOPHAGOGASTRODUODENOSCOPY WITH BIOPSIES;  Surgeon: Min Campbell MD;  Location: [facility] ENDOSCOPY;  Service: Gastroenterology;  Laterality: N/A;  HIATAL HERNIA   • HEMORRHOIDECTOMY        General Information     Row Name 22 1214          OT Time and Intention    Document Type therapy note (daily note)  -AV     Mode of Treatment individual therapy;occupational therapy  -AV     Row Name 22 1214          General Information    Existing Precautions/Restrictions fall  -AV     Barriers to Rehab none identified  -AV     Row Name 22 1214          Cognition    Orientation Status (Cognition) --  Alert, pleasant and cooperative.  Able to perform exercises with minimal cues and demonstration.  -AV     Row Name 22 1214  "         Safety Issues, Functional Mobility    Impairments Affecting Function (Mobility) balance;endurance/activity tolerance;strength  -AV           User Key  (r) = Recorded By, (t) = Taken By, (c) = Cosigned By    Initials Name Provider Type    Tray Parker OT Occupational Therapist                 Mobility/ADL's    No documentation.                Obj/Interventions     [facility] Name 09/16/22 1215          Shoulder (Therapeutic Exercise)    Shoulder (Therapeutic Exercise) strengthening exercise  -AV     Shoulder Strengthening (Therapeutic Exercise) bilateral;flexion;horizontal aBduction/aDduction;2 lb free weight;15 repititions  2# wand: Anterior deltoid raises x15. 2# weights: Horizontal ABD/adduction and anterior deltoid raises x15.  -AV     Row Name 09/16/22 1215          Elbow/Forearm (Therapeutic Exercise)    Elbow/Forearm (Therapeutic Exercise) strengthening exercise  -AV     Elbow/Forearm Strengthening (Therapeutic Exercise) bilateral;flexion;extension;supination;pronation;2 lb free weight;15 repititions  2# wand: Bicep curls, tricep press x15. 2# weights: Pronation/supination, bicep curls x15.  -AV     Row Name 09/16/22 1215          Motor Skills    Therapeutic Exercise shoulder;elbow/forearm  Patient performed BUE exercises in high Masters's/room air.  -AV           User Key  (r) = Recorded By, (t) = Taken By, (c) = Cosigned By    Initials Name Provider Type    Tray Parker OT Occupational Therapist               Goals/Plan    No documentation.                Clinical Impression     [facility] Name 09/16/22 1217          Pain Scale: FACES Pre/Post-Treatment    Pain: FACES Scale, Pretreatment 0-->no hurt  -AV     Posttreatment Pain Rating 0-->no hurt  -AV     [facility] Name 09/16/22 1217          Plan of Care Review    Progress no change  -AV     Outcome Evaluation Patient performed BUE strengthening exercises with 2 pound resistance.  Rest breaks required.  Patient stated \"I feel that\" several times during " session.  Continued OT is needed to remediate/compensate for deficits to maximize independence.  -AV     Row Name 09/16/22 1217          Vital Signs    O2 Delivery Pre Treatment room air  -AV     O2 Delivery Intra Treatment room air  -AV     O2 Delivery Post Treatment room air  -AV           User Key  (r) = Recorded By, (t) = Taken By, (c) = Cosigned By    Initials Name Provider Type    Tray Parker OT Occupational Therapist               Outcome Measures     Row Name 09/16/22 1218          How much help from another is currently needed...    Putting on and taking off regular lower body clothing? 3  -AV     Bathing (including washing, rinsing, and drying) 3  -AV     Toileting (which includes using toilet bed pan or urinal) 3  -AV     Putting on and taking off regular upper body clothing 4  -AV     Taking care of personal grooming (such as brushing teeth) 4  -AV     Eating meals 4  -AV     AM-PAC 6 Clicks Score (OT) 21  -AV     Row Name 09/16/22 1200          How much help from another person do you currently need...    Turning from your back to your side while in flat bed without using bedrails? 3  -WM     Moving from lying on back to sitting on the side of a flat bed without bedrails? 3  -WM     Moving to and from a bed to a chair (including a wheelchair)? 3  -WM     Standing up from a chair using your arms (e.g., wheelchair, bedside chair)? 4  -WM     Climbing 3-5 steps with a railing? 3  -WM     To walk in hospital room? 3  -WM     AM-PAC 6 Clicks Score (PT) 19  -WM     Highest level of mobility 6 --> Walked 10 steps or more  -     Row Name 09/16/22 1218          Optimal Instrument    Bending/Stooping 2  -AV     Standing 2  -AV     Reaching 1  -AV           User Key  (r) = Recorded By, (t) = Taken By, (c) = Cosigned By    Initials Name Provider Type    Florentino Yanes PTA Physical Therapist Assistant    Tray Parker OT Occupational Therapist              Section G  Mobility  Dressing - self  performance: limited assistance (staff provide guided maneuvering of limbs or other non-weight bearing assistance)  Dressing support/assistance: One person assist  Eating - self performance: independent  Eating support/assistance: No setup or physical help  Toileting - self performance: limited assistance (staff provide guided maneuvering of limbs or other non-weight bearing assistance)  Toileting support/assistance: One person assist  Personal hygiene - self performance: limited assistance (staff provide guided maneuvering of limbs or other non-weight bearing assistance)  Personal hygiene support/assistance: One person assist  Bathing  Bathing - self performance: Physical help with bathing (exclude washing back and hair for patient)  Bathing support/assistance: One person assist     Range of Motion  Upper Extremity: No impairment  Section GG                         Occupational Therapy Education                 Title: PT OT SLP Therapies (In Progress)     Topic: Occupational Therapy (In Progress)     Point: ADL training (Done)     Description:   Instruct learner(s) on proper safety adaptation and remediation techniques during self care or transfers.   Instruct in proper use of assistive devices.              Learning Progress Summary           Patient Acceptance, E, VU by AV at 9/15/2022 1114    Comment: Need for staff assistance for all standing ADLs/transfers  Safe transfer techniques  Safe positioning during ADLs    Acceptance, E, VU by AV at 9/14/2022 1110    Comment: Need for staff assistance for all standing ADLs/transfers  Safe transfer techniques  Safe positioning ADLs    Acceptance, E, VU by AV at 9/13/2022 1050    Comment: Need for staff assistance for all standing ADLs/transfers  Safe positioning ADLs  Safe transfer techniques                   Point: Home exercise program (Not Started)     Description:   Instruct learner(s) on appropriate technique for monitoring, assisting and/or progressing  "therapeutic exercises/activities.              Learner Progress:  Not documented in this visit.          Point: Precautions (Done)     Description:   Instruct learner(s) on prescribed precautions during self-care and functional transfers.              Learning Progress Summary           Patient Acceptance, E, VU by AV at 9/14/2022 1110    Comment: Need for staff assistance for all standing ADLs/transfers  Safe transfer techniques  Safe positioning ADLs    Acceptance, E, VU by AV at 9/13/2022 1050    Comment: Need for staff assistance for all standing ADLs/transfers  Safe positioning ADLs  Safe transfer techniques                   Point: Body mechanics (Not Started)     Description:   Instruct learner(s) on proper positioning and spine alignment during self-care, functional mobility activities and/or exercises.              Learner Progress:  Not documented in this visit.                      User Key     Initials Effective Dates Name Provider Type Discipline     06/16/21 -  Tray Marcelo OT Occupational Therapist OT              OT Recommendation and Plan  Planned Therapy Interventions (OT): activity tolerance training, BADL retraining, functional balance retraining, occupation/activity based interventions, patient/caregiver education/training, transfer/mobility retraining, strengthening exercise  Therapy Frequency (OT): 5 times/wk  Plan of Care Review  Plan of Care Reviewed With: patient  Progress: no change  Outcome Evaluation: Patient performed BUE strengthening exercises with 2 pound resistance.  Rest breaks required.  Patient stated \"I feel that\" several times during session.  Continued OT is needed to remediate/compensate for deficits to maximize independence.     Time Calculation:    Time Calculation- OT     Row Name 09/16/22 1218 09/16/22 1156          Time Calculation- OT    OT Received On 09/16/22  -HUMBLE --     OT Goal Re-Cert Due Date 10/13/22  -HUMBLE --            Timed Charges    41538 - OT Therapeutic " Exercise Minutes 25  -AV --     68700 - Gait Training Minutes  -- 7  -WM            SNF Occupational Therapy Minutes    Skilled Minutes- OT 25 min  -AV --            Total Minutes    Timed Charges Total Minutes 25  -AV 7  -WM      Total Minutes 25  -AV 7  -WM           User Key  (r) = Recorded By, (t) = Taken By, (c) = Cosigned By    Initials Name Provider Type    Florentino Yanes PTA Physical Therapist Assistant    Tray Parker OT Occupational Therapist              Therapy Charges for Today     Code Description Service Date Service Provider Modifiers Qty    71165077106  OT SELF CARE/MGMT/TRAIN EA 15 MIN 9/15/2022 Tray Marcelo OT GO 2    [id number]  OT THER PROC EA 15 MIN 9/16/2022 Tray Marcelo OT GO 2               Tray Marcelo OT  9/16/2022

## 2022-09-16 NOTE — PLAN OF CARE
Goal Outcome Evaluation:  Plan of Care Reviewed With: patient        Progress: improving  Outcome Evaluation: Alert and oriented. Complained of leg pain at bedtime but refused pain medication. Ambulating to BR x1 person assist. No complaints of nausea voiced. Continue plan of care. Call light within reach.

## 2022-09-16 NOTE — THERAPY TREATMENT NOTE
SNF - Speech Language Pathology   Swallow Treatment Note  [facility]     Patient Name: Hai Gold  : 1955  MRN: [id number]  Today's Date: 2022               Admit Date: 2022    Visit Dx:     ICD-10-CM ICD-9-CM   1. Decreased activities of daily living (ADL)  Z78.9 V49.89   2. Dysphagia, oropharyngeal  R13.12 787.22   3. Difficulty walking  R26.2 719.7     Patient Active Problem List   Diagnosis   • Pneumonia   • Iron deficiency anemia   • Anemia   • CHF (congestive heart failure) (HCC)   • Acute systolic heart failure (CMS/HCC)   • Cytokine release syndrome, grade 1   • Cytokine release syndrome, grade 2   • Expressive aphasia   • Dysphagia   • Dysarthria   • Generalized weakness   • Bradycardia   • Dizziness     Past Medical History:   Diagnosis Date   • Arthritis    • CHF (congestive heart failure) (HCC)    • Hypertension      Past Surgical History:   Procedure Laterality Date   • COLONOSCOPY     • COLONOSCOPY N/A 2022    Procedure: COLONOSCOPY WITH POLYPECTOMIES;  Surgeon: Min Campbell MD;  Location: [facility] ENDOSCOPY;  Service: Gastroenterology;  Laterality: N/A;  COLON POLYPS, DIVERTICULOSIS   • ENDOSCOPY N/A 2022    Procedure: ESOPHAGOGASTRODUODENOSCOPY WITH BIOPSIES;  Surgeon: Min Campbell MD;  Location: [facility] ENDOSCOPY;  Service: Gastroenterology;  Laterality: N/A;  HIATAL HERNIA   • HEMORRHOIDECTOMY         SPEECH PATHOLOGY DYSPHAGIA TREATMENT    Subjective/Behavioral Observations: Patient seen for dysphagia therapy    Current Diet:  Mechanical soft - thin liquids      Current Strategies: Double swallow, alternate with liquids        Treatment received: Patient seen at bedside during morning meal.  Tolerates mechanical soft and thin liquids .  Patient is able to verbalize and demonstrate compensatory swallow strategies.  No clinical signs or symptoms of aspiration were noted.  Patient denies globus sensation after completion of swallow.  Patient currently  appears at baseline.    Results of treatment: As stated      Progress toward goals: Progressing      Barriers to Achieving goals: Medical status        Plan of care:/changes in plan: Mechanical soft diet-ground meats with sauce/gravy, thin liquids  /Solids as needed  Alternate solids and liquids  Double swallow  Oral meds in applesauce crushed if needed.  Add Boost Breeze as patient did not like the creamy boost     EDUCATION  The patient has been educated in the following areas:   Dysphagia (Swallowing Impairment).       Treatment Time: 30 min       CLAUDIO Panchal  9/16/2022

## 2022-09-16 NOTE — PLAN OF CARE
Goal Outcome Evaluation:   Alert and oriented and pleasant with staff. X1 assist for transfers and ambulation, ambulating to restroom using rolling walker without difficulty. No c/o pain discomfort or dizziness noted this shift. Sitting up in bed, call light in reach.

## 2022-09-16 NOTE — THERAPY TREATMENT NOTE
SNF - Physical Therapy Treatment Note  [facility]     Patient Name: Hai Gold  : 1955  MRN: [id number]  Today's Date: 2022      Visit Dx:     ICD-10-CM ICD-9-CM   1. Decreased activities of daily living (ADL)  Z78.9 V49.89   2. Dysphagia, oropharyngeal  R13.12 787.22   3. Difficulty walking  R26.2 719.7     Patient Active Problem List   Diagnosis   • Pneumonia   • Iron deficiency anemia   • Anemia   • CHF (congestive heart failure) (HCC)   • Acute systolic heart failure (CMS/HCC)   • Cytokine release syndrome, grade 1   • Cytokine release syndrome, grade 2   • Expressive aphasia   • Dysphagia   • Dysarthria   • Generalized weakness   • Bradycardia   • Dizziness     Past Medical History:   Diagnosis Date   • Arthritis    • CHF (congestive heart failure) (HCC)    • Hypertension      Past Surgical History:   Procedure Laterality Date   • COLONOSCOPY     • COLONOSCOPY N/A 2022    Procedure: COLONOSCOPY WITH POLYPECTOMIES;  Surgeon: Min Campbell MD;  Location: [facility] ENDOSCOPY;  Service: Gastroenterology;  Laterality: N/A;  COLON POLYPS, DIVERTICULOSIS   • ENDOSCOPY N/A 2022    Procedure: ESOPHAGOGASTRODUODENOSCOPY WITH BIOPSIES;  Surgeon: Min Campbell MD;  Location: [facility] ENDOSCOPY;  Service: Gastroenterology;  Laterality: N/A;  HIATAL HERNIA   • HEMORRHOIDECTOMY       PT Assessment (last 12 hours)     PT Evaluation and Treatment     Row Name 22 1156          Physical Therapy Time and Intention    Subjective Information no complaints  -WM     Document Type therapy note (daily note)  -WM     Mode of Treatment individual therapy;physical therapy  -WM     Patient Effort good  -WM     Symptoms Noted During/After Treatment fatigue  -WM     Row Name 22 1156          Cognition    Affect/Mental Status (Cognition) WNL  -WM     Row Name 22 1156          Transfers    Sit-Stand [place] (Transfers) standby assist  -WM     Stand-Sit [place] (Transfers)  standby assist  -     Row Name 09/16/22 1156          Sit-Stand Transfer    Assistive Device (Sit-Stand Transfers) walker, front-wheeled  -     Row Name 09/16/22 1156          Stand-Sit Transfer    Assistive Device (Stand-Sit Transfers) walker, front-wheeled  -[facility] Name 09/16/22 1156          Gait/Stairs (Locomotion)    [place] Level (Gait) contact guard  -     Assistive Device (Gait) walker, front-wheeled  -     Distance in Feet (Gait) 80 + 120  -     Pattern (Gait) 4-point;step-through  -     Deviations/Abnormal Patterns (Gait) gait speed decreased;stride length decreased  -     Row Name 09/16/22 1156          Safety Issues, Functional Mobility    Impairments Affecting Function (Mobility) balance;endurance/activity tolerance;strength  -[facility] Name 09/16/22 1156          Hip (Therapeutic Exercise)    Hip Strengthening (Therapeutic Exercise) bilateral;aBduction;aDduction;heel slides;marching while seated;sitting;supine;2 lb free weight;resistance band;green;15 repititions;2 sets  Manual resistance  -[facility] Name 09/16/22 1156          Knee (Therapeutic Exercise)    Knee Strengthening (Therapeutic Exercise) bilateral;SAQ (short arc quad);hamstring curls;sitting;supine;2 lb free weight;green;15 repititions;2 sets  -[facility] Name 09/16/22 1156          Ankle (Therapeutic Exercise)    Ankle AROM (Therapeutic Exercise) bilateral;dorsiflexion;plantarflexion;supine;15 repititions;2 sets  -[facility] Name 09/16/22 1156          Aerobic Exercise    Time Performed (Aerobic Exercise) Nustep x 15 minutes, level 2  -[facility] Name 09/16/22 1156          Progress Summary (PT)    Progress Toward Functional Goals (PT) progress toward functional goals is good  -           User Key  (r) = Recorded By, (t) = Taken By, (c) = Cosigned By    Initials Name Provider Type    Florentino Yanes PTA Physical Therapist Assistant                Physical Therapy Education                 Title: PT OT SLP  Therapies (In Progress)     Topic: Physical Therapy (In Progress)     Point: Mobility training (In Progress)     Learning Progress Summary           Patient Acceptance, E, NR by  at 9/13/2022 1535                   Point: Home exercise program (Not Started)     Learner Progress:  Not documented in this visit.          Point: Body mechanics (Not Started)     Learner Progress:  Not documented in this visit.          Point: Precautions (In Progress)     Learning Progress Summary           Patient Acceptance, E, NR by  at 9/13/2022 1535                               User Key     Initials Effective Dates Name Provider Type Discipline     04/25/21 -  Gissell Chaudhary, PT Physical Therapist PT              PT Recommendation and Plan     Progress Summary (PT)  Progress Toward Functional Goals (PT): progress toward functional goals is good   Outcome Measures     Row Name 09/16/22 1200 09/15/22 1440 09/14/22 1403       How much help from another person do you currently need...    Turning from your back to your side while in flat bed without using bedrails? 3  -WM 3  -WM 3  -WM    Moving from lying on back to sitting on the side of a flat bed without bedrails? 3  -WM 3  -WM 3  -WM    Moving to and from a bed to a chair (including a wheelchair)? 3  -WM 3  -WM 3  -WM    Standing up from a chair using your arms (e.g., wheelchair, bedside chair)? 4  -WM 3  -WM 3  -WM    Climbing 3-5 steps with a railing? 3  -WM 3  -WM 2  -WM    To walk in hospital room? 3  -WM 3  -WM 3  -WM    AM-PAC 6 Clicks Score (PT) 19  -WM 18  -WM 17  -WM    Row Name 09/13/22 1500             How much help from another person do you currently need...    Turning from your back to your side while in flat bed without using bedrails? 3  -CS      Moving from lying on back to sitting on the side of a flat bed without bedrails? 3  -CS      Moving to and from a bed to a chair (including a wheelchair)? 3  -CS      Standing up from a chair using your arms (e.g.,  wheelchair, bedside chair)? 3  -CS      Climbing 3-5 steps with a railing? 2  -CS      To walk in hospital room? 3  -CS      AM-PAC 6 Clicks Score (PT) 17  -CS              Functional Assessment    Outcome Measure Options AM-PAC 6 Clicks Basic Mobility (PT)  -CS            User Key  (r) = Recorded By, (t) = Taken By, (c) = Cosigned By    Initials Name Provider Type     Florentino Baum PTA Physical Therapist Assistant    CS Gissell Chaudhary, PT Physical Therapist                 Time Calculation:    PT Charges     Row Name 09/16/22 1156             Time Calculation    PT Received On 09/16/22  -WM              Timed Charges    23754 - PT Therapeutic Exercise Minutes 31  -WM      81340 - Gait Training Minutes  7  -WM      28359 - PT Therapeutic Activity Minutes 2  -WM              SNF Physical Therapy Minutes    Skilled Minutes- PT 40 min  -WM              Total Minutes    Timed Charges Total Minutes 40  -WM       Total Minutes 40  -WM            User Key  (r) = Recorded By, (t) = Taken By, (c) = Cosigned By    Initials Name Provider Type     Florentino Baum PTA Physical Therapist Assistant              Therapy Charges for Today     Code Description Service Date Service Provider Modifiers Qty    99345722150 HC PT THER PROC EA 15 MIN 9/15/2022 Florentino Baum PTA GP 1    17876202567 HC GAIT TRAINING EA 15 MIN 9/15/2022 Florentino Baum, PTA GP 1    28277272909 HC PT THER PROC EA 15 MIN 9/16/2022 Florentino Baum PTA GP 2    33921944911 HC GAIT TRAINING EA 15 MIN 9/16/2022 Florentino Baum, URSULA GP 1          PT G-Codes  Outcome Measure Options: AM-PAC 6 Clicks Basic Mobility (PT)  AM-PAC 6 Clicks Score (PT): 19  AM-PAC 6 Clicks Score (OT): 21    Florentino Baum PTA  9/16/2022

## 2022-09-16 NOTE — PROGRESS NOTES
Date of service: 9/16/2022    Subjective: Feels much better today.  No chest pain, SOB, palpitations or dizziness    Review of systems: No fatigue, headaches, tinnitus, vertigo, nausea, vomiting, abdominal pain, GI or  bleed.  No TIA or CVA-like symptoms    Physical examinations: He was in no pain or respiratory distress.  He is ready to go for rehab session.  Vital signs: Reviewed  Neck: No JVD or carotid bruit.  No thyromegaly  Chest: CTA.    Cardiac: Regular with occasional irregularities.  No S3 gallop no murmurs.  Abdomen: Soft and nontender.  No megalies or masses  Extremities: No edema cyanosis or clubbing.  Pulse intact  Neuro: Alert, oriented x3 no facial droop and speech was clear     Records: Reviewed     Assessment and plan:    1.  Drug-induced bradycardia, recovering     2.  Chronic systolic CHF with mild reduction of the LV systolic function.  No evidence of acute CHF or acute acute coronary syndrome     3.  Hypertension     4.  Monitor blood pressure and heart rate.    5.  We will change his home medication; metoprolol succinate from 50 mg p.o. daily to 25 mg p.o. daily.    6.  Observe him for another day and he may be discharged in the morning, if he finishes his cardiac rehabilitation program.

## 2022-09-17 PROCEDURE — 97116 GAIT TRAINING THERAPY: CPT

## 2022-09-17 PROCEDURE — 97110 THERAPEUTIC EXERCISES: CPT

## 2022-09-17 RX ADMIN — Medication 10 ML: at 10:00

## 2022-09-17 RX ADMIN — MECLIZINE 12.5 MG: 12.5 TABLET ORAL at 21:09

## 2022-09-17 RX ADMIN — FOLIC ACID 1 MG: 1 TABLET ORAL at 10:00

## 2022-09-17 RX ADMIN — ATORVASTATIN CALCIUM 80 MG: 40 TABLET, FILM COATED ORAL at 21:10

## 2022-09-17 RX ADMIN — Medication 10 ML: at 21:10

## 2022-09-17 RX ADMIN — PANTOPRAZOLE SODIUM 40 MG: 40 TABLET, DELAYED RELEASE ORAL at 10:00

## 2022-09-17 RX ADMIN — METOPROLOL SUCCINATE 25 MG: 25 TABLET, EXTENDED RELEASE ORAL at 10:00

## 2022-09-17 RX ADMIN — APIXABAN 5 MG: 5 TABLET, FILM COATED ORAL at 10:00

## 2022-09-17 RX ADMIN — MECLIZINE 12.5 MG: 12.5 TABLET ORAL at 05:50

## 2022-09-17 RX ADMIN — MECLIZINE 12.5 MG: 12.5 TABLET ORAL at 15:00

## 2022-09-17 RX ADMIN — DOCUSATE SODIUM 100 MG: 100 CAPSULE, LIQUID FILLED ORAL at 10:00

## 2022-09-17 RX ADMIN — APIXABAN 5 MG: 5 TABLET, FILM COATED ORAL at 21:09

## 2022-09-17 NOTE — PLAN OF CARE
Goal Outcome Evaluation:  Plan of Care Reviewed With: patient, spouse        Progress: improving  Outcome Evaluation: Patient is alert and oriented. No c/o dizziness reported. Denies soa. Stated tingling/pain in left leg has improved. Refused PRN pain med offered. Stated is feeling quite a bit better today. Voices needs. Con't current POC.

## 2022-09-17 NOTE — THERAPY TREATMENT NOTE
SNF - Physical Therapy Progress Note  [facility]     Patient Name: Hai Gold  : 1955  MRN: [id number]  Today's Date: 2022      Visit Dx:    ICD-10-CM ICD-9-CM   1. Decreased activities of daily living (ADL)  Z78.9 V49.89   2. Dysphagia, oropharyngeal  R13.12 787.22   3. Difficulty walking  R26.2 719.7     Patient Active Problem List   Diagnosis   • Pneumonia   • Iron deficiency anemia   • Anemia   • CHF (congestive heart failure) (HCC)   • Acute systolic heart failure (CMS/HCC)   • Cytokine release syndrome, grade 1   • Cytokine release syndrome, grade 2   • Expressive aphasia   • Dysphagia   • Dysarthria   • Generalized weakness   • Bradycardia   • Dizziness     Past Medical History:   Diagnosis Date   • Arthritis    • CHF (congestive heart failure) (HCC)    • Hypertension      Past Surgical History:   Procedure Laterality Date   • COLONOSCOPY     • COLONOSCOPY N/A 2022    Procedure: COLONOSCOPY WITH POLYPECTOMIES;  Surgeon: Min Campbell MD;  Location: [facility] ENDOSCOPY;  Service: Gastroenterology;  Laterality: N/A;  COLON POLYPS, DIVERTICULOSIS   • ENDOSCOPY N/A 2022    Procedure: ESOPHAGOGASTRODUODENOSCOPY WITH BIOPSIES;  Surgeon: Min Campbell MD;  Location: [facility] ENDOSCOPY;  Service: Gastroenterology;  Laterality: N/A;  HIATAL HERNIA   • HEMORRHOIDECTOMY         PT Assessment (last 12 hours)     PT Evaluation and Treatment     Row Name 22 1000          Physical Therapy Time and Intention    Subjective Information no complaints  -CS     Document Type therapy note (daily note)  -CS     Mode of Treatment individual therapy;physical therapy  -CS     Patient Effort good  -CS     Symptoms Noted During/After Treatment none  -CS     Row Name 22 1000          Transfers    Sit-Stand [place] (Transfers) standby assist  -CS     Stand-Sit [place] (Transfers) standby assist  -CS     Row Name 22 1000          Sit-Stand Transfer    Assistive Device  (Sit-Stand Transfers) walker, front-wheeled  -CS     Row Name 09/17/22 1000          Stand-Sit Transfer    Assistive Device (Stand-Sit Transfers) walker, front-wheeled  -CS     Row Name 09/17/22 1000          Gait/Stairs (Locomotion)    [place] Level (Gait) standby assist  -     Assistive Device (Gait) walker, front-wheeled  -CS     Distance in Feet (Gait) 550  -CS     Pattern (Gait) 4-point;step-through  -     Bilateral Gait Deviations forward flexed posture  -     Row Name 09/17/22 1000          Hip (Therapeutic Exercise)    Hip (Therapeutic Exercise) AROM (active range of motion)  -     Hip AROM (Therapeutic Exercise) bilateral;sitting;supine;20 repititions  Sitting: Marches; Supine: Hip abd/add, Hip IR/ER, heel slides  -     Hip Isometrics (Therapeutic Exercise) bilateral;gluteal sets;supine;10 repetitions;3 second hold;2 sets  -     Row Name 09/17/22 1000          Knee (Therapeutic Exercise)    Knee AROM (Therapeutic Exercise) bilateral;SLR (straight leg raise);LAQ (long arc quad);sitting;supine;20 repititions  -     Knee Isometrics (Therapeutic Exercise) bilateral;quad sets;supine;10 repetitions;3 second hold;2 sets  -     Row Name 09/17/22 1000          Ankle (Therapeutic Exercise)    Ankle AROM (Therapeutic Exercise) bilateral;dorsiflexion;plantarflexion;supine;20 repititions  -     Row Name 09/17/22 1000          Positioning and Restraints    Pre-Treatment Position sitting in chair/recliner  -     Post Treatment Position chair  -CS     In Chair reclined;call light within reach;encouraged to call for assist  -     Row Name 09/17/22 1000          Progress Summary (PT)    Progress Toward Functional Goals (PT) progress toward functional goals is good  -           User Key  (r) = Recorded By, (t) = Taken By, (c) = Cosigned By    Initials Name Provider Type    CS Jordin Tobar PTA Physical Therapist Assistant              Section G              Section GG                        Physical Therapy Education                 Title: PT OT SLP Therapies (In Progress)     Topic: Physical Therapy (In Progress)     Point: Mobility training (In Progress)     Learning Progress Summary           Patient Acceptance, E, NR by  at 9/13/2022 1535                   Point: Home exercise program (Not Started)     Learner Progress:  Not documented in this visit.          Point: Body mechanics (Not Started)     Learner Progress:  Not documented in this visit.          Point: Precautions (In Progress)     Learning Progress Summary           Patient Acceptance, E, NR by  at 9/13/2022 1535                               User Key     Initials Effective Dates Name Provider Type Discipline     04/25/21 -  Gissell Chaudhary, PT Physical Therapist PT              PT Recommendation and Plan     Progress Summary (PT)  Progress Toward Functional Goals (PT): progress toward functional goals is good   Outcome Measures     Row Name 09/17/22 1000 09/16/22 1200 09/15/22 1440       How much help from another person do you currently need...    Turning from your back to your side while in flat bed without using bedrails? 3  -CS 3  -WM 3  -WM    Moving from lying on back to sitting on the side of a flat bed without bedrails? 3  -CS 3  -WM 3  -WM    Moving to and from a bed to a chair (including a wheelchair)? 3  -CS 3  -WM 3  -WM    Standing up from a chair using your arms (e.g., wheelchair, bedside chair)? 4  -CS 4  -WM 3  -WM    Climbing 3-5 steps with a railing? 3  -CS 3  -WM 3  -WM    To walk in hospital room? 3  -CS 3  -WM 3  -WM    AM-PAC 6 Clicks Score (PT) 19  -CS 19  -WM 18  -WM       Functional Assessment    Outcome Measure Options AM-PAC 6 Clicks Basic Mobility (PT)  - -- --    Row Name 09/14/22 1403             How much help from another person do you currently need...    Turning from your back to your side while in flat bed without using bedrails? 3  -WM      Moving from lying on back to sitting on the side of  a flat bed without bedrails? 3  -WM      Moving to and from a bed to a chair (including a wheelchair)? 3  -WM      Standing up from a chair using your arms (e.g., wheelchair, bedside chair)? 3  -WM      Climbing 3-5 steps with a railing? 2  -WM      To walk in hospital room? 3  -WM      AM-PAC 6 Clicks Score (PT) 17  -WM            User Key  (r) = Recorded By, (t) = Taken By, (c) = Cosigned By    Initials Name Provider Type     Florentino Baum PTA Physical Therapist Assistant     Jordin Tobar PTA Physical Therapist Assistant                 Time Calculation:    PT Charges     Row Name 09/17/22 1041             Time Calculation    Start Time 0903  -CS      PT Received On 09/17/22  -CS              Timed Charges    25559 - PT Therapeutic Exercise Minutes 12  -CS      76140 - Gait Training Minutes  11  -CS              SNF Physical Therapy Minutes    Skilled Minutes- PT 23 min  -CS              Total Minutes    Timed Charges Total Minutes 23  -CS       Total Minutes 23  -CS            User Key  (r) = Recorded By, (t) = Taken By, (c) = Cosigned By    Initials Name Provider Type     Jordin Tobar PTA Physical Therapist Assistant              Therapy Charges for Today     Code Description Service Date Service Provider Modifiers Qty    14954893129 HC GAIT TRAINING EA 15 MIN 9/17/2022 Jordin Tobar PTA GP 1    14617189719 HC PT THER PROC EA 15 MIN 9/17/2022 Jordin Tobar PTA GP 1          PT G-Codes  Outcome Measure Options: AM-PAC 6 Clicks Basic Mobility (PT)  AM-PAC 6 Clicks Score (PT): 19  AM-PAC 6 Clicks Score (OT): 21    Jordin Tobar PTA  9/17/2022

## 2022-09-17 NOTE — PLAN OF CARE
Goal Outcome Evaluation:  Plan of Care Reviewed With: patient        Progress: improving  Outcome Evaluation: Alert and oriented. Ambulating to BR x1 person assist and denies dizziness. No requests for pain medication tonight; stated left leg pain was tolerable. Continue plan of care. Call light within reach.

## 2022-09-18 LAB
ALBUMIN SERPL-MCNC: 3.4 G/DL (ref 3.5–5.2)
ALBUMIN/GLOB SERPL: 0.6 G/DL
ALP SERPL-CCNC: 98 U/L (ref 39–117)
ALT SERPL W P-5'-P-CCNC: 28 U/L (ref 1–41)
ANION GAP SERPL CALCULATED.3IONS-SCNC: 11.8 MMOL/L (ref 5–15)
AST SERPL-CCNC: 28 U/L (ref 1–40)
BASOPHILS # BLD AUTO: 0.05 10*3/MM3 (ref 0–0.2)
BASOPHILS NFR BLD AUTO: 0.5 % (ref 0–1.5)
BILIRUB SERPL-MCNC: 0.3 MG/DL (ref 0–1.2)
BUN SERPL-MCNC: 23 MG/DL (ref 8–23)
BUN/CREAT SERPL: 20.5 (ref 7–25)
CALCIUM SPEC-SCNC: 9.6 MG/DL (ref 8.6–10.5)
CHLORIDE SERPL-SCNC: 97 MMOL/L (ref 98–107)
CO2 SERPL-SCNC: 25.2 MMOL/L (ref 22–29)
CREAT SERPL-MCNC: 1.12 MG/DL (ref 0.76–1.27)
DEPRECATED RDW RBC AUTO: 47.3 FL (ref 37–54)
EGFRCR SERPLBLD CKD-EPI 2021: 72 ML/MIN/1.73
EOSINOPHIL # BLD AUTO: 0.05 10*3/MM3 (ref 0–0.4)
EOSINOPHIL NFR BLD AUTO: 0.5 % (ref 0.3–6.2)
ERYTHROCYTE [DISTWIDTH] IN BLOOD BY AUTOMATED COUNT: 14.4 % (ref 12.3–15.4)
GLOBULIN UR ELPH-MCNC: 5.8 GM/DL
GLUCOSE SERPL-MCNC: 80 MG/DL (ref 65–99)
HCT VFR BLD AUTO: 38.4 % (ref 37.5–51)
HGB BLD-MCNC: 12.9 G/DL (ref 13–17.7)
IMM GRANULOCYTES # BLD AUTO: 0.03 10*3/MM3 (ref 0–0.05)
IMM GRANULOCYTES NFR BLD AUTO: 0.3 % (ref 0–0.5)
LYMPHOCYTES # BLD AUTO: 2.08 10*3/MM3 (ref 0.7–3.1)
LYMPHOCYTES NFR BLD AUTO: 21.2 % (ref 19.6–45.3)
MCH RBC QN AUTO: 30.1 PG (ref 26.6–33)
MCHC RBC AUTO-ENTMCNC: 33.6 G/DL (ref 31.5–35.7)
MCV RBC AUTO: 89.7 FL (ref 79–97)
MONOCYTES # BLD AUTO: 0.64 10*3/MM3 (ref 0.1–0.9)
MONOCYTES NFR BLD AUTO: 6.5 % (ref 5–12)
NEUTROPHILS NFR BLD AUTO: 6.94 10*3/MM3 (ref 1.7–7)
NEUTROPHILS NFR BLD AUTO: 71 % (ref 42.7–76)
NRBC BLD AUTO-RTO: 0 /100 WBC (ref 0–0.2)
PLATELET # BLD AUTO: 396 10*3/MM3 (ref 140–450)
PMV BLD AUTO: 9.9 FL (ref 6–12)
POTASSIUM SERPL-SCNC: 5.7 MMOL/L (ref 3.5–5.2)
PROT SERPL-MCNC: 9.2 G/DL (ref 6–8.5)
RBC # BLD AUTO: 4.28 10*6/MM3 (ref 4.14–5.8)
SODIUM SERPL-SCNC: 134 MMOL/L (ref 136–145)
WBC NRBC COR # BLD: 9.79 10*3/MM3 (ref 3.4–10.8)

## 2022-09-18 PROCEDURE — 85025 COMPLETE CBC W/AUTO DIFF WBC: CPT | Performed by: INTERNAL MEDICINE

## 2022-09-18 PROCEDURE — 80053 COMPREHEN METABOLIC PANEL: CPT | Performed by: INTERNAL MEDICINE

## 2022-09-18 RX ORDER — DOCUSATE SODIUM 100 MG/1
100 CAPSULE, LIQUID FILLED ORAL 2 TIMES DAILY
Status: DISCONTINUED | OUTPATIENT
Start: 2022-09-18 | End: 2022-09-23 | Stop reason: HOSPADM

## 2022-09-18 RX ORDER — BISACODYL 5 MG/1
10 TABLET, DELAYED RELEASE ORAL ONCE
Status: COMPLETED | OUTPATIENT
Start: 2022-09-18 | End: 2022-09-18

## 2022-09-18 RX ADMIN — MECLIZINE 12.5 MG: 12.5 TABLET ORAL at 21:08

## 2022-09-18 RX ADMIN — PANTOPRAZOLE SODIUM 40 MG: 40 TABLET, DELAYED RELEASE ORAL at 09:41

## 2022-09-18 RX ADMIN — METOPROLOL SUCCINATE 25 MG: 25 TABLET, EXTENDED RELEASE ORAL at 09:41

## 2022-09-18 RX ADMIN — DOCUSATE SODIUM 100 MG: 100 CAPSULE, LIQUID FILLED ORAL at 21:08

## 2022-09-18 RX ADMIN — MECLIZINE 12.5 MG: 12.5 TABLET ORAL at 13:58

## 2022-09-18 RX ADMIN — APIXABAN 5 MG: 5 TABLET, FILM COATED ORAL at 21:07

## 2022-09-18 RX ADMIN — FOLIC ACID 1 MG: 1 TABLET ORAL at 09:41

## 2022-09-18 RX ADMIN — MECLIZINE 12.5 MG: 12.5 TABLET ORAL at 06:07

## 2022-09-18 RX ADMIN — APIXABAN 5 MG: 5 TABLET, FILM COATED ORAL at 09:41

## 2022-09-18 RX ADMIN — DOCUSATE SODIUM 100 MG: 100 CAPSULE, LIQUID FILLED ORAL at 09:41

## 2022-09-18 RX ADMIN — BISACODYL 10 MG: 5 TABLET, COATED ORAL at 13:58

## 2022-09-18 RX ADMIN — ATORVASTATIN CALCIUM 80 MG: 40 TABLET, FILM COATED ORAL at 21:08

## 2022-09-18 NOTE — PLAN OF CARE
Goal Outcome Evaluation:  Plan of Care Reviewed With: patient        Progress: improving   No c/o pain or soa.  Amublating with minimal assist of 1 with walker.   Given dulcolax tabs for c/o constipation, colace was increased to bid.   Pt wife informed to bring in pt home med enbrel to administer to the pt.    Have back x-ray  Take ibuprofen for pain

## 2022-09-18 NOTE — SIGNIFICANT NOTE
Follow-up on weakness.  Patient doing better.  Heart rate improved and stable.  RN reports patient wants his methotrexate and Enbrel.  Patient continued to improve.  We will restart methotrexate from Friday, Enbrel as he takes at home patient to provide his own short period discussed with RN

## 2022-09-18 NOTE — PLAN OF CARE
Goal Outcome Evaluation:  Plan of Care Reviewed With: patient        Progress: improving  Outcome Evaluation: Alert and oriented. Ambulating to BR x1 person assist. Denies dizziness with ambulation. Rested with no complaints voiced. Fine crackles heard in LLL with assessment. Continue plan of care. Call light within reach.

## 2022-09-19 LAB
ANION GAP SERPL CALCULATED.3IONS-SCNC: 9.5 MMOL/L (ref 5–15)
BUN SERPL-MCNC: 24 MG/DL (ref 8–23)
BUN/CREAT SERPL: 25.8 (ref 7–25)
CALCIUM SPEC-SCNC: 9.4 MG/DL (ref 8.6–10.5)
CHLORIDE SERPL-SCNC: 100 MMOL/L (ref 98–107)
CO2 SERPL-SCNC: 25.5 MMOL/L (ref 22–29)
CREAT SERPL-MCNC: 0.93 MG/DL (ref 0.76–1.27)
EGFRCR SERPLBLD CKD-EPI 2021: 90 ML/MIN/1.73
GLUCOSE SERPL-MCNC: 85 MG/DL (ref 65–99)
POTASSIUM SERPL-SCNC: 4.8 MMOL/L (ref 3.5–5.2)
SODIUM SERPL-SCNC: 135 MMOL/L (ref 136–145)

## 2022-09-19 PROCEDURE — 25010000002: Performed by: INTERNAL MEDICINE

## 2022-09-19 PROCEDURE — 97110 THERAPEUTIC EXERCISES: CPT

## 2022-09-19 PROCEDURE — 80048 BASIC METABOLIC PNL TOTAL CA: CPT | Performed by: INTERNAL MEDICINE

## 2022-09-19 PROCEDURE — 97530 THERAPEUTIC ACTIVITIES: CPT

## 2022-09-19 PROCEDURE — 97535 SELF CARE MNGMENT TRAINING: CPT

## 2022-09-19 PROCEDURE — 97116 GAIT TRAINING THERAPY: CPT

## 2022-09-19 RX ADMIN — MECLIZINE 12.5 MG: 12.5 TABLET ORAL at 06:01

## 2022-09-19 RX ADMIN — METOPROLOL SUCCINATE 25 MG: 25 TABLET, EXTENDED RELEASE ORAL at 09:40

## 2022-09-19 RX ADMIN — APIXABAN 5 MG: 5 TABLET, FILM COATED ORAL at 21:49

## 2022-09-19 RX ADMIN — ACETAMINOPHEN 650 MG: 325 TABLET ORAL at 21:52

## 2022-09-19 RX ADMIN — ETANERCEPT 50 MG: 50 SOLUTION SUBCUTANEOUS at 12:33

## 2022-09-19 RX ADMIN — DOCUSATE SODIUM 100 MG: 100 CAPSULE, LIQUID FILLED ORAL at 09:40

## 2022-09-19 RX ADMIN — MECLIZINE 12.5 MG: 12.5 TABLET ORAL at 21:58

## 2022-09-19 RX ADMIN — DOCUSATE SODIUM 100 MG: 100 CAPSULE, LIQUID FILLED ORAL at 21:49

## 2022-09-19 RX ADMIN — FOLIC ACID 1 MG: 1 TABLET ORAL at 09:40

## 2022-09-19 RX ADMIN — MECLIZINE 12.5 MG: 12.5 TABLET ORAL at 14:55

## 2022-09-19 RX ADMIN — APIXABAN 5 MG: 5 TABLET, FILM COATED ORAL at 09:40

## 2022-09-19 RX ADMIN — ATORVASTATIN CALCIUM 80 MG: 40 TABLET, FILM COATED ORAL at 21:49

## 2022-09-19 RX ADMIN — PANTOPRAZOLE SODIUM 40 MG: 40 TABLET, DELAYED RELEASE ORAL at 09:40

## 2022-09-19 RX ADMIN — TUBERCULIN PURIFIED PROTEIN DERIVATIVE 5 UNITS: 5 INJECTION, SOLUTION INTRADERMAL at 21:54

## 2022-09-19 NOTE — THERAPY TREATMENT NOTE
SNF - Occupational Therapy Treatment Note  [facility]    Patient Name: Hai Gold  : 1955    MRN: [id number]                              Today's Date: 2022       Admit Date: 2022    Visit Dx:     ICD-10-CM ICD-9-CM   1. Decreased activities of daily living (ADL)  Z78.9 V49.89   2. Dysphagia, oropharyngeal  R13.12 787.22   3. Difficulty walking  R26.2 719.7     Patient Active Problem List   Diagnosis   • Pneumonia   • Iron deficiency anemia   • Anemia   • CHF (congestive heart failure) (HCC)   • Acute systolic heart failure (CMS/HCC)   • Cytokine release syndrome, grade 1   • Cytokine release syndrome, grade 2   • Expressive aphasia   • Dysphagia   • Dysarthria   • Generalized weakness   • Bradycardia   • Dizziness     Past Medical History:   Diagnosis Date   • Arthritis    • CHF (congestive heart failure) (HCC)    • Hypertension      Past Surgical History:   Procedure Laterality Date   • COLONOSCOPY     • COLONOSCOPY N/A 2022    Procedure: COLONOSCOPY WITH POLYPECTOMIES;  Surgeon: Min Campbell MD;  Location: [facility] ENDOSCOPY;  Service: Gastroenterology;  Laterality: N/A;  COLON POLYPS, DIVERTICULOSIS   • ENDOSCOPY N/A 2022    Procedure: ESOPHAGOGASTRODUODENOSCOPY WITH BIOPSIES;  Surgeon: Min Campbell MD;  Location: [facility] ENDOSCOPY;  Service: Gastroenterology;  Laterality: N/A;  HIATAL HERNIA   • HEMORRHOIDECTOMY        General Information     Row Name 22          OT Time and Intention    Document Type therapy note (daily note)  -GC     Mode of Treatment individual therapy;occupational therapy  -GC     Row Name 22          General Information    Patient Profile Reviewed yes  -     Existing Precautions/Restrictions fall  -GC     Row Name 22          Safety Issues, Functional Mobility    Impairments Affecting Function (Mobility) balance;endurance/activity tolerance;strength  -GC           User Key  (r) = Recorded By, (t) = Taken  By, (c) = Cosigned By    Initials Name Provider Type     Sylvie Weber OT Occupational Therapist                 Mobility/ADL's     Row Name 09/19/22 0823          Transfers    Transfers bed-chair transfer;sit-stand transfer;stand-sit transfer;toilet transfer  Tub bench and chair with arms  -     Bed-Chair [place] (Transfers) contact guard;verbal cues;standby assist  -     Assistive Device (Bed-Chair Transfers) walker, front-wheeled  -     Sit-Stand [place] (Transfers) standby assist  -     Stand-Sit [place] (Transfers) standby assist  -     Row Name 09/19/22 0823          Sit-Stand Transfer    Assistive Device (Sit-Stand Transfers) walker, front-wheeled  [facility]     Comment, (Sit-Stand Transfer) X4 during ADLs/shower  -[facility] Name 09/19/22 0823          Stand-Sit Transfer    Assistive Device (Stand-Sit Transfers) walker, front-wheeled  -[facility] Name 09/19/22 0823          Functional Mobility    Functional Mobility- Ind. Level contact guard assist;1 person;supervision required  -     Functional Mobility- Device walker, front-wheeled  [facility]     Functional Mobility- Comment Patient ambulated to/from bathroom, shower in therapy gym with contact-guard/standby assist using a rolling walker  -[facility] Name 09/19/22 0823          Activities of Daily Living    BADL Assessment/Intervention bathing;upper body dressing;lower body dressing;grooming;toileting  -[facility] Name 09/19/22 0823          Bathing Assessment/Intervention    [place] Level (Bathing) standby assist  [facility]     Assistive Devices (Bathing) hand-held shower spray hose;grab bar, tub/shower;tub bench  -[facility] Name 09/19/22 0823          Upper Body Dressing Assessment/Training    [place] Level (Upper Body Dressing) upper body dressing skills;set up  -[facility] Name 09/19/22 0823          Lower Body Dressing Assessment/Training    [place] Level (Lower Body Dressing) lower body dressing skills;standby assist;verbal  cues;contact guard assist  -     Row Name 09/19/22 0823          Grooming Assessment/Training    [place] Level (Grooming) grooming skills;set up  -     Row Name 09/19/22 0823          Toileting Assessment/Training    [place] Level (Toileting) toileting skills;standby assist  -           User Key  (r) = Recorded By, (t) = Taken By, (c) = Cosigned By    Initials Name Provider Type    Sylvie Gonzalez OT Occupational Therapist               Obj/Interventions     Row Name 09/19/22 0825          Motor Skills    Therapeutic Exercise aerobic  Patient participated in therapeutic exercise to facilitate endurance to support ADLs.  Therapeutic exercise included Omni cycle x15 minutes with no rest breaks.  -           User Key  (r) = Recorded By, (t) = Taken By, (c) = Cosigned By    Initials Name Provider Type    Sylvie Gonzalez OT Occupational Therapist               Goals/Plan    No documentation.                Clinical Impression     [facility] Name 09/19/22 0825          Pain Scale: FACES Pre/Post-Treatment    Pain: FACES Scale, Pretreatment 0-->no hurt  -     Posttreatment Pain Rating 0-->no hurt  -[facility] Name 09/19/22 0825          Plan of Care Review    Plan of Care Reviewed With patient  -     Progress improving  -     Outcome Evaluation Patient has progressed to a contact-guard/standby assist with basic ADLs using a rolling walker with improved balance, endurance, safety and general strength.  Plan to continue POC established for long-term goals to return home at a modified independent level.  Possible discharge this week when maximum potential is achieved.  -     Row Name 09/19/22 0825          Therapy Plan Review/Discharge Plan (OT)    Anticipated Discharge Disposition (OT) home with home health  -           User Key  (r) = Recorded By, (t) = Taken By, (c) = Cosigned By    Initials Name Provider Type    Sylvie Gonzalez OT Occupational Therapist               Outcome Measures     Row Name  09/19/22 0828          How much help from another is currently needed...    Putting on and taking off regular lower body clothing? 3  -GC     Bathing (including washing, rinsing, and drying) 3  -GC     Toileting (which includes using toilet bed pan or urinal) 3  -GC     Putting on and taking off regular upper body clothing 4  -GC     Taking care of personal grooming (such as brushing teeth) 4  -GC     Eating meals 4  -GC     AM-PAC 6 Clicks Score (OT) 21  -GC     Row Name 09/18/22 2107          How much help from another person do you currently need...    Turning from your back to your side while in flat bed without using bedrails? 4  -TM     Moving from lying on back to sitting on the side of a flat bed without bedrails? 4  -TM     Moving to and from a bed to a chair (including a wheelchair)? 3  -TM     Standing up from a chair using your arms (e.g., wheelchair, bedside chair)? 4  -TM     Climbing 3-5 steps with a railing? 3  -TM     To walk in hospital room? 3  -TM     AM-PAC 6 Clicks Score (PT) 21  -TM     Highest level of mobility 6 --> Walked 10 steps or more  -     Row Name 09/19/22 0828          Functional Assessment    Outcome Measure Options AM-PAC 6 Clicks Daily Activity (OT);Optimal Instrument  -     Row Name 09/19/22 0828          Optimal Instrument    Optimal Instrument Optimal - 3  -GC     Bending/Stooping 2  -GC     Standing 2  -GC     Reaching 1  -GC           User Key  (r) = Recorded By, (t) = Taken By, (c) = Cosigned By    Initials Name Provider Type    TM Devora Crow, RN Registered Nurse    Sylvie Gonzalez OT Occupational Therapist              Section G              Section GG                         Occupational Therapy Education                 Title: PT OT SLP Therapies (In Progress)     Topic: Occupational Therapy (In Progress)     Point: ADL training (Done)     Description:   Instruct learner(s) on proper safety adaptation and remediation techniques during self care or transfers.    Instruct in proper use of assistive devices.              Learning Progress Summary           Patient Acceptance, E, VU by AV at 9/15/2022 1114    Comment: Need for staff assistance for all standing ADLs/transfers  Safe transfer techniques  Safe positioning during ADLs    Acceptance, E, VU by AV at 9/14/2022 1110    Comment: Need for staff assistance for all standing ADLs/transfers  Safe transfer techniques  Safe positioning ADLs    Acceptance, E, VU by AV at 9/13/2022 1050    Comment: Need for staff assistance for all standing ADLs/transfers  Safe positioning ADLs  Safe transfer techniques                   Point: Home exercise program (Not Started)     Description:   Instruct learner(s) on appropriate technique for monitoring, assisting and/or progressing therapeutic exercises/activities.              Learner Progress:  Not documented in this visit.          Point: Precautions (Done)     Description:   Instruct learner(s) on prescribed precautions during self-care and functional transfers.              Learning Progress Summary           Patient Acceptance, E, VU by AV at 9/14/2022 1110    Comment: Need for staff assistance for all standing ADLs/transfers  Safe transfer techniques  Safe positioning ADLs    Acceptance, E, VU by AV at 9/13/2022 1050    Comment: Need for staff assistance for all standing ADLs/transfers  Safe positioning ADLs  Safe transfer techniques                   Point: Body mechanics (Not Started)     Description:   Instruct learner(s) on proper positioning and spine alignment during self-care, functional mobility activities and/or exercises.              Learner Progress:  Not documented in this visit.                      User Key     Initials Effective Dates Name Provider Type Discipline     06/16/21 -  Tray Marcelo OT Occupational Therapist OT              OT Recommendation and Plan     Plan of Care Review  Plan of Care Reviewed With: patient  Progress: improving  Outcome Evaluation:  Patient has progressed to a contact-guard/standby assist with basic ADLs using a rolling walker with improved balance, endurance, safety and general strength.  Plan to continue POC established for long-term goals to return home at a modified independent level.  Possible discharge this week when maximum potential is achieved.     Time Calculation:    Time Calculation- OT     Row Name 09/19/22 0829             Time Calculation- OT    OT Received On 09/19/22  -GC              Timed Charges    66776 - OT Therapeutic Exercise Minutes 15  -GC      48373 - OT Therapeutic Activity Minutes 10  -GC      32057 - OT Self Care/Mgmt Minutes 23  -GC              SNF Occupational Therapy Minutes    Skilled Minutes- OT 48 min  -GC              Total Minutes    Timed Charges Total Minutes 48  -GC       Total Minutes 48  -GC            User Key  (r) = Recorded By, (t) = Taken By, (c) = Cosigned By    Initials Name Provider Type     Sylvie Weber OT Occupational Therapist              Therapy Charges for Today     Code Description Service Date Service Provider Modifiers Qty    85375417615 HC OT THER PROC EA 15 MIN 9/19/2022 Sylvie Weber OT GO 1    08905071229 HC OT THERAPEUTIC ACT EA 15 MIN 9/19/2022 Sylvie Weber OT GO 1    51595201123 HC OT SELF CARE/MGMT/TRAIN EA 15 MIN 9/19/2022 Sylvie Weber OT GO 1               Sylvie Weber OT  9/19/2022

## 2022-09-19 NOTE — PLAN OF CARE
Goal Outcome Evaluation:   Alert and oriented and pleasant with staff. X1 assist for transfers and ambulation. No c/o pain noted this shift, initial early c/o constipation, resolved after non pharmacologic intervention. No c/o dizziness or nausea noted. Sitting up in bed, Family at bedside, call light in reach.

## 2022-09-19 NOTE — PLAN OF CARE
Goal Outcome Evaluation:  Plan of Care Reviewed With: patient        Progress: improving  Outcome Evaluation: Alert and oriented. Ambulating to BR x1 person assist. No complaints of pain or dizziness. VSS. Continue plan of care. Call light within reach.

## 2022-09-19 NOTE — THERAPY TREATMENT NOTE
SNF - Physical Therapy Treatment Note  [facility]     Patient Name: Hai Gold  : 1955  MRN: [id number]  Today's Date: 2022      Visit Dx:     ICD-10-CM ICD-9-CM   1. Decreased activities of daily living (ADL)  Z78.9 V49.89   2. Dysphagia, oropharyngeal  R13.12 787.22   3. Difficulty walking  R26.2 719.7     Patient Active Problem List   Diagnosis   • Pneumonia   • Iron deficiency anemia   • Anemia   • CHF (congestive heart failure) (HCC)   • Acute systolic heart failure (CMS/HCC)   • Cytokine release syndrome, grade 1   • Cytokine release syndrome, grade 2   • Expressive aphasia   • Dysphagia   • Dysarthria   • Generalized weakness   • Bradycardia   • Dizziness     Past Medical History:   Diagnosis Date   • Arthritis    • CHF (congestive heart failure) (HCC)    • Hypertension      Past Surgical History:   Procedure Laterality Date   • COLONOSCOPY     • COLONOSCOPY N/A 2022    Procedure: COLONOSCOPY WITH POLYPECTOMIES;  Surgeon: Min Campbell MD;  Location: [facility] ENDOSCOPY;  Service: Gastroenterology;  Laterality: N/A;  COLON POLYPS, DIVERTICULOSIS   • ENDOSCOPY N/A 2022    Procedure: ESOPHAGOGASTRODUODENOSCOPY WITH BIOPSIES;  Surgeon: Min Campbell MD;  Location: [facility] ENDOSCOPY;  Service: Gastroenterology;  Laterality: N/A;  HIATAL HERNIA   • HEMORRHOIDECTOMY       PT Assessment (last 12 hours)     PT Evaluation and Treatment     Row Name 22 1435          Physical Therapy Time and Intention    Subjective Information no complaints  -WM     Document Type therapy note (daily note)  -WM     Mode of Treatment individual therapy;physical therapy  -WM     Patient Effort good  -WM     Symptoms Noted During/After Treatment fatigue  -WM     Row Name 22 1435          Pain Scale: FACES Pre/Post-Treatment    Pain: FACES Scale, Pretreatment 0-->no hurt  -WM     Posttreatment Pain Rating 0-->no hurt  -WM     Row Name 22 1438          Cognition    Affect/Mental  Status (Cognition) WFL  -     Row Name 09/19/22 1435          Transfers    Sit-Stand [place] (Transfers) standby assist  -     Stand-Sit [place] (Transfers) standby assist  -     Row Name 09/19/22 1435          Sit-Stand Transfer    Assistive Device (Sit-Stand Transfers) --  No AD  -WM     Row Name 09/19/22 1435          Stand-Sit Transfer    Assistive Device (Stand-Sit Transfers) --  No AD  -[facility] Name 09/19/22 1435          Gait/Stairs (Locomotion)    [place] Level (Gait) standby assist  -     Assistive Device (Gait) --  No AD  -WM     Distance in Feet (Gait) 150 + 70  -     Deviations/Abnormal Patterns (Gait) stride length decreased  -     Row Name 09/19/22 1435          Safety Issues, Functional Mobility    Impairments Affecting Function (Mobility) balance;endurance/activity tolerance;strength  -[facility] Name 09/19/22 1435          Hip (Therapeutic Exercise)    Hip Isometrics (Therapeutic Exercise) bilateral;gluteal sets;supine;10 repetitions;5 repetitions;3 second hold;2 sets  -     Hip Strengthening (Therapeutic Exercise) bilateral;aBduction;aDduction;heel slides;sitting;supine;resistance band;green;15 repititions;2 sets  Manual resistance  -[facility] Name 09/19/22 1435          Knee (Therapeutic Exercise)    Knee Isometrics (Therapeutic Exercise) bilateral;quad sets;supine;10 repetitions;5 repetitions;3 second hold;2 sets  -     Knee Strengthening (Therapeutic Exercise) bilateral;SAQ (short arc quad);LAQ (long arc quad);hamstring curls;sitting;supine;2 lb free weight;resistance band;green;15 repititions;2 sets  -[facility] Name 09/19/22 1435          Ankle (Therapeutic Exercise)    Ankle AROM (Therapeutic Exercise) bilateral;dorsiflexion;plantarflexion;supine;15 repititions;2 sets  -[facility] Name 09/19/22 1435          Aerobic Exercise    Time Performed (Aerobic Exercise) Nustep x 15 minutes, level 2  -[facility] Name 09/19/22 1435          Progress Summary (PT)     Progress Toward Functional Goals (PT) progress toward functional goals is good  -           User Key  (r) = Recorded By, (t) = Taken By, (c) = Cosigned By    Initials Name Provider Type     Florentino Baum PTA Physical Therapist Assistant                Physical Therapy Education                 Title: PT OT SLP Therapies (In Progress)     Topic: Physical Therapy (In Progress)     Point: Mobility training (In Progress)     Learning Progress Summary           Patient Acceptance, E, NR by  at 9/13/2022 1535                   Point: Home exercise program (Not Started)     Learner Progress:  Not documented in this visit.          Point: Body mechanics (Not Started)     Learner Progress:  Not documented in this visit.          Point: Precautions (In Progress)     Learning Progress Summary           Patient Acceptance, E, NR by  at 9/13/2022 1535                               User Key     Initials Effective Dates Name Provider Type Discipline     04/25/21 -  Gissell Chaudhary, MARIAH Physical Therapist PT              PT Recommendation and Plan     Progress Summary (PT)  Progress Toward Functional Goals (PT): progress toward functional goals is good   Outcome Measures     Row Name 09/19/22 1440 09/17/22 1000          How much help from another person do you currently need...    Turning from your back to your side while in flat bed without using bedrails? 4  -WM 3  -CS     Moving from lying on back to sitting on the side of a flat bed without bedrails? 4  -WM 3  -CS     Moving to and from a bed to a chair (including a wheelchair)? 3  -WM 3  -CS     Standing up from a chair using your arms (e.g., wheelchair, bedside chair)? 4  -WM 4  -CS     Climbing 3-5 steps with a railing? 3  -WM 3  -CS     To walk in hospital room? 3  -WM 3  -CS     AM-PAC 6 Clicks Score (PT) 21  -WM 19  -CS            Functional Assessment    Outcome Measure Options -- AM-PAC 6 Clicks Basic Mobility (PT)  -           User Key  (r) = Recorded By,  (t) = Taken By, (c) = Cosigned By    Initials Name Provider Type    Florentino Yanes PTA Physical Therapist Assistant    Jordin Moore PTA Physical Therapist Assistant                 Time Calculation:    PT Charges     Row Name 09/19/22 1435             Time Calculation    PT Received On 09/19/22  -WM              Timed Charges    12609 - PT Therapeutic Exercise Minutes 29  -WM      28692 - Gait Training Minutes  9  -WM      70909 - PT Therapeutic Activity Minutes 3  -WM              SNF Physical Therapy Minutes    Skilled Minutes- PT 41 min  -WM              Total Minutes    Timed Charges Total Minutes 41  -WM       Total Minutes 41  -WM            User Key  (r) = Recorded By, (t) = Taken By, (c) = Cosigned By    Initials Name Provider Type     Florentino Baum PTA Physical Therapist Assistant              Therapy Charges for Today     Code Description Service Date Service Provider Modifiers Qty    93987017806 HC PT THER PROC EA 15 MIN 9/19/2022 Florentino Baum PTA GP 2    14450209987 HC GAIT TRAINING EA 15 MIN 9/19/2022 Florentino Baum PTA GP 1          PT G-Codes  Outcome Measure Options: AM-PAC 6 Clicks Daily Activity (OT), Optimal Instrument  AM-PAC 6 Clicks Score (PT): 21  AM-PAC 6 Clicks Score (OT): 21    Florentino Baum PTA  9/19/2022

## 2022-09-20 PROCEDURE — 97535 SELF CARE MNGMENT TRAINING: CPT

## 2022-09-20 PROCEDURE — 97110 THERAPEUTIC EXERCISES: CPT

## 2022-09-20 PROCEDURE — 97116 GAIT TRAINING THERAPY: CPT

## 2022-09-20 PROCEDURE — 92526 ORAL FUNCTION THERAPY: CPT

## 2022-09-20 RX ADMIN — APIXABAN 5 MG: 5 TABLET, FILM COATED ORAL at 21:19

## 2022-09-20 RX ADMIN — PANTOPRAZOLE SODIUM 40 MG: 40 TABLET, DELAYED RELEASE ORAL at 09:56

## 2022-09-20 RX ADMIN — METOPROLOL SUCCINATE 25 MG: 25 TABLET, EXTENDED RELEASE ORAL at 09:56

## 2022-09-20 RX ADMIN — DOCUSATE SODIUM 100 MG: 100 CAPSULE, LIQUID FILLED ORAL at 21:20

## 2022-09-20 RX ADMIN — MECLIZINE 12.5 MG: 12.5 TABLET ORAL at 14:44

## 2022-09-20 RX ADMIN — ATORVASTATIN CALCIUM 80 MG: 40 TABLET, FILM COATED ORAL at 21:19

## 2022-09-20 RX ADMIN — DOCUSATE SODIUM 100 MG: 100 CAPSULE, LIQUID FILLED ORAL at 09:56

## 2022-09-20 RX ADMIN — MECLIZINE 12.5 MG: 12.5 TABLET ORAL at 05:53

## 2022-09-20 RX ADMIN — FOLIC ACID 1 MG: 1 TABLET ORAL at 09:56

## 2022-09-20 RX ADMIN — APIXABAN 5 MG: 5 TABLET, FILM COATED ORAL at 09:56

## 2022-09-20 RX ADMIN — MECLIZINE 12.5 MG: 12.5 TABLET ORAL at 21:19

## 2022-09-20 NOTE — CONSULTS
"Nutrition Services    Patient Name: Hai Gold  YOB: 1955  MRN: [id number]  Admission date: 9/12/2022      CLINICAL NUTRITION ASSESSMENT      Reason for Assessment  Follow-up protocol     H&P:    Past Medical History:   Diagnosis Date   • Arthritis    • CHF (congestive heart failure) (HCC)    • Hypertension         Current Problems:   Active Hospital Problems    Diagnosis    • Dizziness    • Bradycardia    • Generalized weakness    • Dysphagia         Nutrition/Diet History         Narrative     RD following per protocol. Resident is consuming ~75% of meals. Per SLP note, resident requests Boost Breeze. RD to modify supplement order to reflect this. Will continue to monitor.      Anthropometrics        Current Height, Weight Height: 170.2 cm (67.01\")  Weight: 52.3 kg (115 lb 4.8 oz)   Current BMI Body mass index is 18.05 kg/m².       Weight Hx  Wt Readings from Last 30 Encounters:   09/20/22 0558 52.3 kg (115 lb 4.8 oz)   09/19/22 0500 52.5 kg (115 lb 11.9 oz)   09/18/22 0137 52.8 kg (116 lb 6.5 oz)   09/17/22 0500 53.5 kg (117 lb 15.1 oz)   09/16/22 0300 53.2 kg (117 lb 4.6 oz)   09/14/22 0225 53 kg (116 lb 13.5 oz)   09/13/22 1022 53 kg (116 lb 13.5 oz)   09/13/22 0500 53 kg (116 lb 13.5 oz)   09/12/22 1702 53.6 kg (118 lb 2.7 oz)   09/10/22 1657 60.9 kg (134 lb 4.2 oz)   09/10/22 0746 56.2 kg (123 lb 14.4 oz)   09/07/22 1621 55.7 kg (122 lb 12.7 oz)   08/14/22 0500 61 kg (134 lb 7.7 oz)   08/13/22 0500 61 kg (134 lb 7.7 oz)   08/11/22 0935 62.6 kg (138 lb 0.1 oz)   08/11/22 0434 61.9 kg (136 lb 7.4 oz)   06/27/22 0827 58.2 kg (128 lb 4.9 oz)   05/30/22 1120 56.2 kg (123 lb 14.4 oz)   04/11/22 1050 55.6 kg (122 lb 9.2 oz)   03/26/22 2103 57.1 kg (125 lb 14.1 oz)   03/26/22 1222 57.1 kg (125 lb 14.1 oz)   11/21/19 0000 60.8 kg (134 lb)   09/06/19 0000 54.2 kg (119 lb 8 oz)   08/15/19 0000 55.5 kg (122 lb 4 oz)   03/14/19 0000 59 kg (130 lb)            Wt Change Observation -12.7% x 1 mo "     Estimated/Assessed Needs       Energy Requirements 25-30 kcal/kg IBW   EST Needs (kcal/day) 1650-1980       Protein Requirements 1.0-1.5 g/kg   EST Daily Needs (g/day) 66-99       Fluid Requirements 1 ml/kcal    Estimated Needs (mL/day) 1650-1980     Labs/Medications         Pertinent Labs Reviewed.   Results from last 7 days   Lab Units 09/19/22  0440 09/18/22  1126 09/14/22  0909   SODIUM mmol/L 135* 134* 133*   POTASSIUM mmol/L 4.8 5.7* 4.1   CHLORIDE mmol/L 100 97* 94*   CO2 mmol/L 25.5 25.2 26.1   BUN mg/dL 24* 23 38*   CREATININE mg/dL 0.93 1.12 1.34*   CALCIUM mg/dL 9.4 9.6 9.5   BILIRUBIN mg/dL  --  0.3 0.3   ALK PHOS U/L  --  98 107   ALT (SGPT) U/L  --  28 15   AST (SGOT) U/L  --  28 20   GLUCOSE mg/dL 85 80 170*     Results from last 7 days   Lab Units 09/18/22  1126   HEMOGLOBIN g/dL 12.9*   HEMATOCRIT % 38.4       Lab Results   Component Value Date    HGBA1C 5.40 09/11/2022         Pertinent Medications Reviewed.     Current Nutrition Orders & Evaluation of Intake       Oral Nutrition     Current PO Diet Diet Soft Texture; Ground; Thin; Cardiac   Supplement Orders Placed This Encounter      Dietary Nutrition Supplements Boost Breeze (Ensure Clear)       Malnutrition Severity Assessment      Patient meets criteria for : Severe Malnutrition         Nutrition Diagnosis         Nutrition Dx Problem 1 Severe malnutrition related to decreased ability to consume sufficient energy as evidenced by unintended wt change., body composition changes. and variable po intake        Nutrition Intervention         Boost Breeze TID  +750 kcal, 27 g PRO/day     Medical Nutrition Therapy/Nutrition Education          Learner     Readiness Patient  N/A     Method     Response N/A  N/A     Monitor/Evaluation        Monitor Per protocol, PO intake, Supplement intake, Weight       Nutrition Discharge Plan         Continue dietary supplementation daily        Electronically signed by:  Frida Crow RD  09/20/22 10:38 EDT

## 2022-09-20 NOTE — THERAPY DISCHARGE NOTE
SNF - Speech Language Pathology Discharge Summary/Discharge   [facility]     Patient Name: Hai Gold  : 1955  MRN: [id number]  Today's Date: 2022               Admit Date: 2022     Visit Dx:    ICD-10-CM ICD-9-CM   1. Decreased activities of daily living (ADL)  Z78.9 V49.89   2. Dysphagia, oropharyngeal  R13.12 787.22   3. Difficulty walking  R26.2 719.7     Patient Active Problem List   Diagnosis   • Pneumonia   • Iron deficiency anemia   • Anemia   • CHF (congestive heart failure) (HCC)   • Acute systolic heart failure (CMS/HCC)   • Cytokine release syndrome, grade 1   • Cytokine release syndrome, grade 2   • Expressive aphasia   • Dysphagia   • Dysarthria   • Generalized weakness   • Bradycardia   • Dizziness     Past Medical History:   Diagnosis Date   • Arthritis    • CHF (congestive heart failure) (HCC)    • Hypertension      Past Surgical History:   Procedure Laterality Date   • COLONOSCOPY     • COLONOSCOPY N/A 2022    Procedure: COLONOSCOPY WITH POLYPECTOMIES;  Surgeon: Min Campbell MD;  Location: [facility] ENDOSCOPY;  Service: Gastroenterology;  Laterality: N/A;  COLON POLYPS, DIVERTICULOSIS   • ENDOSCOPY N/A 2022    Procedure: ESOPHAGOGASTRODUODENOSCOPY WITH BIOPSIES;  Surgeon: Min Campbell MD;  Location: [facility] ENDOSCOPY;  Service: Gastroenterology;  Laterality: N/A;  HIATAL HERNIA   • HEMORRHOIDECTOMY     SPEECH PATHOLOGY DISCHARGE SUMMARY      DIAGNOSIS: Oropharyngeal dysphagia    SUBJECTIVE: Patient 67-year-old male with history of dysphagia evaluated by speech therapy    PAIN: None noted    OBJECTIVE DATA/ CARE AND TREATMENT RECEIVED: Evaluation completed and recommended mechanical soft diet ground meats with extra sauce/gravy and single sips of thin liquids patient seen for education regarding diet recommendations and safe swallow strategies and educated on compensatory swallow strategies.  Monitor for clinical signs of aspiration and diet  tolerance.    PATIENT STATUS AT DISCHARGE: Mechanical soft diet- thin liquids    INSTRUCTIONS GIVEN TO PATIENT AND FAMILY: Add sauce/gravy to dry foods, 90 degrees upright for all intake, remain upright for 45 minutes after intake, alternate solids and liquids, double swallow    FUNCTIONAL ASSESSMENT INSTRUMENT: Patient currently scored a level 6 of 7 on Functional Communication Measures for swallowing indicating a 1-19% limitation in function on a projected goal of 1-19% limitation.    ASSESSMENT: Mild oropharyngeal dysphagia    REASON FOR DISCHARGE: Goals met    DISCHARGE PLAN/ RECOMMENDATIONS: Mechanical soft diet-single sips of thin liquids, add sauce/gravy to dry foods, alternate solids and liquids remain upright, double swallow    DISCHARGE DATE: 9/20/2022    TOTAL VISITS: 4       EDUCATION  The patient has been educated in the following areas:   Dysphagia (Swallowing Impairment).                Irma Gardiner, SLP  9/20/2022

## 2022-09-20 NOTE — THERAPY TREATMENT NOTE
SNF - Occupational Therapy Treatment Note  [facility]    Patient Name: Hai Gold  : 1955    MRN: [id number]                              Today's Date: 2022       Admit Date: 2022    Visit Dx:     ICD-10-CM ICD-9-CM   1. Decreased activities of daily living (ADL)  Z78.9 V49.89   2. Dysphagia, oropharyngeal  R13.12 787.22   3. Difficulty walking  R26.2 719.7     Patient Active Problem List   Diagnosis   • Pneumonia   • Iron deficiency anemia   • Anemia   • CHF (congestive heart failure) (HCC)   • Acute systolic heart failure (CMS/HCC)   • Cytokine release syndrome, grade 1   • Cytokine release syndrome, grade 2   • Expressive aphasia   • Dysphagia   • Dysarthria   • Generalized weakness   • Bradycardia   • Dizziness     Past Medical History:   Diagnosis Date   • Arthritis    • CHF (congestive heart failure) (HCC)    • Hypertension      Past Surgical History:   Procedure Laterality Date   • COLONOSCOPY     • COLONOSCOPY N/A 2022    Procedure: COLONOSCOPY WITH POLYPECTOMIES;  Surgeon: Min Campbell MD;  Location: [facility] ENDOSCOPY;  Service: Gastroenterology;  Laterality: N/A;  COLON POLYPS, DIVERTICULOSIS   • ENDOSCOPY N/A 2022    Procedure: ESOPHAGOGASTRODUODENOSCOPY WITH BIOPSIES;  Surgeon: Min Campbell MD;  Location: [facility] ENDOSCOPY;  Service: Gastroenterology;  Laterality: N/A;  HIATAL HERNIA   • HEMORRHOIDECTOMY        General Information     Row Name 22 1258          OT Time and Intention    Document Type therapy note (daily note)  -AV     Mode of Treatment individual therapy;occupational therapy  -AV     Row Name 22 1258          General Information    Existing Precautions/Restrictions fall  -AV     Barriers to Rehab none identified  -AV     Row Name 22 1252          Cognition    Orientation Status (Cognition) --  Alert, pleasant and cooperative.  Receptive to cues for safe positioning and transfers during functional ADL session  -AV     Row  Name 09/20/22 1258          Safety Issues, Functional Mobility    Impairments Affecting Function (Mobility) balance;endurance/activity tolerance;strength  -AV           User Key  (r) = Recorded By, (t) = Taken By, (c) = Cosigned By    Initials Name Provider Type    Tray Parker OT Occupational Therapist                 Mobility/ADL's     Row Name 09/20/22 1258          Transfers    Transfers bed-chair transfer;sit-stand transfer  -AV     Bed-Chair Weston (Transfers) standby assist;verbal cues  -AV     Assistive Device (Bed-Chair Transfers) walker, front-wheeled  -AV     Sit-Stand Weston (Transfers) standby assist;verbal cues  -AV     Row Name 09/20/22 1258          Sit-Stand Transfer    Assistive Device (Sit-Stand Transfers) walker, front-wheeled  -AV     Comment, (Sit-Stand Transfer) X4 during functional ADL session  -AV     Row Name 09/20/22 1258          Functional Mobility    Functional Mobility- Ind. Level standby assist  -AV     Functional Mobility- Device walker, front-wheeled  -AV     Functional Mobility- Comment Patient ambulated to/from sink for grooming with RW/SBA.  -AV     Row Name 09/20/22 1258          Activities of Daily Living    BADL Assessment/Intervention --  (I) UB bathing/dressing with set up while seated.  SBA LB bathing/dressing with set up and cues for safe positioning.  Groomed at sink SBA/RW.  Pullover clothing and slipper socks worn at patient request.  -AV           User Key  (r) = Recorded By, (t) = Taken By, (c) = Cosigned By    Initials Name Provider Type    Tray Parker OT Occupational Therapist               Obj/Interventions     Row Name 09/20/22 1300          Balance    Dynamic Sitting Balance independent  -AV     Dynamic Standing Balance standby assist  -AV     Position/Device Used, Standing Balance walker, front-wheeled  -AV     Balance Interventions sitting;standing;sit to stand;supported;minimal challenge;occupation based/functional task  -AV      Comment, Balance Sit to stand x4 during functional ADL session  -AV           User Key  (r) = Recorded By, (t) = Taken By, (c) = Cosigned By    Initials Name Provider Type    Tray Parker OT Occupational Therapist               Goals/Plan    No documentation.                Clinical Impression     Row Name 09/20/22 1301          Pain Scale: FACES Pre/Post-Treatment    Pain: FACES Scale, Pretreatment 0-->no hurt  -AV     Posttreatment Pain Rating 0-->no hurt  -AV     Row Name 09/20/22 1301          Plan of Care Review    Progress improving  -AV     Outcome Evaluation Patient progressing and now able to perform bathing, dressing, grooming and transfers SBA with RW, set up and cues.  Continued OT is needed to remediate/compensate for deficits to maximize independence.  -AV     Row Name 09/20/22 1301          Vital Signs    O2 Delivery Pre Treatment room air  -AV     O2 Delivery Intra Treatment room air  -AV     O2 Delivery Post Treatment room air  -AV           User Key  (r) = Recorded By, (t) = Taken By, (c) = Cosigned By    Initials Name Provider Type    Tray Parker OT Occupational Therapist               Outcome Measures     Row Name 09/20/22 1302          How much help from another is currently needed...    Putting on and taking off regular lower body clothing? 3  -AV     Bathing (including washing, rinsing, and drying) 3  -AV     Toileting (which includes using toilet bed pan or urinal) 3  -AV     Putting on and taking off regular upper body clothing 4  -AV     Taking care of personal grooming (such as brushing teeth) 4  -AV     Eating meals 4  -AV     AM-PAC 6 Clicks Score (OT) 21  -AV     Row Name 09/20/22 1044          How much help from another person do you currently need...    Turning from your back to your side while in flat bed without using bedrails? 4  -WM     Moving from lying on back to sitting on the side of a flat bed without bedrails? 4  -WM     Moving to and from a bed to a chair  (including a wheelchair)? 3  -WM     Standing up from a chair using your arms (e.g., wheelchair, bedside chair)? 4  -WM     Climbing 3-5 steps with a railing? 3  -WM     To walk in hospital room? 3  -WM     AM-PAC 6 Clicks Score (PT) 21  -WM     Highest level of mobility 6 --> Walked 10 steps or more  -WM     Row Name 09/20/22 1302          Optimal Instrument    Bending/Stooping 1  -AV     Standing 2  -AV     Reaching 1  -AV           User Key  (r) = Recorded By, (t) = Taken By, (c) = Cosigned By    Initials Name Provider Type    WM Florentino Baum PTA Physical Therapist Assistant    AV Tray Marcelo OT Occupational Therapist              Section G  Mobility  Dressing - self performance: limited assistance (staff provide guided maneuvering of limbs or other non-weight bearing assistance)  Dressing support/assistance: One person assist  Eating - self performance: independent  Eating support/assistance: No setup or physical help  Toileting - self performance: limited assistance (staff provide guided maneuvering of limbs or other non-weight bearing assistance)  Toileting support/assistance: One person assist  Personal hygiene - self performance: limited assistance (staff provide guided maneuvering of limbs or other non-weight bearing assistance)  Personal hygiene support/assistance: One person assist  Bathing  Bathing - self performance: Physical help with bathing (exclude washing back and hair for patient)  Bathing support/assistance: One person assist     Range of Motion  Upper Extremity: No impairment  Section GG                         Occupational Therapy Education                 Title: PT OT SLP Therapies (In Progress)     Topic: Occupational Therapy (In Progress)     Point: ADL training (Done)     Description:   Instruct learner(s) on proper safety adaptation and remediation techniques during self care or transfers.   Instruct in proper use of assistive devices.              Learning Progress Summary            Patient Acceptance, E, VU by AV at 9/15/2022 1114    Comment: Need for staff assistance for all standing ADLs/transfers  Safe transfer techniques  Safe positioning during ADLs    Acceptance, E, VU by AV at 9/14/2022 1110    Comment: Need for staff assistance for all standing ADLs/transfers  Safe transfer techniques  Safe positioning ADLs    Acceptance, E, VU by AV at 9/13/2022 1050    Comment: Need for staff assistance for all standing ADLs/transfers  Safe positioning ADLs  Safe transfer techniques                   Point: Home exercise program (Not Started)     Description:   Instruct learner(s) on appropriate technique for monitoring, assisting and/or progressing therapeutic exercises/activities.              Learner Progress:  Not documented in this visit.          Point: Precautions (Done)     Description:   Instruct learner(s) on prescribed precautions during self-care and functional transfers.              Learning Progress Summary           Patient Acceptance, E, VU by AV at 9/14/2022 1110    Comment: Need for staff assistance for all standing ADLs/transfers  Safe transfer techniques  Safe positioning ADLs    Acceptance, E, VU by AV at 9/13/2022 1050    Comment: Need for staff assistance for all standing ADLs/transfers  Safe positioning ADLs  Safe transfer techniques                   Point: Body mechanics (Not Started)     Description:   Instruct learner(s) on proper positioning and spine alignment during self-care, functional mobility activities and/or exercises.              Learner Progress:  Not documented in this visit.                      User Key     Initials Effective Dates Name Provider Type Discipline     06/16/21 -  Tray Marcelo OT Occupational Therapist OT              OT Recommendation and Plan  Planned Therapy Interventions (OT): activity tolerance training, BADL retraining, functional balance retraining, occupation/activity based interventions, patient/caregiver education/training,  transfer/mobility retraining, strengthening exercise  Therapy Frequency (OT): 5 times/wk  Plan of Care Review  Plan of Care Reviewed With: patient  Progress: improving  Outcome Evaluation: Patient progressing and now able to perform bathing, dressing, grooming and transfers SBA with RW, set up and cues.  Continued OT is needed to remediate/compensate for deficits to maximize independence.     Time Calculation:    Time Calculation- OT     Row Name 09/20/22 1304 09/20/22 1039          Time Calculation- OT    OT Received On 09/20/22  -AV --     OT Goal Re-Cert Due Date 10/13/22  -AV --            Timed Charges    70428 - Gait Training Minutes  -- 8  -WM     18375 - OT Self Care/Mgmt Minutes 25  -AV --            SNF Occupational Therapy Minutes    Skilled Minutes- OT 25 min  -AV --            Total Minutes    Timed Charges Total Minutes 25  -AV 8  -WM      Total Minutes 25  -AV 8  -WM           User Key  (r) = Recorded By, (t) = Taken By, (c) = Cosigned By    Initials Name Provider Type     Florentino Baum, URSULA Physical Therapist Assistant    AV Tray Marcelo OT Occupational Therapist              Therapy Charges for Today     Code Description Service Date Service Provider Modifiers Qty    66751172583 HC OT SELF CARE/MGMT/TRAIN EA 15 MIN 9/20/2022 Tray Marcelo OT GO 2               Tray Marcelo OT  9/20/2022

## 2022-09-20 NOTE — THERAPY TREATMENT NOTE
SNF - Physical Therapy Treatment Note  [facility]     Patient Name: Hai Gold  : 1955  MRN: [id number]  Today's Date: 2022      Visit Dx:     ICD-10-CM ICD-9-CM   1. Decreased activities of daily living (ADL)  Z78.9 V49.89   2. Dysphagia, oropharyngeal  R13.12 787.22   3. Difficulty walking  R26.2 719.7     Patient Active Problem List   Diagnosis   • Pneumonia   • Iron deficiency anemia   • Anemia   • CHF (congestive heart failure) (HCC)   • Acute systolic heart failure (CMS/HCC)   • Cytokine release syndrome, grade 1   • Cytokine release syndrome, grade 2   • Expressive aphasia   • Dysphagia   • Dysarthria   • Generalized weakness   • Bradycardia   • Dizziness     Past Medical History:   Diagnosis Date   • Arthritis    • CHF (congestive heart failure) (HCC)    • Hypertension      Past Surgical History:   Procedure Laterality Date   • COLONOSCOPY     • COLONOSCOPY N/A 2022    Procedure: COLONOSCOPY WITH POLYPECTOMIES;  Surgeon: Min Campbell MD;  Location: [facility] ENDOSCOPY;  Service: Gastroenterology;  Laterality: N/A;  COLON POLYPS, DIVERTICULOSIS   • ENDOSCOPY N/A 2022    Procedure: ESOPHAGOGASTRODUODENOSCOPY WITH BIOPSIES;  Surgeon: Min Campbell MD;  Location: [facility] ENDOSCOPY;  Service: Gastroenterology;  Laterality: N/A;  HIATAL HERNIA   • HEMORRHOIDECTOMY       PT Assessment (last 12 hours)     PT Evaluation and Treatment     Row Name 22 1040          Physical Therapy Time and Intention    Subjective Information no complaints  -WM     Document Type therapy note (daily note)  -WM     Mode of Treatment individual therapy;physical therapy  -WM     Patient Effort good  -WM     Symptoms Noted During/After Treatment fatigue  -WM     Row Name 22 1040          Pain Scale: FACES Pre/Post-Treatment    Pain: FACES Scale, Pretreatment 0-->no hurt  -WM     Posttreatment Pain Rating 0-->no hurt  -WM     Row Name 22 1040          Cognition    Affect/Mental  Status (Cognition) WNL  -     Row Name 09/20/22 1040          Transfers    Sit-Stand Mathews (Transfers) standby assist  -     Stand-Sit Mathews (Transfers) standby assist  -[facility] Name 09/20/22 1040          Sit-Stand Transfer    Assistive Device (Sit-Stand Transfers) --  No AD  -WM     Row Name 09/20/22 1040          Stand-Sit Transfer    Assistive Device (Stand-Sit Transfers) --  No AD  -[facility] Name 09/20/22 1040          Gait/Stairs (Locomotion)    Mathews Level (Gait) standby assist  -     Assistive Device (Gait) --  No AD  -WM     Distance in Feet (Gait) 200 + 70  -WM     Deviations/Abnormal Patterns (Gait) stride length decreased  -     Row Name 09/20/22 1040          Safety Issues, Functional Mobility    Impairments Affecting Function (Mobility) balance;endurance/activity tolerance;strength  -[facility] Name 09/20/22 1040          Hip (Therapeutic Exercise)    Hip Strengthening (Therapeutic Exercise) bilateral;aBduction;aDduction;marching while seated;sitting;2 lb free weight;resistance band;green;15 repititions;2 sets  Forward/backward walking, side stepping, weight shifting in parallel bars  -[facility] Name 09/20/22 1040          Knee (Therapeutic Exercise)    Knee Strengthening (Therapeutic Exercise) bilateral;LAQ (long arc quad);hamstring curls;sitting;2 lb free weight;resistance band;green;15 repititions;2 sets  -[facility] Name 09/20/22 1040          Ankle (Therapeutic Exercise)    Ankle (Therapeutic Exercise) strengthening exercise  -     Ankle Strengthening (Therapeutic Exercise) bilateral;standing;15 repititions;2 sets  Heel raises in parallel bars  -[facility] Name 09/20/22 1040          Aerobic Exercise    Time Performed (Aerobic Exercise) Nustep x 15 minutes, level 2  -     Row Name 09/20/22 1040          Progress Summary (PT)    Progress Toward Functional Goals (PT) progress toward functional goals is good  -           User Key  (r) = Recorded By, (t) =  Taken By, (c) = Cosigned By    Initials Name Provider Type    Florentino Yanes PTA Physical Therapist Assistant                Physical Therapy Education                 Title: PT OT SLP Therapies (In Progress)     Topic: Physical Therapy (In Progress)     Point: Mobility training (In Progress)     Learning Progress Summary           Patient Acceptance, E, NR by  at 9/13/2022 1535                   Point: Home exercise program (Not Started)     Learner Progress:  Not documented in this visit.          Point: Body mechanics (Not Started)     Learner Progress:  Not documented in this visit.          Point: Precautions (In Progress)     Learning Progress Summary           Patient Acceptance, E, NR by  at 9/13/2022 1535                               User Key     Initials Effective Dates Name Provider Type Discipline     04/25/21 -  Gissell Chaudhary, PT Physical Therapist PT              PT Recommendation and Plan     Progress Summary (PT)  Progress Toward Functional Goals (PT): progress toward functional goals is good   Outcome Measures     Row Name 09/20/22 1044 09/19/22 1440          How much help from another person do you currently need...    Turning from your back to your side while in flat bed without using bedrails? 4  -WM 4  -WM     Moving from lying on back to sitting on the side of a flat bed without bedrails? 4  -WM 4  -WM     Moving to and from a bed to a chair (including a wheelchair)? 3  -WM 3  -WM     Standing up from a chair using your arms (e.g., wheelchair, bedside chair)? 4  -WM 4  -WM     Climbing 3-5 steps with a railing? 3  -WM 3  -WM     To walk in hospital room? 3  -WM 3  -WM     AM-PAC 6 Clicks Score (PT) 21  -WM 21  -WM           User Key  (r) = Recorded By, (t) = Taken By, (c) = Cosigned By    Initials Name Provider Type    Florentino Yanes PTA Physical Therapist Assistant                 Time Calculation:    PT Charges     Row Name 09/20/22 1039             Time Calculation    PT  Received On 09/20/22  -WM              Timed Charges    42089 - PT Therapeutic Exercise Minutes 30  -WM      00594 - Gait Training Minutes  8  -WM      10488 - PT Therapeutic Activity Minutes 4  -WM              SNF Physical Therapy Minutes    Skilled Minutes- PT 42 min  -WM              Total Minutes    Timed Charges Total Minutes 42  -WM       Total Minutes 42  -WM            User Key  (r) = Recorded By, (t) = Taken By, (c) = Cosigned By    Initials Name Provider Type     Florentino Baum PTA Physical Therapist Assistant              Therapy Charges for Today     Code Description Service Date Service Provider Modifiers Qty    39144342800 HC PT THER PROC EA 15 MIN 9/19/2022 Florentino Baum, URSULA GP 2    60652177602 HC GAIT TRAINING EA 15 MIN 9/19/2022 Florentino Baum, URSULA GP 1    37098651477 HC PT THER PROC EA 15 MIN 9/20/2022 Florentino Baum, PTA GP 2    09585405116 HC GAIT TRAINING EA 15 MIN 9/20/2022 Florentino Baum, PTA GP 1          PT G-Codes  Outcome Measure Options: AM-PAC 6 Clicks Daily Activity (OT), Optimal Instrument  AM-PAC 6 Clicks Score (PT): 21  AM-PAC 6 Clicks Score (OT): 21    Florentino Baum PTA  9/20/2022

## 2022-09-20 NOTE — THERAPY TREATMENT NOTE
SNF - Speech Language Pathology   Swallow Treatment Note  [facility]     Patient Name: Hai Gold  : 1955  MRN: [id number]  Today's Date: 2022               Admit Date: 2022    Visit Dx:     ICD-10-CM ICD-9-CM   1. Decreased activities of daily living (ADL)  Z78.9 V49.89   2. Dysphagia, oropharyngeal  R13.12 787.22   3. Difficulty walking  R26.2 719.7     Patient Active Problem List   Diagnosis   • Pneumonia   • Iron deficiency anemia   • Anemia   • CHF (congestive heart failure) (HCC)   • Acute systolic heart failure (CMS/HCC)   • Cytokine release syndrome, grade 1   • Cytokine release syndrome, grade 2   • Expressive aphasia   • Dysphagia   • Dysarthria   • Generalized weakness   • Bradycardia   • Dizziness     Past Medical History:   Diagnosis Date   • Arthritis    • CHF (congestive heart failure) (HCC)    • Hypertension      Past Surgical History:   Procedure Laterality Date   • COLONOSCOPY     • COLONOSCOPY N/A 2022    Procedure: COLONOSCOPY WITH POLYPECTOMIES;  Surgeon: Min Campbell MD;  Location: [facility] ENDOSCOPY;  Service: Gastroenterology;  Laterality: N/A;  COLON POLYPS, DIVERTICULOSIS   • ENDOSCOPY N/A 2022    Procedure: ESOPHAGOGASTRODUODENOSCOPY WITH BIOPSIES;  Surgeon: Min Campbell MD;  Location: [facility] ENDOSCOPY;  Service: Gastroenterology;  Laterality: N/A;  HIATAL HERNIA   • HEMORRHOIDECTOMY         SPEECH PATHOLOGY DYSPHAGIA TREATMENT    Subjective/Behavioral Observations: Patient seen for dysphagia therapy    Current Diet:  Mechanical soft - thin liquids  Current Strategies: Double swallow, alternate with liquids    Treatment received: Patient seen at bedside during morning meal.  Tolerates mechanical soft and thin liquids .  Patient is able to verbalize and demonstrate compensatory swallow strategies.  No clinical signs or symptoms of aspiration were noted.  Patient report improved intake and denies any globus sensation or retrosternal  sensation of food sticking.  Vocal quality remained clear throughout.    Results of treatment: As stated      Progress toward goals: Progressing      Barriers to Achieving goals: Medical status        Plan of care:/changes in plan: Mechanical soft diet-ground meats with sauce/gravy, thin liquids  /Solids as needed  Alternate solids and liquids  Double swallow  Oral meds in applesauce crushed if needed.       EDUCATION  The patient has been educated in the following areas:   Dysphagia (Swallowing Impairment).       No further dysphagia therapy is indicated at this time.  Available for evaluation of medical status warrants.    Treatment Time: 20 min       Irma Gardiner, SLP  9/20/2022

## 2022-09-20 NOTE — PROGRESS NOTES
[facility]     Progress Note    Patient Name: Hai Gold  : 1955  MRN: [id number]  Primary Care Physician:  Shelly Mejia MD  Date of admission: 2022      Subjective   Brief summary.  Admitted for generalized weakness      HPI:  RN reported patient constipated.  Stool softeners started yesterday.  Also concerned about his methotrexate and Enbrel.  Patient received Enbrel yesterday      Review of Systems     Complains of fatigue, some constipation but had BM yesterday  Poor p.o. intake      Objective     Vitals:   Temp:  [98.3 °F (36.8 °C)-98.4 °F (36.9 °C)] 98.3 °F (36.8 °C)  Heart Rate:  [61-72] 61  Resp:  [16] 16  BP: ()/(62-64) 103/64    Physical Exam :     Elderly male not in acute distress  Heart regular and lungs clear  Abdomen soft and scaphoid nontender  Extremities no edema      Result Review:  I have personally reviewed the results from the time of this admission to 2022 17:44 EDT and agree with these findings:  []  Laboratory  []  Microbiology  []  Radiology  []  EKG/Telemetry   []  Cardiology/Vascular   []  Pathology  []  Old records  []  Other:           Assessment / Plan       Active Hospital Problems:  Active Hospital Problems    Diagnosis    • Dizziness    • Bradycardia    • Generalized weakness    • Dysphagia        Plan:   Stable, bradycardia improving.  On low-dose beta-blocker.  Constipation better with increasing Colace as well as adding Dulcolax as needed.   Continue PT OT       DVT prophylaxis:  Medical and mechanical DVT prophylaxis orders are present.    CODE STATUS:   Code Status (Patient has no pulse and is not breathing): CPR (Attempt to Resuscitate)  Medical Interventions (Patient has pulse or is breathing): Full Support  Release to patient: Routine Release              Electronically signed by Lazaro May MD, 22, 5:46 PM EDT.  .

## 2022-09-20 NOTE — PLAN OF CARE
Goal Outcome Evaluation:  Plan of Care Reviewed With: patient   Pt is alert and oriented and calls for assistance when needed. He c/o pain once this shift and was medicated. He is a 1 assist to the bathroom. Will continue with his plan of  care.

## 2022-09-20 NOTE — PLAN OF CARE
Goal Outcome Evaluation:  Plan of Care Reviewed With: patient        Progress: improving  Outcome Evaluation: Alert and oriented x4. Transfers with contact guard using gait belt only. Gait steady without walker. Patient stated is not having any difficulty with swallowing now. Voices needs. Con't current POC.

## 2022-09-21 LAB
INDURATION: 0 MM (ref 0–10)
Lab: NORMAL
Lab: NORMAL
TB SKIN TEST: NEGATIVE

## 2022-09-21 PROCEDURE — 97116 GAIT TRAINING THERAPY: CPT

## 2022-09-21 PROCEDURE — 97110 THERAPEUTIC EXERCISES: CPT

## 2022-09-21 RX ADMIN — DOCUSATE SODIUM 100 MG: 100 CAPSULE, LIQUID FILLED ORAL at 20:29

## 2022-09-21 RX ADMIN — METOPROLOL SUCCINATE 25 MG: 25 TABLET, EXTENDED RELEASE ORAL at 09:43

## 2022-09-21 RX ADMIN — MECLIZINE 12.5 MG: 12.5 TABLET ORAL at 06:24

## 2022-09-21 RX ADMIN — MECLIZINE 12.5 MG: 12.5 TABLET ORAL at 14:25

## 2022-09-21 RX ADMIN — APIXABAN 5 MG: 5 TABLET, FILM COATED ORAL at 09:44

## 2022-09-21 RX ADMIN — FOLIC ACID 1 MG: 1 TABLET ORAL at 09:44

## 2022-09-21 RX ADMIN — DOCUSATE SODIUM 100 MG: 100 CAPSULE, LIQUID FILLED ORAL at 09:43

## 2022-09-21 RX ADMIN — MECLIZINE 12.5 MG: 12.5 TABLET ORAL at 21:53

## 2022-09-21 RX ADMIN — APIXABAN 5 MG: 5 TABLET, FILM COATED ORAL at 20:29

## 2022-09-21 RX ADMIN — LINACLOTIDE 145 MCG: 145 CAPSULE, GELATIN COATED ORAL at 17:05

## 2022-09-21 RX ADMIN — ATORVASTATIN CALCIUM 80 MG: 40 TABLET, FILM COATED ORAL at 20:29

## 2022-09-21 RX ADMIN — PANTOPRAZOLE SODIUM 40 MG: 40 TABLET, DELAYED RELEASE ORAL at 09:43

## 2022-09-21 NOTE — THERAPY TREATMENT NOTE
SNF - Occupational Therapy Treatment Note  [facility]    Patient Name: Hai Gold  : 1955    MRN: [id number]                              Today's Date: 2022       Admit Date: 2022    Visit Dx:     ICD-10-CM ICD-9-CM   1. Decreased activities of daily living (ADL)  Z78.9 V49.89   2. Dysphagia, oropharyngeal  R13.12 787.22   3. Difficulty walking  R26.2 719.7     Patient Active Problem List   Diagnosis   • Pneumonia   • Iron deficiency anemia   • Anemia   • CHF (congestive heart failure) (HCC)   • Acute systolic heart failure (CMS/HCC)   • Cytokine release syndrome, grade 1   • Cytokine release syndrome, grade 2   • Expressive aphasia   • Dysphagia   • Dysarthria   • Generalized weakness   • Bradycardia   • Dizziness     Past Medical History:   Diagnosis Date   • Arthritis    • CHF (congestive heart failure) (HCC)    • Hypertension      Past Surgical History:   Procedure Laterality Date   • COLONOSCOPY     • COLONOSCOPY N/A 2022    Procedure: COLONOSCOPY WITH POLYPECTOMIES;  Surgeon: Min Campbell MD;  Location: [facility] ENDOSCOPY;  Service: Gastroenterology;  Laterality: N/A;  COLON POLYPS, DIVERTICULOSIS   • ENDOSCOPY N/A 2022    Procedure: ESOPHAGOGASTRODUODENOSCOPY WITH BIOPSIES;  Surgeon: Min Campbell MD;  Location: [facility] ENDOSCOPY;  Service: Gastroenterology;  Laterality: N/A;  HIATAL HERNIA   • HEMORRHOIDECTOMY        General Information     Row Name 22 1325          OT Time and Intention    Document Type therapy note (daily note)  -AV     Mode of Treatment individual therapy;occupational therapy  -AV     Row Name 22 1325          General Information    Existing Precautions/Restrictions fall  -AV     Barriers to Rehab none identified  -AV     Row Name 22 1325          Cognition    Orientation Status (Cognition) --  Patient is alert, pleasant and cooperative- able to retain information and follow commands.  -AV     Row Name 22 1325        "   Safety Issues, Functional Mobility    Impairments Affecting Function (Mobility) balance;endurance/activity tolerance  -AV           User Key  (r) = Recorded By, (t) = Taken By, (c) = Cosigned By    Initials Name Provider Type    Tray Parker OT Occupational Therapist                 Mobility/ADL's    No documentation.                Obj/Interventions     Row Name 09/21/22 1326          Shoulder (Therapeutic Exercise)    Shoulder Strengthening (Therapeutic Exercise) bilateral;flexion;extension;horizontal aBduction/aDduction;sitting;2 lb free weight;2 sets;20 repititions  \"Oh I can feel that\"  -AV     Row Name 09/21/22 1326          Elbow/Forearm (Therapeutic Exercise)    Elbow/Forearm Strengthening (Therapeutic Exercise) bilateral;flexion;extension;supination;pronation;sitting;2 lb free weight;2 sets;20 repititions  -AV     Row Name 09/21/22 1326          Motor Skills    Therapeutic Exercise shoulder;elbow/forearm  endurance exercises in supported sitting with rest breaks required.  -AV           User Key  (r) = Recorded By, (t) = Taken By, (c) = Cosigned By    Initials Name Provider Type    Tray Parker OT Occupational Therapist               Goals/Plan    No documentation.                Clinical Impression     Row Name 09/21/22 1327          Pain Assessment    Pretreatment Pain Rating 0/10 - no pain  -AV     Posttreatment Pain Rating 0/10 - no pain  -AV     Row Name 09/21/22 1327          Plan of Care Review    Progress improving  -AV     Outcome Evaluation And readPatient progressing towards goals.  He is able to complete endurance exercises with 2 pound resistance breaks.  Continued OT needed to remediate/compensate for deficits to maximize independence.  -AV     Row Name 09/21/22 1327          Vital Signs    O2 Delivery Pre Treatment room air  -AV     O2 Delivery Intra Treatment room air  -AV     O2 Delivery Post Treatment room air  -AV           User Key  (r) = Recorded By, (t) = Taken By, (c) = " Cosigned By    Initials Name Provider Type    Tray Parker OT Occupational Therapist               Outcome Measures     Row Name 09/21/22 1327          How much help from another is currently needed...    Putting on and taking off regular lower body clothing? 3  -AV     Bathing (including washing, rinsing, and drying) 3  -AV     Toileting (which includes using toilet bed pan or urinal) 3  -AV     Putting on and taking off regular upper body clothing 4  -AV     Taking care of personal grooming (such as brushing teeth) 4  -AV     Eating meals 4  -AV     AM-PAC 6 Clicks Score (OT) 21  -AV     Row Name 09/21/22 1203 09/21/22 1000       How much help from another person do you currently need...    Turning from your back to your side while in flat bed without using bedrails? 4  -WM 4  -FH    Moving from lying on back to sitting on the side of a flat bed without bedrails? 4  -WM 4  -FH    Moving to and from a bed to a chair (including a wheelchair)? 3  -WM 3  -FH    Standing up from a chair using your arms (e.g., wheelchair, bedside chair)? 4  -WM 4  -FH    Climbing 3-5 steps with a railing? 3  -WM 3  -FH    To walk in hospital room? 3  -WM 3  -FH    AM-PAC 6 Clicks Score (PT) 21  -WM 21  -FH    Highest level of mobility 6 --> Walked 10 steps or more  -WM 6 --> Walked 10 steps or more  -    Row Name 09/21/22 1327          Optimal Instrument    Bending/Stooping 1  -AV     Standing 2  -AV     Reaching 1  -AV           User Key  (r) = Recorded By, (t) = Taken By, (c) = Cosigned By    Initials Name Provider Type     Rao Ventura, RN Registered Nurse    Florentino Yanes PTA Physical Therapist Assistant    Tray Parker OT Occupational Therapist              Section G  Mobility  Dressing - self performance: limited assistance (staff provide guided maneuvering of limbs or other non-weight bearing assistance)  Dressing support/assistance: One person assist  Eating - self performance: independent  Eating  support/assistance: No setup or physical help  Toileting - self performance: limited assistance (staff provide guided maneuvering of limbs or other non-weight bearing assistance)  Toileting support/assistance: One person assist  Personal hygiene - self performance: limited assistance (staff provide guided maneuvering of limbs or other non-weight bearing assistance)  Personal hygiene support/assistance: One person assist  Bathing  Bathing - self performance: Physical help with bathing (exclude washing back and hair for patient)  Bathing support/assistance: One person assist     Range of Motion  Upper Extremity: No impairment  Section GG                         Occupational Therapy Education                 Title: PT OT SLP Therapies (In Progress)     Topic: Occupational Therapy (In Progress)     Point: ADL training (Done)     Description:   Instruct learner(s) on proper safety adaptation and remediation techniques during self care or transfers.   Instruct in proper use of assistive devices.              Learning Progress Summary           Patient Acceptance, E, VU by AV at 9/15/2022 1114    Comment: Need for staff assistance for all standing ADLs/transfers  Safe transfer techniques  Safe positioning during ADLs    Acceptance, E, VU by AV at 9/14/2022 1110    Comment: Need for staff assistance for all standing ADLs/transfers  Safe transfer techniques  Safe positioning ADLs    Acceptance, E, VU by AV at 9/13/2022 1050    Comment: Need for staff assistance for all standing ADLs/transfers  Safe positioning ADLs  Safe transfer techniques                   Point: Home exercise program (Not Started)     Description:   Instruct learner(s) on appropriate technique for monitoring, assisting and/or progressing therapeutic exercises/activities.              Learner Progress:  Not documented in this visit.          Point: Precautions (Done)     Description:   Instruct learner(s) on prescribed precautions during self-care and  functional transfers.              Learning Progress Summary           Patient Acceptance, E, VU by AV at 9/14/2022 1110    Comment: Need for staff assistance for all standing ADLs/transfers  Safe transfer techniques  Safe positioning ADLs    Acceptance, E, VU by AV at 9/13/2022 1050    Comment: Need for staff assistance for all standing ADLs/transfers  Safe positioning ADLs  Safe transfer techniques                   Point: Body mechanics (Not Started)     Description:   Instruct learner(s) on proper positioning and spine alignment during self-care, functional mobility activities and/or exercises.              Learner Progress:  Not documented in this visit.                      User Key     Initials Effective Dates Name Provider Type Discipline    AV 06/16/21 -  Tray Marcelo, OT Occupational Therapist OT              OT Recommendation and Plan  Planned Therapy Interventions (OT): activity tolerance training, BADL retraining, functional balance retraining, occupation/activity based interventions, patient/caregiver education/training, transfer/mobility retraining, strengthening exercise  Therapy Frequency (OT): 5 times/wk  Plan of Care Review  Plan of Care Reviewed With: patient  Progress: improving  Outcome Evaluation: And readPatient progressing towards goals.  He is able to complete endurance exercises with 2 pound resistance breaks.  Continued OT needed to remediate/compensate for deficits to maximize independence.     Time Calculation:    Time Calculation- OT     Row Name 09/21/22 1328 09/21/22 1157          Time Calculation- OT    OT Received On 09/21/22 -AV --     OT Goal Re-Cert Due Date 10/13/22  -AV --            Timed Charges    47406 - OT Therapeutic Exercise Minutes 25  -AV --     34037 - Gait Training Minutes  -- 8  -WM            SNF Occupational Therapy Minutes    Skilled Minutes- OT 25 min  -AV --            Total Minutes    Timed Charges Total Minutes 25  -AV 8  -WM      Total Minutes 25  -AV 8   -           User Key  (r) = Recorded By, (t) = Taken By, (c) = Cosigned By    Initials Name Provider Type     Florentino Baum, URSULA Physical Therapist Assistant    Tray Parker OT Occupational Therapist              Therapy Charges for Today     Code Description Service Date Service Provider Modifiers Qty    40535609187 HC OT SELF CARE/MGMT/TRAIN EA 15 MIN 9/20/2022 Tray Marcelo OT GO 2    48959877016 HC OT THER PROC EA 15 MIN 9/21/2022 Tray Marcelo OT GO 2               Tray Marcelo OT  9/21/2022

## 2022-09-21 NOTE — PLAN OF CARE
Goal Outcome Evaluation:  Plan of Care Reviewed With: patient        Progress: improving  Outcome Evaluation: Alert and oriented x4. Denies pain. Is standby assist with ambulation. Gait steady. Voiced concern over hard stool when he had BM today. Stated was on Linzess at home for constipation daily. Provider notified and med ordered for here. Patient voices needs. Is scheduled to d/c Friday. Provder aware.

## 2022-09-21 NOTE — THERAPY TREATMENT NOTE
SNF - Physical Therapy Treatment Note  [facility]     Patient Name: Hai Gold  : 1955  MRN: [id number]  Today's Date: 2022      Visit Dx:     ICD-10-CM ICD-9-CM   1. Decreased activities of daily living (ADL)  Z78.9 V49.89   2. Dysphagia, oropharyngeal  R13.12 787.22   3. Difficulty walking  R26.2 719.7     Patient Active Problem List   Diagnosis   • Pneumonia   • Iron deficiency anemia   • Anemia   • CHF (congestive heart failure) (HCC)   • Acute systolic heart failure (CMS/HCC)   • Cytokine release syndrome, grade 1   • Cytokine release syndrome, grade 2   • Expressive aphasia   • Dysphagia   • Dysarthria   • Generalized weakness   • Bradycardia   • Dizziness     Past Medical History:   Diagnosis Date   • Arthritis    • CHF (congestive heart failure) (HCC)    • Hypertension      Past Surgical History:   Procedure Laterality Date   • COLONOSCOPY     • COLONOSCOPY N/A 2022    Procedure: COLONOSCOPY WITH POLYPECTOMIES;  Surgeon: Min Campbell MD;  Location: [facility] ENDOSCOPY;  Service: Gastroenterology;  Laterality: N/A;  COLON POLYPS, DIVERTICULOSIS   • ENDOSCOPY N/A 2022    Procedure: ESOPHAGOGASTRODUODENOSCOPY WITH BIOPSIES;  Surgeon: Min Campbell MD;  Location: [facility] ENDOSCOPY;  Service: Gastroenterology;  Laterality: N/A;  HIATAL HERNIA   • HEMORRHOIDECTOMY       PT Assessment (last 12 hours)     PT Evaluation and Treatment     Row Name 22 1159          Physical Therapy Time and Intention    Subjective Information no complaints  -WM     Document Type therapy note (daily note)  -WM     Mode of Treatment individual therapy;physical therapy  -WM     Patient Effort good  -WM     Symptoms Noted During/After Treatment fatigue  -WM     Row Name 22 1153          Pain Scale: FACES Pre/Post-Treatment    Pain: FACES Scale, Pretreatment 0-->no hurt  -WM     Posttreatment Pain Rating 0-->no hurt  -WM     Row Name 22 8736          Cognition    Affect/Mental  Status (Cognition) WNL  -     Row Name 09/21/22 1159          Transfers    Sit-Stand [place] (Transfers) standby assist  -     Stand-Sit [place] (Transfers) standby assist  -     Row Name 09/21/22 1159          Sit-Stand Transfer    Assistive Device (Sit-Stand Transfers) --  No AD  -WM     Row Name 09/21/22 1159          Stand-Sit Transfer    Assistive Device (Stand-Sit Transfers) --  No AD  -[facility] Name 09/21/22 1159          Gait/Stairs (Locomotion)    [place] (Gait) standby assist  -     Assistive Device (Gait) --  No AD  -WM     Distance in Feet (Gait) 300  -     Deviations/Abnormal Patterns (Gait) stride length decreased  -     Row Name 09/21/22 1159          Safety Issues, Functional Mobility    Impairments Affecting Function (Mobility) balance;endurance/activity tolerance;strength  -[facility] Name 09/21/22 1159          Hip (Therapeutic Exercise)    Hip Strengthening (Therapeutic Exercise) bilateral;aBduction;aDduction;marching while seated;mini squats;sitting;standing;2 lb free weight;resistance band;green;15 repititions;2 sets  Forward/backward walking, side stepping, weight shifting in parallel bars  -[facility] Name 09/21/22 1159          Knee (Therapeutic Exercise)    Knee Strengthening (Therapeutic Exercise) bilateral;LAQ (long arc quad);hamstring curls;sitting;2 lb free weight;resistance band;green;15 repititions;2 sets  -[facility] Name 09/21/22 1159          Ankle (Therapeutic Exercise)    Ankle Strengthening (Therapeutic Exercise) bilateral;standing;15 repititions;2 sets  Heel raises in parallel bars  -[facility] Name 09/21/22 1159          Aerobic Exercise    Time Performed (Aerobic Exercise) Nustep x 15 minutes, level 2  -     Row Name 09/21/22 1159          Progress Summary (PT)    Progress Toward Functional Goals (PT) progress toward functional goals is good  -           User Key  (r) = Recorded By, (t) = Taken By, (c) = Cosigned By    Initials Name  Provider Type    Florentino Yanes PTA Physical Therapist Assistant                Physical Therapy Education                 Title: PT OT SLP Therapies (In Progress)     Topic: Physical Therapy (In Progress)     Point: Mobility training (In Progress)     Learning Progress Summary           Patient Acceptance, E, NR by  at 9/13/2022 1535                   Point: Home exercise program (Not Started)     Learner Progress:  Not documented in this visit.          Point: Body mechanics (Not Started)     Learner Progress:  Not documented in this visit.          Point: Precautions (In Progress)     Learning Progress Summary           Patient Acceptance, E, NR by  at 9/13/2022 1535                               User Key     Initials Effective Dates Name Provider Type Discipline     04/25/21 -  Gissell Chaudhary, MARIAH Physical Therapist PT              PT Recommendation and Plan     Progress Summary (PT)  Progress Toward Functional Goals (PT): progress toward functional goals is good   Outcome Measures     Row Name 09/21/22 1203 09/20/22 1044 09/19/22 1440       How much help from another person do you currently need...    Turning from your back to your side while in flat bed without using bedrails? 4  -WM 4  -WM 4  -WM    Moving from lying on back to sitting on the side of a flat bed without bedrails? 4  -WM 4  -WM 4  -WM    Moving to and from a bed to a chair (including a wheelchair)? 3  -WM 3  -WM 3  -WM    Standing up from a chair using your arms (e.g., wheelchair, bedside chair)? 4  -WM 4  -WM 4  -WM    Climbing 3-5 steps with a railing? 3  -WM 3  -WM 3  -WM    To walk in hospital room? 3  -WM 3  -WM 3  -WM    AM-PAC 6 Clicks Score (PT) 21  -WM 21  -WM 21  -WM          User Key  (r) = Recorded By, (t) = Taken By, (c) = Cosigned By    Initials Name Provider Type    Florentino Yanes PTA Physical Therapist Assistant                 Time Calculation:    PT Charges     Row Name 09/21/22 1157             Time  Calculation    PT Received On 09/21/22  -WM              Timed Charges    86491 - PT Therapeutic Exercise Minutes 30  -WM      42484 - Gait Training Minutes  8  -WM      35505 - PT Therapeutic Activity Minutes 3  -WM              SNF Physical Therapy Minutes    Skilled Minutes- PT 41 min  -WM              Total Minutes    Timed Charges Total Minutes 41  -WM       Total Minutes 41  -WM            User Key  (r) = Recorded By, (t) = Taken By, (c) = Cosigned By    Initials Name Provider Type     Florentino Baum PTA Physical Therapist Assistant              Therapy Charges for Today     Code Description Service Date Service Provider Modifiers Qty    85641425227 HC PT THER PROC EA 15 MIN 9/20/2022 Florentino Baum, URSULA GP 2    26369825264 HC GAIT TRAINING EA 15 MIN 9/20/2022 Florentino Baum, URSULA GP 1    15659622068 HC PT THER PROC EA 15 MIN 9/21/2022 Florentino Baum, PTA GP 2    59887225992 HC GAIT TRAINING EA 15 MIN 9/21/2022 Florentino Baum, PTA GP 1          PT G-Codes  Outcome Measure Options: AM-PAC 6 Clicks Daily Activity (OT), Optimal Instrument  AM-PAC 6 Clicks Score (PT): 21  AM-PAC 6 Clicks Score (OT): 21    Florentino Baum PTA  9/21/2022

## 2022-09-21 NOTE — PLAN OF CARE
Goal Outcome Evaluation:  Plan of Care Reviewed With: patient    Pt is AO x 4 and VSS. He is a standby assist to the bathroom with a gait belt. He did c/o pain x 1 and was medicated and showed signs of relief. Will continue with his plan of care.

## 2022-09-21 NOTE — SIGNIFICANT NOTE
Called to see patient, chronic constipation getting worse patient takes Linzess at home would like to restart, overall feeling better and getting up and walking without much help.  Had a large BM today.  Wants to get his Linzess.  Plans to go home on Friday.      Exam essentially unchanged  Vital signs reviewed    Continue current meds   Add Linzess, hospital formulary does not contain Linzess.  Patient wife will bring his Linzess from home.        Electronically signed by Lazaro May MD, 09/21/22, 6:42 PM EDT.

## 2022-09-22 PROCEDURE — 97110 THERAPEUTIC EXERCISES: CPT

## 2022-09-22 PROCEDURE — 97535 SELF CARE MNGMENT TRAINING: CPT

## 2022-09-22 PROCEDURE — 97116 GAIT TRAINING THERAPY: CPT

## 2022-09-22 RX ADMIN — APIXABAN 5 MG: 5 TABLET, FILM COATED ORAL at 20:09

## 2022-09-22 RX ADMIN — MECLIZINE 12.5 MG: 12.5 TABLET ORAL at 06:09

## 2022-09-22 RX ADMIN — PANTOPRAZOLE SODIUM 40 MG: 40 TABLET, DELAYED RELEASE ORAL at 09:31

## 2022-09-22 RX ADMIN — MECLIZINE 12.5 MG: 12.5 TABLET ORAL at 14:26

## 2022-09-22 RX ADMIN — APIXABAN 5 MG: 5 TABLET, FILM COATED ORAL at 09:31

## 2022-09-22 RX ADMIN — METOPROLOL SUCCINATE 25 MG: 25 TABLET, EXTENDED RELEASE ORAL at 09:31

## 2022-09-22 RX ADMIN — LINACLOTIDE 145 MCG: 145 CAPSULE, GELATIN COATED ORAL at 07:56

## 2022-09-22 RX ADMIN — FOLIC ACID 1 MG: 1 TABLET ORAL at 09:31

## 2022-09-22 RX ADMIN — MECLIZINE 12.5 MG: 12.5 TABLET ORAL at 21:14

## 2022-09-22 RX ADMIN — ATORVASTATIN CALCIUM 80 MG: 40 TABLET, FILM COATED ORAL at 20:09

## 2022-09-22 NOTE — THERAPY TREATMENT NOTE
SNF - Physical Therapy Treatment Note  [facility]     Patient Name: Hai Gold  : 1955  MRN: [id number]  Today's Date: 2022      Visit Dx:     ICD-10-CM ICD-9-CM   1. Decreased activities of daily living (ADL)  Z78.9 V49.89   2. Dysphagia, oropharyngeal  R13.12 787.22   3. Difficulty walking  R26.2 719.7     Patient Active Problem List   Diagnosis   • Pneumonia   • Iron deficiency anemia   • Anemia   • CHF (congestive heart failure) (HCC)   • Acute systolic heart failure (CMS/HCC)   • Cytokine release syndrome, grade 1   • Cytokine release syndrome, grade 2   • Expressive aphasia   • Dysphagia   • Dysarthria   • Generalized weakness   • Bradycardia   • Dizziness     Past Medical History:   Diagnosis Date   • Arthritis    • CHF (congestive heart failure) (HCC)    • Hypertension      Past Surgical History:   Procedure Laterality Date   • COLONOSCOPY     • COLONOSCOPY N/A 2022    Procedure: COLONOSCOPY WITH POLYPECTOMIES;  Surgeon: Min Campbell MD;  Location: [facility] ENDOSCOPY;  Service: Gastroenterology;  Laterality: N/A;  COLON POLYPS, DIVERTICULOSIS   • ENDOSCOPY N/A 2022    Procedure: ESOPHAGOGASTRODUODENOSCOPY WITH BIOPSIES;  Surgeon: Min Campbell MD;  Location: [facility] ENDOSCOPY;  Service: Gastroenterology;  Laterality: N/A;  HIATAL HERNIA   • HEMORRHOIDECTOMY       PT Assessment (last 12 hours)     PT Evaluation and Treatment     Row Name 22 112          Physical Therapy Time and Intention    Subjective Information no complaints  -WM     Document Type therapy note (daily note)  -WM     Mode of Treatment individual therapy;physical therapy  -WM     Patient Effort good  -WM     Symptoms Noted During/After Treatment fatigue  -WM     Row Name 22 112          Pain Scale: FACES Pre/Post-Treatment    Pain: FACES Scale, Pretreatment 0-->no hurt  -WM     Posttreatment Pain Rating 0-->no hurt  -WM     Row Name 22 112          Cognition    Affect/Mental  Status (Cognition) WNL  -     Row Name 09/22/22 1126          Transfers    Sit-Stand [place] (Transfers) independent  -     Stand-Sit [place] (Transfers) independent  -     Row Name 09/22/22 1126          Sit-Stand Transfer    Assistive Device (Sit-Stand Transfers) --  No AD  -     Row Name 09/22/22 1126          Stand-Sit Transfer    Assistive Device (Stand-Sit Transfers) --  No AD  -     Row Name 09/22/22 1126          Gait/Stairs (Locomotion)    [place] Level (Gait) supervision  -     Assistive Device (Gait) --  No AD  -WM     Distance in Feet (Gait) 300  -     Deviations/Abnormal Patterns (Gait) stride length decreased  -     [place] Level (Stairs) stand by assist  -     Handrail Location (Stairs) right side (ascending);left side (descending)  -     Number of Steps (Stairs) 12  -     Ascending Technique (Stairs) step-over-step  -WM     Descending Technique (Stairs) step-over-step  -WM     Stairs, Impairments strength decreased;impaired balance  -     Comment, (Gait/Stairs) Pt is indepwndent with ambulating in his room.  -     Row Name 09/22/22 1126          Safety Issues, Functional Mobility    Impairments Affecting Function (Mobility) balance;endurance/activity tolerance;strength  -[facility] Name 09/22/22 1126          Hip (Therapeutic Exercise)    Hip Strengthening (Therapeutic Exercise) bilateral;mini squats;standing;15 repititions;2 sets  Forward/backward walking, side stepping, weight shifting in parallel bars  -[facility] Name 09/22/22 1126          Ankle (Therapeutic Exercise)    Ankle Strengthening (Therapeutic Exercise) bilateral;standing;15 repititions;2 sets  Heel raises in parallel bars  -[facility] Name 09/22/22 1126          Aerobic Exercise    Time Performed (Aerobic Exercise) Nustep x 15 minutes, level 2  -[facility] Name 09/22/22 1126          Progress Summary (PT)    Progress Toward Functional Goals (PT) progress toward functional goals is good   -WM           User Key  (r) = Recorded By, (t) = Taken By, (c) = Cosigned By    Initials Name Provider Type    WM Florentino Baum PTA Physical Therapist Assistant                Physical Therapy Education                 Title: PT OT SLP Therapies (In Progress)     Topic: Physical Therapy (In Progress)     Point: Mobility training (In Progress)     Learning Progress Summary           Patient Acceptance, E, NR by  at 9/13/2022 1535                   Point: Home exercise program (Not Started)     Learner Progress:  Not documented in this visit.          Point: Body mechanics (Not Started)     Learner Progress:  Not documented in this visit.          Point: Precautions (In Progress)     Learning Progress Summary           Patient Acceptance, E, NR by CS at 9/13/2022 1535                               User Key     Initials Effective Dates Name Provider Type Discipline     04/25/21 -  Gissell Chaudhary PT Physical Therapist PT              PT Recommendation and Plan     Progress Summary (PT)  Progress Toward Functional Goals (PT): progress toward functional goals is good   Outcome Measures     Row Name 09/22/22 1134 09/21/22 1203 09/20/22 1044       How much help from another person do you currently need...    Turning from your back to your side while in flat bed without using bedrails? 4  -WM 4  -WM 4  -WM    Moving from lying on back to sitting on the side of a flat bed without bedrails? 4  -WM 4  -WM 4  -WM    Moving to and from a bed to a chair (including a wheelchair)? 4  -WM 3  -WM 3  -WM    Standing up from a chair using your arms (e.g., wheelchair, bedside chair)? 4  -WM 4  -WM 4  -WM    Climbing 3-5 steps with a railing? 3  -WM 3  -WM 3  -WM    To walk in hospital room? 4  -WM 3  -WM 3  -WM    AM-PAC 6 Clicks Score (PT) 23  -WM 21  -WM 21  -WM    Row Name 09/19/22 1440             How much help from another person do you currently need...    Turning from your back to your side while in flat bed without using  bedrails? 4  -WM      Moving from lying on back to sitting on the side of a flat bed without bedrails? 4  -WM      Moving to and from a bed to a chair (including a wheelchair)? 3  -WM      Standing up from a chair using your arms (e.g., wheelchair, bedside chair)? 4  -WM      Climbing 3-5 steps with a railing? 3  -WM      To walk in hospital room? 3  -WM      AM-PAC 6 Clicks Score (PT) 21  -WM            User Key  (r) = Recorded By, (t) = Taken By, (c) = Cosigned By    Initials Name Provider Type    Florentino Yanes PTA Physical Therapist Assistant                 Time Calculation:    PT Charges     Row Name 09/22/22 1125             Time Calculation    PT Received On 09/22/22  -WM              Timed Charges    29950 - PT Therapeutic Exercise Minutes 29  -WM      83512 - Gait Training Minutes  12  -WM      68759 - PT Therapeutic Activity Minutes 3  -WM              SNF Physical Therapy Minutes    Skilled Minutes- PT 44 min  -WM              Total Minutes    Timed Charges Total Minutes 44  -WM       Total Minutes 44  -WM            User Key  (r) = Recorded By, (t) = Taken By, (c) = Cosigned By    Initials Name Provider Type    Florentino Yanes PTA Physical Therapist Assistant              Therapy Charges for Today     Code Description Service Date Service Provider Modifiers Qty    52183424613 HC PT THER PROC EA 15 MIN 9/21/2022 Florentino Baum PTA GP 2    36415861645 HC GAIT TRAINING EA 15 MIN 9/21/2022 Florentino Baum PTA GP 1    17483450335 HC PT THER PROC EA 15 MIN 9/22/2022 Florentino Baum PTA GP 2    28944387832 HC GAIT TRAINING EA 15 MIN 9/22/2022 Florentino Baum PTA GP 1          PT G-Codes  Outcome Measure Options: AM-PAC 6 Clicks Daily Activity (OT), Optimal Instrument  AM-PAC 6 Clicks Score (PT): 23  AM-PAC 6 Clicks Score (OT): 21    Florentino Baum PTA  9/22/2022

## 2022-09-22 NOTE — THERAPY TREATMENT NOTE
SNF - Occupational Therapy Treatment Note  [facility]    Patient Name: Hai Gold  : 1955    MRN: [id number]                              Today's Date: 2022       Admit Date: 2022    Visit Dx:     ICD-10-CM ICD-9-CM   1. Decreased activities of daily living (ADL)  Z78.9 V49.89   2. Dysphagia, oropharyngeal  R13.12 787.22   3. Difficulty walking  R26.2 719.7     Patient Active Problem List   Diagnosis   • Pneumonia   • Iron deficiency anemia   • Anemia   • CHF (congestive heart failure) (HCC)   • Acute systolic heart failure (CMS/HCC)   • Cytokine release syndrome, grade 1   • Cytokine release syndrome, grade 2   • Expressive aphasia   • Dysphagia   • Dysarthria   • Generalized weakness   • Bradycardia   • Dizziness     Past Medical History:   Diagnosis Date   • Arthritis    • CHF (congestive heart failure) (HCC)    • Hypertension      Past Surgical History:   Procedure Laterality Date   • COLONOSCOPY     • COLONOSCOPY N/A 2022    Procedure: COLONOSCOPY WITH POLYPECTOMIES;  Surgeon: Min Campbell MD;  Location: [facility] ENDOSCOPY;  Service: Gastroenterology;  Laterality: N/A;  COLON POLYPS, DIVERTICULOSIS   • ENDOSCOPY N/A 2022    Procedure: ESOPHAGOGASTRODUODENOSCOPY WITH BIOPSIES;  Surgeon: Min Campbell MD;  Location: [facility] ENDOSCOPY;  Service: Gastroenterology;  Laterality: N/A;  HIATAL HERNIA   • HEMORRHOIDECTOMY        General Information     Row Name 22 1316          OT Time and Intention    Document Type therapy note (daily note)  -AV     Mode of Treatment individual therapy;occupational therapy  -AV     Row Name 22 1316          General Information    Existing Precautions/Restrictions fall  -AV     Barriers to Rehab none identified  -AV     Row Name 22 1316          Cognition    Orientation Status (Cognition) --  Patient is alert, pleasant and cooperative- able to retain information and follow commands.  -AV     Row Name 22 1316           Safety Issues, Functional Mobility    Impairments Affecting Function (Mobility) balance;endurance/activity tolerance  -AV           User Key  (r) = Recorded By, (t) = Taken By, (c) = Cosigned By    Initials Name Provider Type    Tray Parker OT Occupational Therapist                 Mobility/ADL's     Row Name 09/22/22 1316          Bed Mobility    Supine-Sit [place] (Bed Mobility) independent  -AV     Assistive Device (Bed Mobility) bed rails  -AV     Comment, (Bed Mobility) In preparation for ADLs out of bed  -AV     Row Name 09/22/22 1316          Transfers    Transfers bed-chair transfer;sit-stand transfer  -AV     Bed-Chair [place] (Transfers) independent  -AV     Sit-Stand [place] (Transfers) independent  -AV     Row Name 09/22/22 1316          Sit-Stand Transfer    Comment, (Sit-Stand Transfer) X4 during functional ADL session  -AV     Row Name 09/22/22 1316          Activities of Daily Living    BADL Assessment/Intervention --  Independent bathing/dressing with set up due to hospital setting.  -AV           User Key  (r) = Recorded By, (t) = Taken By, (c) = Cosigned By    Initials Name Provider Type    Tray Parker OT Occupational Therapist               Obj/Interventions     Row Name 09/22/22 1317          Shoulder (Therapeutic Exercise)    Shoulder Strengthening (Therapeutic Exercise) bilateral;flexion;horizontal aBduction/aDduction;sitting;2 lb free weight;3 lb free weight;2 sets;20 repititions  -AV     Row Name 09/22/22 1317          Elbow/Forearm (Therapeutic Exercise)    Elbow/Forearm Strengthening (Therapeutic Exercise) bilateral;flexion;extension;supination;pronation;sitting;2 lb free weight;3 lb free weight;2 sets;20 repititions  -AV     Row Name 09/22/22 1317          Motor Skills    Therapeutic Exercise shoulder;elbow/forearm  Performed exercises seated upright in recliner.  Room air.  Rest breaks required.  -AV     Row Name 09/22/22 1317          Balance    Balance  Assessment sitting dynamic balance;standing dynamic balance  -AV     Dynamic Sitting Balance independent  -AV     Dynamic Standing Balance independent  -AV     Balance Interventions sitting;standing;sit to stand;supported;minimal challenge;occupation based/functional task  -AV           User Key  (r) = Recorded By, (t) = Taken By, (c) = Cosigned By    Initials Name Provider Type    Tray Parker OT Occupational Therapist               Goals/Plan    No documentation.                Clinical Impression     Row Name 09/22/22 1318          Pain Assessment    Pretreatment Pain Rating 0/10 - no pain  -AV     Posttreatment Pain Rating 0/10 - no pain  -AV     Row Name 09/22/22 1318          Plan of Care Review    Progress improving  -AV     Outcome Evaluation Patient independent with basic ADLs/transfers with set up due to hospital setting.  Anticipating discharge home tomorrow.  Tub transfer bench, rolling walker recommended at discharge for optimal safety.  -AV     Row Name 09/22/22 1318          Vital Signs    O2 Delivery Pre Treatment room air  -AV     O2 Delivery Intra Treatment room air  -AV     O2 Delivery Post Treatment room air  -AV           User Key  (r) = Recorded By, (t) = Taken By, (c) = Cosigned By    Initials Name Provider Type    Tray Parker OT Occupational Therapist               Outcome Measures     Row Name 09/22/22 1319          How much help from another is currently needed...    Putting on and taking off regular lower body clothing? 4  -AV     Bathing (including washing, rinsing, and drying) 4  -AV     Toileting (which includes using toilet bed pan or urinal) 4  -AV     Putting on and taking off regular upper body clothing 4  -AV     Taking care of personal grooming (such as brushing teeth) 4  -AV     Eating meals 4  -AV     AM-PAC 6 Clicks Score (OT) 24  -AV     Row Name 09/22/22 1134 09/22/22 0930       How much help from another person do you currently need...    Turning from your back  to your side while in flat bed without using bedrails? 4  -WM 4  -LH    Moving from lying on back to sitting on the side of a flat bed without bedrails? 4  -WM 4  -LH    Moving to and from a bed to a chair (including a wheelchair)? 4  -WM 3  -LH    Standing up from a chair using your arms (e.g., wheelchair, bedside chair)? 4  -WM 4  -LH    Climbing 3-5 steps with a railing? 3  -WM 3  -LH    To walk in hospital room? 4  -WM 3  -LH    AM-PAC 6 Clicks Score (PT) 23  -WM 21  -LH    Highest level of mobility 7 --> Walked 25 feet or more  -WM 6 --> Walked 10 steps or more  -LH    Row Name 09/22/22 1319          Optimal Instrument    Bending/Stooping 1  -AV     Standing 1  -AV     Reaching 1  -AV           User Key  (r) = Recorded By, (t) = Taken By, (c) = Cosigned By    Initials Name Provider Type     Gladys Gu, RN Registered Nurse    Florentino Yanes PTA Physical Therapist Assistant    Tray Parker OT Occupational Therapist              Section G  Mobility  Dressing - self performance: limited assistance (staff provide guided maneuvering of limbs or other non-weight bearing assistance)  Dressing support/assistance: One person assist  Eating - self performance: independent  Eating support/assistance: No setup or physical help  Toileting - self performance: limited assistance (staff provide guided maneuvering of limbs or other non-weight bearing assistance)  Toileting support/assistance: One person assist  Personal hygiene - self performance: limited assistance (staff provide guided maneuvering of limbs or other non-weight bearing assistance)  Personal hygiene support/assistance: One person assist  Bathing  Bathing - self performance: Physical help with bathing (exclude washing back and hair for patient)  Bathing support/assistance: One person assist     Range of Motion  Upper Extremity: No impairment  Section GG                         Occupational Therapy Education                 Title: PT OT SLP  Therapies (In Progress)     Topic: Occupational Therapy (In Progress)     Point: ADL training (Done)     Description:   Instruct learner(s) on proper safety adaptation and remediation techniques during self care or transfers.   Instruct in proper use of assistive devices.              Learning Progress Summary           Patient Acceptance, E, VU by AV at 9/15/2022 1114    Comment: Need for staff assistance for all standing ADLs/transfers  Safe transfer techniques  Safe positioning during ADLs    Acceptance, E, VU by AV at 9/14/2022 1110    Comment: Need for staff assistance for all standing ADLs/transfers  Safe transfer techniques  Safe positioning ADLs    Acceptance, E, VU by AV at 9/13/2022 1050    Comment: Need for staff assistance for all standing ADLs/transfers  Safe positioning ADLs  Safe transfer techniques                   Point: Home exercise program (Not Started)     Description:   Instruct learner(s) on appropriate technique for monitoring, assisting and/or progressing therapeutic exercises/activities.              Learner Progress:  Not documented in this visit.          Point: Precautions (Done)     Description:   Instruct learner(s) on prescribed precautions during self-care and functional transfers.              Learning Progress Summary           Patient Acceptance, E, VU by AV at 9/14/2022 1110    Comment: Need for staff assistance for all standing ADLs/transfers  Safe transfer techniques  Safe positioning ADLs    Acceptance, E, VU by AV at 9/13/2022 1050    Comment: Need for staff assistance for all standing ADLs/transfers  Safe positioning ADLs  Safe transfer techniques                   Point: Body mechanics (Not Started)     Description:   Instruct learner(s) on proper positioning and spine alignment during self-care, functional mobility activities and/or exercises.              Learner Progress:  Not documented in this visit.                      User Key     Initials Effective Dates Name  Provider Type Discipline    AV 06/16/21 -  Tray Marcelo OT Occupational Therapist OT              OT Recommendation and Plan  Planned Therapy Interventions (OT): activity tolerance training, BADL retraining, functional balance retraining, occupation/activity based interventions, patient/caregiver education/training, transfer/mobility retraining, strengthening exercise  Therapy Frequency (OT): 5 times/wk  Plan of Care Review  Plan of Care Reviewed With: patient  Progress: improving  Outcome Evaluation: Patient independent with basic ADLs/transfers with set up due to hospital setting.  Anticipating discharge home tomorrow.  Tub transfer bench, rolling walker recommended at discharge for optimal safety.     Time Calculation:    Time Calculation- OT     Row Name 09/22/22 1320 09/22/22 1125          Time Calculation- OT    OT Received On 09/22/22  -AV --     OT Goal Re-Cert Due Date 10/13/22  -AV --            Timed Charges    43468 - OT Therapeutic Exercise Minutes 25  -AV --     83374 - Gait Training Minutes  -- 12  -WM     07435 - OT Self Care/Mgmt Minutes 30  -AV --            SNF Occupational Therapy Minutes    Skilled Minutes- OT 55 min  -AV --            Total Minutes    Timed Charges Total Minutes 55  -AV 12  -WM      Total Minutes 55  -AV 12  -WM           User Key  (r) = Recorded By, (t) = Taken By, (c) = Cosigned By    Initials Name Provider Type     Florentino Baum PTA Physical Therapist Assistant    AV Tray Marcelo OT Occupational Therapist              Therapy Charges for Today     Code Description Service Date Service Provider Modifiers Qty    14967678305 HC OT THER PROC EA 15 MIN 9/21/2022 Tray Marcelo OT GO 2    83332638441 HC OT THER PROC EA 15 MIN 9/22/2022 Tray Marcelo OT GO 2    34416012376 HC OT SELF CARE/MGMT/TRAIN EA 15 MIN 9/22/2022 Tray Marcelo OT GO 2               Tray Marcelo OT  9/22/2022

## 2022-09-22 NOTE — PLAN OF CARE
Goal Outcome Evaluation:  Plan of Care Reviewed With: patient        Progress: improving  Outcome Evaluation: alert and oriented. Discharge home planned for Friday; resident excited about going home. Ambulating with stand by assist to BR without complaints of dizziness. Continue plan of care. Call light within reach.

## 2022-09-22 NOTE — PLAN OF CARE
Goal Outcome Evaluation:  Plan of Care Reviewed With: patient        Progress: improving   No c/o pain or soa this shift.  Ambulating with minimal assist of 1.

## 2022-09-23 VITALS
HEART RATE: 60 BPM | HEIGHT: 67 IN | DIASTOLIC BLOOD PRESSURE: 64 MMHG | TEMPERATURE: 98 F | SYSTOLIC BLOOD PRESSURE: 118 MMHG | RESPIRATION RATE: 18 BRPM | BODY MASS INDEX: 17.96 KG/M2 | OXYGEN SATURATION: 100 % | WEIGHT: 114.42 LBS

## 2022-09-23 PROBLEM — R42 DIZZINESS: Status: RESOLVED | Noted: 2022-09-14 | Resolved: 2022-09-23

## 2022-09-23 PROBLEM — R00.1 BRADYCARDIA: Status: RESOLVED | Noted: 2022-09-13 | Resolved: 2022-09-23

## 2022-09-23 PROBLEM — R13.10 DYSPHAGIA: Status: RESOLVED | Noted: 2022-09-10 | Resolved: 2022-09-23

## 2022-09-23 RX ORDER — MECLIZINE HCL 12.5 MG/1
12.5 TABLET ORAL 3 TIMES DAILY PRN
Qty: 30 TABLET | Refills: 0 | Status: SHIPPED | OUTPATIENT
Start: 2022-09-23 | End: 2022-10-03

## 2022-09-23 RX ORDER — METOPROLOL SUCCINATE 25 MG/1
25 TABLET, EXTENDED RELEASE ORAL DAILY
Qty: 30 TABLET | Refills: 0 | Status: SHIPPED | OUTPATIENT
Start: 2022-09-23 | End: 2022-10-23

## 2022-09-23 RX ORDER — ONDANSETRON 4 MG/1
4 TABLET, ORALLY DISINTEGRATING ORAL EVERY 6 HOURS PRN
Qty: 15 TABLET | Refills: 0 | Status: SHIPPED | OUTPATIENT
Start: 2022-09-23 | End: 2022-09-28

## 2022-09-23 RX ORDER — PSEUDOEPHEDRINE HCL 30 MG
100 TABLET ORAL 2 TIMES DAILY
Qty: 60 CAPSULE | Refills: 0 | Status: SHIPPED | OUTPATIENT
Start: 2022-09-23 | End: 2022-10-23

## 2022-09-23 RX ADMIN — FOLIC ACID 1 MG: 1 TABLET ORAL at 09:24

## 2022-09-23 RX ADMIN — LINACLOTIDE 145 MCG: 145 CAPSULE, GELATIN COATED ORAL at 07:29

## 2022-09-23 RX ADMIN — METOPROLOL SUCCINATE 25 MG: 25 TABLET, EXTENDED RELEASE ORAL at 09:24

## 2022-09-23 RX ADMIN — APIXABAN 5 MG: 5 TABLET, FILM COATED ORAL at 09:24

## 2022-09-23 RX ADMIN — MECLIZINE 12.5 MG: 12.5 TABLET ORAL at 06:09

## 2022-09-23 RX ADMIN — PANTOPRAZOLE SODIUM 40 MG: 40 TABLET, DELAYED RELEASE ORAL at 09:24

## 2022-09-23 NOTE — PLAN OF CARE
Goal Outcome Evaluation:  Plan of Care Reviewed With: patient        Progress: improving  Outcome Evaluation: Alert and oriented. Independent in room. Discharge planned for today. Continue plan of care. Call light within reach.

## 2022-09-23 NOTE — DISCHARGE SUMMARY
[facility]         DISCHARGE SUMMARY    Patient Name: Hai Gold  : 1955  MRN: [id number]    Date of Admission: 2022  Date of Discharge: 2022  Primary Care Physician: Shelly Mejia MD    Consults     Date and Time Order Name Status Description    2022  6:42 PM Inpatient Cardiology Consult      9/10/2022  4:44 PM Inpatient Neurology Consult Stroke Completed           Presenting Problem:   Generalized weakness [R53.1]    Active and Resolved Hospital Problems:  Active Hospital Problems    Diagnosis POA   • Generalized weakness [R53.1] Yes      Resolved Hospital Problems    Diagnosis POA   • Dizziness [R42] No   • Bradycardia [R00.1] Yes   • Dysphagia [R13.10] Yes         Hospital Course     Hospital Course:  Hai Gold is a 67 y.o. male admitted for inpatient rehab for generalized weakness, patient was admitted with possible stroke earlier to hospital with dysphagia and unsteady gait.  No new stroke was found, patient's wife was unable to take care of him and recommended inpatient rehab.  He was admitted to skilled nursing facility.  Patient had mild episodes of bradycardia for which she was seen by cardiologist Dr. Matheus Argueta who sees patient and adjusted meds  Patient was closely followed by me and cardiologist, no further complications he had occasional dizziness which was treated with Antivert and felt better  He was seen by PT and OT regular therapy was done and patient was able to walk to bathroom on his own without any assistance  He feels comfortable going home  Will be discharged home today        DISCHARGE Follow Up Recommendations for labs and diagnostics:   Discharge to home  Follow-up with PCP next week      Day of Discharge     Vital Signs:  Temp:  [97.4 °F (36.3 °C)-98 °F (36.7 °C)] 98 °F (36.7 °C)  Heart Rate:  [60-74] 60  Resp:  [18] 18  BP: ()/(57-65) 118/64    Physical Exam:    Elderly male cachectic not in acute  distress  Heart regular  Lungs clear   Extremities without any edema  Neuro awake alert and oriented, generalized weakness        Pertinent  and/or Most Recent Results     LAB RESULTS:      Lab 09/18/22  1126   WBC 9.79   HEMOGLOBIN 12.9*   HEMATOCRIT 38.4   PLATELETS 396   NEUTROS ABS 6.94   IMMATURE GRANS (ABS) 0.03   LYMPHS ABS 2.08   MONOS ABS 0.64   EOS ABS 0.05   MCV 89.7         Lab 09/19/22  0440 09/18/22  1126   SODIUM 135* 134*   POTASSIUM 4.8 5.7*   CHLORIDE 100 97*   CO2 25.5 25.2   ANION GAP 9.5 11.8   BUN 24* 23   CREATININE 0.93 1.12   EGFR 90.0 72.0   GLUCOSE 85 80   CALCIUM 9.4 9.6         Lab 09/18/22  1126   TOTAL PROTEIN 9.2*   ALBUMIN 3.40*   GLOBULIN 5.8   ALT (SGPT) 28   AST (SGOT) 28   BILIRUBIN 0.3   ALK PHOS 98                     Brief Urine Lab Results  (Last result in the past 365 days)      Color   Clarity   Blood   Leuk Est   Nitrite   Protein   CREAT   Urine HCG        03/27/22 0639 Yellow   Clear   Negative   Small (1+)   Negative   Negative               Microbiology Results (last 10 days)     ** No results found for the last 240 hours. **          PROCEDURES:    [unfilled]    CT Head Without Contrast    Result Date: 9/10/2022  Impression:   1. No acute intracranial process identified. 2. Stable encephalomalacia within right cerebellum.     LAUREANO GERMAIN MD       Electronically Signed and Approved By: LAUREANO GERMAIN MD on 9/10/2022 at 11:31             MRI Brain Without Contrast    Result Date: 9/11/2022  Impression:   1. The study is slightly limited by motion artifact. 2. No acute ischemia. 3. Chronic ischemic changes. 4. Mild generalized volume loss secondary to cerebral atrophy.     ANDI ARAMBULA MD       Electronically Signed and Approved By: ANDI ARAMBULA MD on 9/11/2022 at 13:59             XR Chest 1 View    Result Date: 9/10/2022  Impression:   1. Lungs are clear. 2. Cardiomegaly.       LAUREANO GERMAIN MD       Electronically Signed and Approved By: LAUREANO GERMAIN MD on  9/10/2022 at 9:09             XR Chest 1 View    Result Date: 9/7/2022  Impression:   1. The heart is prominent in size. 2. Mild bilateral opacities again seen appear improved compared with 8/11/2022.  This may reflect improved pneumonia and/or edema.  There are superimposed atelectasis and/or fibrosis present.       CLAUDY ERNANDEZ MD       Electronically Signed and Approved By: CLAUDY ERNANDEZ MD on 9/07/2022 at 16:55                       Results for orders placed during the hospital encounter of 09/10/22    Adult Transthoracic Echo Complete W/ Cont if Necessary Per Protocol (With Agitated Saline)    Interpretation Summary  · Calculated left ventricular EF = 43.2% Estimated left ventricular EF was in agreement with the calculated left ventricular EF.  · Left ventricular diastolic function is consistent with (grade I) impaired relaxation.  · Estimated right ventricular systolic pressure from tricuspid regurgitation is mildly elevated (35-45 mmHg).      Labs Pending at Discharge:        Discharge Details        Discharge Medications      New Medications      Instructions Start Date   docusate sodium 100 MG capsule   100 mg, Oral, 2 Times Daily      meclizine 12.5 MG tablet  Commonly known as: ANTIVERT   12.5 mg, Oral, 3 Times Daily PRN      ondansetron ODT 4 MG disintegrating tablet  Commonly known as: ZOFRAN-ODT   4 mg, Translingual, Every 6 Hours PRN         Changes to Medications      Instructions Start Date   metoprolol succinate XL 25 MG 24 hr tablet  Commonly known as: TOPROL-XL  What changed: how much to take   25 mg, Oral, Daily         Continue These Medications      Instructions Start Date   apixaban 2.5 MG tablet tablet  Commonly known as: ELIQUIS   2.5 mg, Oral, 2 Times Daily      aspirin 81 MG EC tablet   81 mg, Oral, Daily      Enbrel Mini 50 MG/ML solution cartridge  Generic drug: Etanercept   50 mg, Subcutaneous, Every 7 Days, Mon      folic acid 1 MG tablet  Commonly known as: FOLVITE   1 mg,  Oral, Daily      linaclotide 145 MCG capsule capsule  Commonly known as: LINZESS   145 mcg, Oral, Every Morning Before Breakfast      methotrexate 2.5 MG tablet   12.5 mg, Oral, Weekly, Friday (5 tabs)      oxyCODONE-acetaminophen 7.5-325 MG per tablet  Commonly known as: PERCOCET   1 tablet, Oral, 2 Times Daily PRN      pantoprazole 20 MG EC tablet  Commonly known as: PROTONIX   20 mg, Oral, Daily      Testosterone Enanthate 200 MG/ML solution   200 mg, Intramuscular, Every 14 Days, Wed         Stop These Medications    digoxin 125 MCG tablet  Commonly known as: LANOXIN     furosemide 40 MG tablet  Commonly known as: LASIX     potassium chloride 10 MEQ CR tablet            Allergies   Allergen Reactions   • Penicillins Hives         Discharge Disposition:    Home or Self Care    Diet:    Heart healthy    Discharge Activity:     Activity Instructions     Activity as Tolerated              No future appointments.    Additional Instructions for the Follow-ups that You Need to Schedule     Discharge Follow-up with PCP   As directed       Currently Documented PCP:    Shelly Mejia MD    PCP Phone Number:    [phone]     Follow Up Details: next week               Time spent on Discharge including face to face service: 21 minutes.            I have dictated this note utilizing Dragon Dictation.             Please note that portions of this note were completed with a voice recognition program.             Part of this note may be an electronic transcription/translation of spoken language to printed text         using the Dragon Dictation System.       Electronically signed by Lazaro May MD, 09/23/22, 10:12 AM EDT.

## 2022-09-23 NOTE — THERAPY DISCHARGE NOTE
SNF - Physical Therapy Treatment Note/Discharge   [facility]     Patient Name: Hai Gold  : 1955  MRN: [id number]  Today's Date: 2022                Admit Date: 2022    Visit Dx:    ICD-10-CM ICD-9-CM   1. Decreased activities of daily living (ADL)  Z78.9 V49.89   2. Dysphagia, oropharyngeal  R13.12 787.22   3. Difficulty walking  R26.2 719.7   4. Generalized weakness  R53.1 780.79     Patient Active Problem List   Diagnosis   • Pneumonia   • Iron deficiency anemia   • Anemia   • CHF (congestive heart failure) (HCC)   • Acute systolic heart failure (CMS/HCC)   • Cytokine release syndrome, grade 1   • Cytokine release syndrome, grade 2   • Expressive aphasia   • Dysarthria   • Generalized weakness     Past Medical History:   Diagnosis Date   • Arthritis    • CHF (congestive heart failure) (HCC)    • Hypertension      Past Surgical History:   Procedure Laterality Date   • COLONOSCOPY     • COLONOSCOPY N/A 2022    Procedure: COLONOSCOPY WITH POLYPECTOMIES;  Surgeon: Min Campbell MD;  Location: [facility] ENDOSCOPY;  Service: Gastroenterology;  Laterality: N/A;  COLON POLYPS, DIVERTICULOSIS   • ENDOSCOPY N/A 2022    Procedure: ESOPHAGOGASTRODUODENOSCOPY WITH BIOPSIES;  Surgeon: Min Campbell MD;  Location: [facility] ENDOSCOPY;  Service: Gastroenterology;  Laterality: N/A;  HIATAL HERNIA   • HEMORRHOIDECTOMY         PT Assessment (last 12 hours)     PT Evaluation and Treatment     Row Name 22 1303          Progress Summary (PT)    Reason for Discharge (PT) patient discharged from this facility  -     Row Name 22 1303          Discharge Summary (PT)    Outcomes Achieved/Progress Made Upon Discharge (PT) goals partially achieved prior to discharge  -     Transfer to Another Level of Care or Facility (PT) home with supervision recommended  -     Discharge Summary Statement (PT) At time of discharge, the patient was ambulating 300 feet independently without an  assistive device. He is able to navigate 12 steps using single handrail and SBA. He is independent with bed mobility and transfers.  -WM           User Key  (r) = Recorded By, (t) = Taken By, (c) = Cosigned By    Initials Name Provider Type    Florentino Yanes PTA Physical Therapist Assistant              Section G              Section GG                         Physical Therapy Education                 Title: PT OT SLP Therapies (In Progress)     Topic: Physical Therapy (In Progress)     Point: Mobility training (In Progress)     Learning Progress Summary           Patient Acceptance, E, NR by  at 9/13/2022 1535                   Point: Home exercise program (Not Started)     Learner Progress:  Not documented in this visit.          Point: Body mechanics (Not Started)     Learner Progress:  Not documented in this visit.          Point: Precautions (In Progress)     Learning Progress Summary           Patient Acceptance, E, NR by  at 9/13/2022 1535                               User Key     Initials Effective Dates Name Provider Type Discipline     04/25/21 -  Gissell Chaudhary, PT Physical Therapist PT                PT Recommendation and Plan     Progress Summary (PT)  Progress Toward Functional Goals (PT): progress toward functional goals is good     Outcome Measures     Row Name 09/22/22 1134 09/21/22 1203          How much help from another person do you currently need...    Turning from your back to your side while in flat bed without using bedrails? 4  -WM 4  -WM     Moving from lying on back to sitting on the side of a flat bed without bedrails? 4  -WM 4  -WM     Moving to and from a bed to a chair (including a wheelchair)? 4  -WM 3  -WM     Standing up from a chair using your arms (e.g., wheelchair, bedside chair)? 4  -WM 4  -WM     Climbing 3-5 steps with a railing? 3  -WM 3  -WM     To walk in hospital room? 4  -WM 3  -WM     AM-PAC 6 Clicks Score (PT) 23  -WM 21  -WM           User Key  (r) =  Recorded By, (t) = Taken By, (c) = Cosigned By    Initials Name Provider Type     Florentino Baum PTA Physical Therapist Assistant                 Time Calculation:     Therapy Charges for Today     Code Description Service Date Service Provider Modifiers Qty    [id number]  PT THER PROC EA 15 MIN 9/22/2022 Florentino Baum PTA GP 2    [id number] HC GAIT TRAINING EA 15 MIN 9/22/2022 Florentino Baum PTA GP 1          PT G-Codes  Outcome Measure Options: AM-PAC 6 Clicks Daily Activity (OT), Optimal Instrument  AM-PAC 6 Clicks Score (PT): 23  AM-PAC 6 Clicks Score (OT): 24         Florenitno Baum PTA  9/23/2022

## 2022-09-26 NOTE — THERAPY DISCHARGE NOTE
SNF - Occupational Therapy Discharge  [facility]    Patient Name: Hai Gold  : 1955    MRN: [id number]                              Today's Date: 2022       Admit Date: 2022    Visit Dx:     ICD-10-CM ICD-9-CM   1. Decreased activities of daily living (ADL)  Z78.9 V49.89   2. Dysphagia, oropharyngeal  R13.12 787.22   3. Difficulty walking  R26.2 719.7   4. Generalized weakness  R53.1 780.79     Patient Active Problem List   Diagnosis   • Pneumonia   • Iron deficiency anemia   • Anemia   • CHF (congestive heart failure) (HCC)   • Acute systolic heart failure (CMS/HCC)   • Cytokine release syndrome, grade 1   • Cytokine release syndrome, grade 2   • Expressive aphasia   • Dysarthria   • Generalized weakness     Past Medical History:   Diagnosis Date   • Arthritis    • CHF (congestive heart failure) (HCC)    • Hypertension      Past Surgical History:   Procedure Laterality Date   • COLONOSCOPY     • COLONOSCOPY N/A 2022    Procedure: COLONOSCOPY WITH POLYPECTOMIES;  Surgeon: Min Campbell MD;  Location: [facility] ENDOSCOPY;  Service: Gastroenterology;  Laterality: N/A;  COLON POLYPS, DIVERTICULOSIS   • ENDOSCOPY N/A 2022    Procedure: ESOPHAGOGASTRODUODENOSCOPY WITH BIOPSIES;  Surgeon: Min Campbell MD;  Location: [facility] ENDOSCOPY;  Service: Gastroenterology;  Laterality: N/A;  HIATAL HERNIA   • HEMORRHOIDECTOMY        General Information    No documentation.                Mobility/ADL's    No documentation.                Obj/Interventions    No documentation.                Goals/Plan     Row Name 22          Transfer Goal 1 (OT)    Activity/Assistive Device (Transfer Goal 1, OT) transfers, all  -AV     [place] Level/Cues Needed (Transfer Goal 1, OT) modified independence  -AV     Time Frame (Transfer Goal 1, OT) long term goal (LTG);30 days  -AV     Progress/Outcome (Transfer Goal 1, OT) goal met  -AV     Row Name 22          Bathing Goal  1 (OT)    Activity/Device (Bathing Goal 1, OT) bathing skills, all;tub bench  -AV     [place] Level/Cues Needed (Bathing Goal 1, OT) modified independence  -AV     Time Frame (Bathing Goal 1, OT) long term goal (LTG);30 days  -AV     Progress/Outcomes (Bathing Goal 1, OT) goal met  -AV     Row Name 09/26/22 0812          Dressing Goal 1 (OT)    Activity/Device (Dressing Goal 1, OT) dressing skills, all  -AV     [place]/Cues Needed (Dressing Goal 1, OT) modified independence  -AV     Time Frame (Dressing Goal 1, OT) long term goal (LTG);30 days  -AV     Progress/Outcome (Dressing Goal 1, OT) goal met  -AV     Row Name 09/26/22 0812          Toileting Goal 1 (OT)    Activity/Device (Toileting Goal 1, OT) toileting skills, all;raised toilet seat  -AV     [place] Level/Cues Needed (Toileting Goal 1, OT) modified independence  -AV     Time Frame (Toileting Goal 1, OT) long term goal (LTG);30 days  -AV     Progress/Outcome (Toileting Goal 1, OT) goal met  -AV     Row Name 09/26/22 0812          Grooming Goal 1 (OT)    Activity/Device (Grooming Goal 1, OT) grooming skills, all  -AV     [place] (Grooming Goal 1, OT) modified independence  -AV     Time Frame (Grooming Goal 1, OT) long term goal (LTG);30 days  -AV     Progress/Outcome (Grooming Goal 1, OT) goal met  -AV     Row Name 09/26/22 0812          Self-Feeding Goal 1 (OT)    Activity/Device (Self-Feeding Goal 1, OT) self-feeding skills, all  -AV     [place] Level/Cues Needed (Self-Feeding Goal 1, OT) modified independence  -AV     Time Frame (Self-Feeding Goal 1, OT) long term goal (LTG);10 days  -AV     Progress/Outcomes (Self-Feeding Goal 1, OT) goal met  -AV     Row Name 09/26/22 0812          Strength Goal 1 (OT)    Strength Goal 1 (OT) Patient will demonstrate 5/5 bilateral upper extremities to increase ADL and transfer independence.  -AV     Time Frame (Strength Goal 1, OT) long term goal (LTG);30 days  -AV     Progress/Outcome (Strength  Goal 1, OT) goal met  -AV     Row Name 09/26/22 0812          Problem Specific Goal 1 (OT)    Problem Specific Goal 1 (OT) Patient will demonstrate good endurance/activity tolerance needed to support ADLs.  -AV     Time Frame (Problem Specific Goal 1, OT) long term goal (LTG);30 days  -AV     Progress/Outcome (Problem Specific Goal 1, OT) goal met  -AV           User Key  (r) = Recorded By, (t) = Taken By, (c) = Cosigned By    Initials Name Provider Type    Tray Parker OT Occupational Therapist               Clinical Impression    No documentation.                Outcome Measures    No documentation.               Section G  Mobility  Dressing - self performance: independent  Dressing support/assistance: Setup help only  Eating - self performance: independent  Eating support/assistance: No setup or physical help  Toileting - self performance: independent  Toileting support/assistance: No setup or physical help  Personal hygiene - self performance: independent  Personal hygiene support/assistance: Setup help only  Bathing  Bathing - self performance: Independent  Bathing support/assistance: Setup only     Range of Motion  Upper Extremity: No impairment  Section GG                         Occupational Therapy Education                 Title: PT OT SLP Therapies (In Progress)     Topic: Occupational Therapy (In Progress)     Point: ADL training (Done)     Description:   Instruct learner(s) on proper safety adaptation and remediation techniques during self care or transfers.   Instruct in proper use of assistive devices.              Learning Progress Summary           Patient Acceptance, E, VU by AV at 9/15/2022 1114    Comment: Need for staff assistance for all standing ADLs/transfers  Safe transfer techniques  Safe positioning during ADLs    Acceptance, E, VU by AV at 9/14/2022 1110    Comment: Need for staff assistance for all standing ADLs/transfers  Safe transfer techniques  Safe positioning ADLs     Acceptance, E, VU by AV at 9/13/2022 1050    Comment: Need for staff assistance for all standing ADLs/transfers  Safe positioning ADLs  Safe transfer techniques                   Point: Home exercise program (Not Started)     Description:   Instruct learner(s) on appropriate technique for monitoring, assisting and/or progressing therapeutic exercises/activities.              Learner Progress:  Not documented in this visit.          Point: Precautions (Done)     Description:   Instruct learner(s) on prescribed precautions during self-care and functional transfers.              Learning Progress Summary           Patient Acceptance, E, VU by AV at 9/14/2022 1110    Comment: Need for staff assistance for all standing ADLs/transfers  Safe transfer techniques  Safe positioning ADLs    Acceptance, E, VU by AV at 9/13/2022 1050    Comment: Need for staff assistance for all standing ADLs/transfers  Safe positioning ADLs  Safe transfer techniques                   Point: Body mechanics (Not Started)     Description:   Instruct learner(s) on proper positioning and spine alignment during self-care, functional mobility activities and/or exercises.              Learner Progress:  Not documented in this visit.                      User Key     Initials Effective Dates Name Provider Type Discipline     06/16/21 -  Tray Marcelo OT Occupational Therapist OT              OT Recommendation and Plan  Retired Outcome Summary/Treatment Plan (OT)  Anticipated Discharge Disposition (OT): home with home health  Planned Therapy Interventions (OT): activity tolerance training, BADL retraining, functional balance retraining, occupation/activity based interventions, patient/caregiver education/training, transfer/mobility retraining, strengthening exercise  Therapy Frequency (OT): 5 times/wk  Plan of Care Review  Plan of Care Reviewed With: patient  Progress: improving  Outcome Evaluation: Patient independent with basic ADLs/transfers with  set up due to hospital setting.  Anticipating discharge home tomorrow.  Tub transfer bench, rolling walker recommended at discharge for optimal safety.  Plan of Care Reviewed With: patient  Outcome Evaluation: Patient independent with basic ADLs/transfers with set up due to hospital setting.  Anticipating discharge home tomorrow.  Tub transfer bench, rolling walker recommended at discharge for optimal safety.     Time Calculation:              Tray Marcelo, OT  9/26/2022

## 2022-10-21 ENCOUNTER — HOSPITAL ENCOUNTER (EMERGENCY)
Facility: HOSPITAL | Age: 67
Discharge: HOME | End: 2022-10-22
Attending: EMERGENCY MEDICINE | Admitting: EMERGENCY MEDICINE

## 2022-10-21 ENCOUNTER — APPOINTMENT (OUTPATIENT)
Dept: GENERAL RADIOLOGY | Facility: HOSPITAL | Age: 67
End: 2022-10-21

## 2022-10-21 VITALS
SYSTOLIC BLOOD PRESSURE: 124 MMHG | BODY MASS INDEX: 21.11 KG/M2 | HEIGHT: 67 IN | TEMPERATURE: 98 F | DIASTOLIC BLOOD PRESSURE: 87 MMHG | OXYGEN SATURATION: 94 % | HEART RATE: 94 BPM | WEIGHT: 134.48 LBS | RESPIRATION RATE: 18 BRPM

## 2022-10-21 DIAGNOSIS — I50.9 ACUTE ON CHRONIC CONGESTIVE HEART FAILURE, UNSPECIFIED HEART FAILURE TYPE: Primary | ICD-10-CM

## 2022-10-21 LAB
ALBUMIN SERPL-MCNC: 3.7 G/DL (ref 3.5–5.2)
ALBUMIN/GLOB SERPL: 0.7 G/DL
ALP SERPL-CCNC: 100 U/L (ref 39–117)
ALT SERPL W P-5'-P-CCNC: 9 U/L (ref 1–41)
ANION GAP SERPL CALCULATED.3IONS-SCNC: 10.9 MMOL/L (ref 5–15)
AST SERPL-CCNC: 14 U/L (ref 1–40)
BASOPHILS # BLD AUTO: 0.04 10*3/MM3 (ref 0–0.2)
BASOPHILS NFR BLD AUTO: 0.5 % (ref 0–1.5)
BILIRUB SERPL-MCNC: 0.8 MG/DL (ref 0–1.2)
BUN SERPL-MCNC: 10 MG/DL (ref 8–23)
BUN/CREAT SERPL: 11 (ref 7–25)
CALCIUM SPEC-SCNC: 9.1 MG/DL (ref 8.6–10.5)
CHLORIDE SERPL-SCNC: 101 MMOL/L (ref 98–107)
CO2 SERPL-SCNC: 24.1 MMOL/L (ref 22–29)
CREAT SERPL-MCNC: 0.91 MG/DL (ref 0.76–1.27)
DEPRECATED RDW RBC AUTO: 48.5 FL (ref 37–54)
EGFRCR SERPLBLD CKD-EPI 2021: 92.4 ML/MIN/1.73
EOSINOPHIL # BLD AUTO: 0.12 10*3/MM3 (ref 0–0.4)
EOSINOPHIL NFR BLD AUTO: 1.5 % (ref 0.3–6.2)
ERYTHROCYTE [DISTWIDTH] IN BLOOD BY AUTOMATED COUNT: 14.9 % (ref 12.3–15.4)
GLOBULIN UR ELPH-MCNC: 5 GM/DL
GLUCOSE SERPL-MCNC: 101 MG/DL (ref 65–99)
HCT VFR BLD AUTO: 33.4 % (ref 37.5–51)
HGB BLD-MCNC: 11.3 G/DL (ref 13–17.7)
HOLD SPECIMEN: NORMAL
HOLD SPECIMEN: NORMAL
IMM GRANULOCYTES # BLD AUTO: 0.02 10*3/MM3 (ref 0–0.05)
IMM GRANULOCYTES NFR BLD AUTO: 0.2 % (ref 0–0.5)
LYMPHOCYTES # BLD AUTO: 2.63 10*3/MM3 (ref 0.7–3.1)
LYMPHOCYTES NFR BLD AUTO: 32.1 % (ref 19.6–45.3)
MCH RBC QN AUTO: 30.5 PG (ref 26.6–33)
MCHC RBC AUTO-ENTMCNC: 33.8 G/DL (ref 31.5–35.7)
MCV RBC AUTO: 90 FL (ref 79–97)
MONOCYTES # BLD AUTO: 0.66 10*3/MM3 (ref 0.1–0.9)
MONOCYTES NFR BLD AUTO: 8 % (ref 5–12)
NEUTROPHILS NFR BLD AUTO: 4.73 10*3/MM3 (ref 1.7–7)
NEUTROPHILS NFR BLD AUTO: 57.7 % (ref 42.7–76)
NRBC BLD AUTO-RTO: 0 /100 WBC (ref 0–0.2)
NT-PROBNP SERPL-MCNC: ABNORMAL PG/ML (ref 0–900)
PLATELET # BLD AUTO: 349 10*3/MM3 (ref 140–450)
PMV BLD AUTO: 9.6 FL (ref 6–12)
POTASSIUM SERPL-SCNC: 4.3 MMOL/L (ref 3.5–5.2)
PROT SERPL-MCNC: 8.7 G/DL (ref 6–8.5)
RBC # BLD AUTO: 3.71 10*6/MM3 (ref 4.14–5.8)
SODIUM SERPL-SCNC: 136 MMOL/L (ref 136–145)
TROPONIN T SERPL-MCNC: 0.01 NG/ML (ref 0–0.03)
WBC NRBC COR # BLD: 8.2 10*3/MM3 (ref 3.4–10.8)
WHOLE BLOOD HOLD COAG: NORMAL
WHOLE BLOOD HOLD SPECIMEN: NORMAL

## 2022-10-21 PROCEDURE — 36415 COLL VENOUS BLD VENIPUNCTURE: CPT

## 2022-10-21 PROCEDURE — 71045 X-RAY EXAM CHEST 1 VIEW: CPT

## 2022-10-21 PROCEDURE — 85025 COMPLETE CBC W/AUTO DIFF WBC: CPT

## 2022-10-21 PROCEDURE — 84484 ASSAY OF TROPONIN QUANT: CPT

## 2022-10-21 PROCEDURE — 93005 ELECTROCARDIOGRAM TRACING: CPT

## 2022-10-21 PROCEDURE — 25010000002 FUROSEMIDE PER 20 MG: Performed by: EMERGENCY MEDICINE

## 2022-10-21 PROCEDURE — 96374 THER/PROPH/DIAG INJ IV PUSH: CPT

## 2022-10-21 PROCEDURE — 99283 EMERGENCY DEPT VISIT LOW MDM: CPT

## 2022-10-21 PROCEDURE — 93005 ELECTROCARDIOGRAM TRACING: CPT | Performed by: EMERGENCY MEDICINE

## 2022-10-21 PROCEDURE — 83880 ASSAY OF NATRIURETIC PEPTIDE: CPT

## 2022-10-21 PROCEDURE — 93010 ELECTROCARDIOGRAM REPORT: CPT | Performed by: INTERNAL MEDICINE

## 2022-10-21 PROCEDURE — 80053 COMPREHEN METABOLIC PANEL: CPT

## 2022-10-21 RX ORDER — FUROSEMIDE 20 MG/1
20 TABLET ORAL DAILY
Qty: 5 TABLET | Refills: 0 | Status: SHIPPED | OUTPATIENT
Start: 2022-10-21 | End: 2022-10-26

## 2022-10-21 RX ORDER — SODIUM CHLORIDE 0.9 % (FLUSH) 0.9 %
10 SYRINGE (ML) INJECTION AS NEEDED
Status: DISCONTINUED | OUTPATIENT
Start: 2022-10-21 | End: 2022-10-22 | Stop reason: HOSPADM

## 2022-10-21 RX ORDER — FUROSEMIDE 10 MG/ML
40 INJECTION INTRAMUSCULAR; INTRAVENOUS ONCE
Status: COMPLETED | OUTPATIENT
Start: 2022-10-21 | End: 2022-10-21

## 2022-10-21 RX ADMIN — FUROSEMIDE 40 MG: 10 INJECTION, SOLUTION INTRAMUSCULAR; INTRAVENOUS at 22:10

## 2022-10-21 RX ADMIN — NITROGLYCERIN 0.5 INCH: 20 OINTMENT TOPICAL at 22:11

## 2022-10-22 NOTE — ED PROVIDER NOTES
"Time: 9:49 PM EDT  Chief Complaint: shortness of breath  Chief Complaint   Patient presents with   • Shortness of Breath       History of Present Illness:  Patient is a 67 y.o. year old male who presents to the emergency department with shortness of breath. Pt reports he was sent from  for difficulty breathing. He states he has been experiencing worsening shortness of breath for the past two days, and he states the \"fluid is putting pressure\" on his lungs. He also reports he experiences chest pain only with cough and deep inspiration. He admits to nausea earlier today when coughing, and he denies worsening cough, fever, or vomiting. Pt reports he was taking a water pill but his doctor changed replaced the medication last month. He states he is unsure of the reason the medication was switched, and he state she is unsure of what medication the water pill was replaced by. He admits to hx of CHF, and he denies using oxygen or CPAP at home.        History provided by:  Patient   used: No            Patient Care Team  Primary Care Provider: Shelly Mejia MD    Past Medical History:     Allergies   Allergen Reactions   • Duloxetine Other (See Comments)     Shaking and slurred speech   • Levofloxacin Angioedema   • Penicillins Hives     Past Medical History:   Diagnosis Date   • Arthritis    • CHF (congestive heart failure) (HCC)    • Hypertension      Past Surgical History:   Procedure Laterality Date   • COLONOSCOPY     • COLONOSCOPY N/A 6/27/2022    Procedure: COLONOSCOPY WITH POLYPECTOMIES;  Surgeon: Min Campbell MD;  Location: [facility] ENDOSCOPY;  Service: Gastroenterology;  Laterality: N/A;  COLON POLYPS, DIVERTICULOSIS   • ENDOSCOPY N/A 03/29/2022    Procedure: ESOPHAGOGASTRODUODENOSCOPY WITH BIOPSIES;  Surgeon: Min Campbell MD;  Location: [facility] ENDOSCOPY;  Service: Gastroenterology;  Laterality: N/A;  HIATAL HERNIA   • HEMORRHOIDECTOMY       History reviewed. No pertinent " family history.    Home Medications:  Prior to Admission medications    Medication Sig Start Date End Date Taking? Authorizing Provider   ALBUTEROL IN 2 puff(s)    Kat Aguilar MD   apixaban (ELIQUIS) 2.5 MG tablet tablet Take 2.5 mg by mouth 2 (Two) Times a Day.    Kat Aguilar MD   aspirin 81 MG EC tablet Take 81 mg by mouth Daily.    Kat Aguilar MD   docusate sodium 100 MG capsule Take 1 capsule by mouth 2 (Two) Times a Day for 30 days. 9/23/22 10/23/22  Lazaro May MD   Etanercept (Enbrel Mini) 50 MG/ML solution cartridge Inject 50 mg under the skin into the appropriate area as directed Every 7 (Seven) Days. Mon    Kat Aguilar MD   folic acid (FOLVITE) 1 MG tablet Take 1 mg by mouth Daily. 21   Kat Aguilar MD   linaclotide (LINZESS) 145 MCG capsule capsule Take 145 mcg by mouth Every Morning Before Breakfast.    Kat Aguilar MD   methotrexate 2.5 MG tablet Take 12.5 mg by mouth 1 (One) Time Per Week. Friday (5 tabs)    Kat Aguilar MD   metoprolol succinate XL (TOPROL-XL) 25 MG 24 hr tablet Take 1 tablet by mouth Daily for 30 days. 9/23/22 10/23/22  Lazaro May MD   ondansetron (Zofran) 4 MG tablet 1 tab(s)    Kat Aguilar MD   oxyCODONE-acetaminophen (PERCOCET) 7.5-325 MG per tablet Take 1 tablet by mouth 2 (Two) Times a Day As Needed. 22   Kat Aguilar MD   pantoprazole (PROTONIX) 20 MG EC tablet Take 20 mg by mouth Daily.    Kat Aguilar MD   Testosterone Enanthate 200 MG/ML solution Inject 200 mg into the appropriate muscle as directed by prescriber Every 14 (Fourteen) Days. Wed    Kat Aguilar MD        Social History:   Social History     Tobacco Use   • Smoking status: Former     Types: Cigarettes     Quit date:      Years since quittin.8   • Smokeless tobacco: Never   Substance Use Topics   • Alcohol use: Not Currently   • Drug use: Never         Review of Systems:  Review of  "Systems   Constitutional: Negative for chills and fever.   HENT: Negative for congestion, rhinorrhea and sore throat.    Eyes: Negative for photophobia.   Respiratory: Positive for shortness of breath. Negative for apnea, cough (worsening) and chest tightness.    Cardiovascular: Positive for chest pain (only with cough or deep inspiration). Negative for palpitations.   Gastrointestinal: Positive for nausea (when coughing). Negative for abdominal pain, diarrhea and vomiting.   Endocrine: Negative.    Genitourinary: Negative for difficulty urinating and dysuria.   Musculoskeletal: Negative for back pain, joint swelling and myalgias.   Skin: Negative for color change and wound.   Allergic/Immunologic: Negative.    Neurological: Negative for seizures and headaches.   Psychiatric/Behavioral: Negative.    All other systems reviewed and are negative.       Physical Exam:  /87 (BP Location: Right arm, Patient Position: Sitting)   Pulse 86   Temp 98 °F (36.7 °C) (Oral)   Resp 18   Ht 170.2 cm (67\")   Wt 61 kg (134 lb 7.7 oz)   SpO2 98%   BMI 21.06 kg/m²     Physical Exam  Vitals and nursing note reviewed.   Constitutional:       General: He is awake.      Appearance: Normal appearance.   HENT:      Head: Normocephalic and atraumatic.      Nose: Nose normal.      Mouth/Throat:      Mouth: Mucous membranes are moist.   Eyes:      Extraocular Movements: Extraocular movements intact.      Pupils: Pupils are equal, round, and reactive to light.   Cardiovascular:      Rate and Rhythm: Normal rate and regular rhythm.      Heart sounds: Normal heart sounds.   Pulmonary:      Effort: Pulmonary effort is normal. No respiratory distress.      Breath sounds: Normal breath sounds. No decreased breath sounds, wheezing, rhonchi or rales.      Comments: Oxygen saturation is 100% on room air  Abdominal:      General: Bowel sounds are normal.      Palpations: Abdomen is soft.      Tenderness: There is no abdominal tenderness. " There is no guarding or rebound.      Comments: No rigidity   Musculoskeletal:         General: No tenderness. Normal range of motion.      Cervical back: Normal range of motion and neck supple.      Right lower leg: No edema.      Left lower leg: No edema.   Skin:     General: Skin is warm and dry.      Coloration: Skin is not jaundiced.   Neurological:      General: No focal deficit present.      Mental Status: He is alert and oriented to person, place, and time. Mental status is at baseline.      Sensory: Sensation is intact.      Motor: Motor function is intact.      Coordination: Coordination is intact.   Psychiatric:         Attention and Perception: Attention and perception normal.         Mood and Affect: Mood and affect normal.         Speech: Speech normal.         Behavior: Behavior normal.         Judgment: Judgment normal.                Medications in the Emergency Department:  Medications   sodium chloride 0.9 % flush 10 mL (has no administration in time range)   furosemide (LASIX) injection 40 mg (40 mg Intravenous Given 10/21/22 2210)   nitroglycerin (NITROSTAT) ointment 0.5 inch (0.5 inches Topical Given 10/21/22 2211)        Labs  Lab Results (last 24 hours)     Procedure Component Value Units Date/Time    POC Influenza A/B [[id number]]  (Normal) Resulted: 10/21/22 1535    Specimen: Swab Updated: 10/21/22 1536     Rapid Influenza A Ag Negative     Rapid Influenza B Ag Negative     Internal Control Passed     Lot Number [id number]     Expiration Date 07/13/2024    POCT SARS-CoV-2 Antigen ANNA   ([facility]) [[id number]]  (Normal) Resulted: 10/21/22 1536    Specimen: Swab Updated: 10/21/22 1536     SARS Antigen Not Detected     Internal Control Passed     Lot Number [id number]     Expiration Date 06/30/2023    CBC & Differential [[id number]]  (Abnormal) Collected: 10/21/22 1657    Specimen: Blood Updated: 10/21/22 1726    Narrative:      The following orders were created for panel order CBC &  Differential.  Procedure                               Abnormality         Status                     ---------                               -----------         ------                     CBC Auto Differential[[id number]]        Abnormal            Final result                 Please view results for these tests on the individual orders.    Comprehensive Metabolic Panel [[id number]]  (Abnormal) Collected: 10/21/22 1657    Specimen: Blood Updated: 10/21/22 1755     Glucose 101 mg/dL      BUN 10 mg/dL      Creatinine 0.91 mg/dL      Sodium 136 mmol/L      Potassium 4.3 mmol/L      Chloride 101 mmol/L      CO2 24.1 mmol/L      Calcium 9.1 mg/dL      Total Protein 8.7 g/dL      Albumin 3.70 g/dL      ALT (SGPT) 9 U/L      AST (SGOT) 14 U/L      Alkaline Phosphatase 100 U/L      Total Bilirubin 0.8 mg/dL      Globulin 5.0 gm/dL      A/G Ratio 0.7 g/dL      BUN/Creatinine Ratio 11.0     Anion Gap 10.9 mmol/L      eGFR 92.4 mL/min/1.73      Comment: National Kidney Foundation and American Society of Nephrology (ASN) Task Force recommended calculation based on the Chronic Kidney Disease Epidemiology Collaboration (CKD-EPI) equation refit without adjustment for race.       Narrative:      GFR Normal >60  Chronic Kidney Disease <60  Kidney Failure <15      BNP [[id number]]  (Abnormal) Collected: 10/21/22 1657    Specimen: Blood Updated: 10/21/22 1748     proBNP 10,200.0 pg/mL     Narrative:      Among patients with dyspnea, NT-proBNP is highly sensitive for the detection of acute congestive heart failure. In addition NT-proBNP of <300 pg/ml effectively rules out acute congestive heart failure with 99% negative predictive value.    Results may be falsely decreased if patient taking Biotin.      Troponin [[id number]]  (Normal) Collected: 10/21/22 1657    Specimen: Blood Updated: 10/21/22 1755     Troponin T 0.012 ng/mL     Narrative:      Troponin T Reference Range:  <= 0.03 ng/mL-   Negative for AMI  >0.03 ng/mL-     Abnormal for  myocardial necrosis.  Clinicians would have to utilize clinical acumen, EKG, Troponin and serial changes to determine if it is an Acute Myocardial Infarction or myocardial injury due to an underlying chronic condition.       Results may be falsely decreased if patient taking Biotin.      CBC Auto Differential [[id number]]  (Abnormal) Collected: 10/21/22 1657    Specimen: Blood Updated: 10/21/22 1726     WBC 8.20 10*3/mm3      RBC 3.71 10*6/mm3      Hemoglobin 11.3 g/dL      Hematocrit 33.4 %      MCV 90.0 fL      MCH 30.5 pg      MCHC 33.8 g/dL      RDW 14.9 %      RDW-SD 48.5 fl      MPV 9.6 fL      Platelets 349 10*3/mm3      Neutrophil % 57.7 %      Lymphocyte % 32.1 %      Monocyte % 8.0 %      Eosinophil % 1.5 %      Basophil % 0.5 %      Immature Grans % 0.2 %      Neutrophils, Absolute 4.73 10*3/mm3      Lymphocytes, Absolute 2.63 10*3/mm3      Monocytes, Absolute 0.66 10*3/mm3      Eosinophils, Absolute 0.12 10*3/mm3      Basophils, Absolute 0.04 10*3/mm3      Immature Grans, Absolute 0.02 10*3/mm3      nRBC 0.0 /100 WBC            Imaging:  XR Chest 2 View    Result Date: 10/21/2022  PROCEDURE: XR CHEST 2 VW  COMPARISON: [facility], , CHEST AP/PA 1 VIEW, 10/21/2020, 20:14.  INDICATIONS: Cough and shortness of breath  FINDINGS:  The heart is enlarged.  There is mild prominence of the pulmonary vasculature consistent with pulmonary edema.  There are bilateral pleural effusions right greater than left.  There is bilateral basilar atelectasis.       Cardiomegaly.  Mild diffuse pulmonary edema.  Bilateral pleural fluid collections greater on the right than the left with associated basilar compressive atelectasis.     RADHA BORRERO MD       Electronically Signed and Approved By: RADHA BORRERO MD on 10/21/2022 at 15:27             XR Chest 1 View    Result Date: 10/21/2022  PROCEDURE: XR CHEST 1 VW  COMPARISON: [facility], , XR CHEST 2 VW, 10/21/2022, 14:56.  INDICATIONS: SOA  FINDINGS:  A small  right pleural effusion is unchanged.  A trace left pleural effusion is suspected.  Mild cardiomegaly is noted.  Increased interstitial markings are noted in the mid and lower lung fields bilaterally.        1. Mild-to-moderate congestive heart failure, similar to the previous exam.       Jason Parnell M.D.       Electronically Signed and Approved By: Jason Parnell M.D. on 10/21/2022 at 18:51               Procedures:  Procedures    Progress  ED Course as of 10/21/22 2341   Fri Oct 21, 2022   2114   --- PROVIDER IN TRIAGE NOTE ---    Patient was evaluated in triage by Nba berman PA-C.  In short, the pt presented with shortness of breath. Gradual onset x 2 months. Worse x 2 days. Worse at night. Not currently on laxis.  Orders were written and the patient was placed in room, currently awaiting disposition.  [KM]   7976 EKG interpretation: Normal sinus rhythm, heart rate 89, normal ID interval, borderline QRS interval prolongation with incomplete left bundle branch block, left axis deviation, nonspecific ST segment changes with no acute ischemia.  EKG is unchanged from prior study dated 9/14/2022. [RP]      ED Course User Index  [KM] Nba Marin PA-C  [RP] Myron Steen MD                            The patient was initially evaluated in the triage area where orders were placed. The patient was later dispositioned by Myron Steen MD.      The patient was advised to stay for completion of workup which includes but is not limited to communication of labs and radiological results, reassessment and plan. The patient was advised that leaving prior to disposition by a provider could result in critical findings that are not communicated to the patient.     Medical Decision Making:  MDM  Number of Diagnoses or Management Options  Acute on chronic congestive heart failure, unspecified heart failure type (HCC): established and worsening     Amount and/or Complexity of Data Reviewed  Decide to obtain  previous medical records or to obtain history from someone other than the patient: yes  Review and summarize past medical records: yes  Independent visualization of images, tracings, or specimens: yes    Risk of Complications, Morbidity, and/or Mortality  Presenting problems: moderate  Management options: low    Patient Progress  Patient progress: improved           The following orders were placed after triage and evaluation:  Orders Placed This Encounter   Procedures   • XR Chest 1 View   • [place] Draw   • Comprehensive Metabolic Panel   • BNP   • Troponin   • CBC Auto Differential   • NPO Diet NPO Type: Strict NPO   • Undress & Gown   • Cardiac Monitoring   • Continuous Pulse Oximetry   • Vital Signs   • Oxygen Therapy- Nasal Cannula; 2 LPM; Titrate for SPO2: equal to or greater than, 92%   • ECG 12 Lead   • Insert Peripheral IV   • CBC & Differential   • Green Top (Gel)   • Lavender Top   • Gold Top - SST   • Light Blue Top       Final diagnoses:   Acute on chronic congestive heart failure, unspecified heart failure type (HCC)          Disposition:  ED Disposition     ED Disposition   Discharge    Condition   Stable    Comment   --             This medical record created using voice recognition software.      Documentation assistance provided by Tank Salcedo acting as scribe for Myron Steen MD. Information recorded by the scribe was done at my direction and has been verified and validated by me.       Tank Salcedo  10/21/22 [id number]       Myron Steen MD  10/21/22 [id number]

## 2022-10-22 NOTE — DISCHARGE INSTRUCTIONS
Return to the emergency department immediately for worsening difficulty breathing or chest pain.  Talk to your family doctor Monday to discuss need for medication adjustments regarding treatment of your congestive heart failure.

## 2022-10-28 LAB — QT INTERVAL: 439 MS

## 2023-01-18 ENCOUNTER — HOSPITAL ENCOUNTER (EMERGENCY)
Facility: HOSPITAL | Age: 68
Discharge: HOME | End: 2023-01-18
Attending: EMERGENCY MEDICINE | Admitting: EMERGENCY MEDICINE
Payer: MEDICARE

## 2023-01-18 ENCOUNTER — APPOINTMENT (OUTPATIENT)
Dept: GENERAL RADIOLOGY | Facility: HOSPITAL | Age: 68
End: 2023-01-18
Payer: MEDICARE

## 2023-01-18 VITALS
RESPIRATION RATE: 18 BRPM | OXYGEN SATURATION: 100 % | BODY MASS INDEX: 20.15 KG/M2 | HEIGHT: 68 IN | DIASTOLIC BLOOD PRESSURE: 73 MMHG | TEMPERATURE: 98.1 F | HEART RATE: 79 BPM | WEIGHT: 132.94 LBS | SYSTOLIC BLOOD PRESSURE: 118 MMHG

## 2023-01-18 DIAGNOSIS — F41.9 ANXIETY: ICD-10-CM

## 2023-01-18 DIAGNOSIS — I48.91 ATRIAL FIBRILLATION, UNSPECIFIED TYPE: ICD-10-CM

## 2023-01-18 DIAGNOSIS — I50.9 CONGESTIVE HEART FAILURE, UNSPECIFIED HF CHRONICITY, UNSPECIFIED HEART FAILURE TYPE: Primary | ICD-10-CM

## 2023-01-18 LAB
ALBUMIN SERPL-MCNC: 4.2 G/DL (ref 3.5–5.2)
ALBUMIN/GLOB SERPL: 0.9 G/DL
ALP SERPL-CCNC: 93 U/L (ref 39–117)
ALT SERPL W P-5'-P-CCNC: 10 U/L (ref 1–41)
ANION GAP SERPL CALCULATED.3IONS-SCNC: 9.7 MMOL/L (ref 5–15)
AST SERPL-CCNC: 15 U/L (ref 1–40)
BASOPHILS # BLD AUTO: 0.03 10*3/MM3 (ref 0–0.2)
BASOPHILS NFR BLD AUTO: 0.4 % (ref 0–1.5)
BILIRUB SERPL-MCNC: 0.3 MG/DL (ref 0–1.2)
BUN SERPL-MCNC: 13 MG/DL (ref 8–23)
BUN/CREAT SERPL: 12.5 (ref 7–25)
CALCIUM SPEC-SCNC: 9.3 MG/DL (ref 8.6–10.5)
CHLORIDE SERPL-SCNC: 99 MMOL/L (ref 98–107)
CO2 SERPL-SCNC: 27.3 MMOL/L (ref 22–29)
CREAT SERPL-MCNC: 1.04 MG/DL (ref 0.76–1.27)
DEPRECATED RDW RBC AUTO: 48.1 FL (ref 37–54)
EGFRCR SERPLBLD CKD-EPI 2021: 78.2 ML/MIN/1.73
EOSINOPHIL # BLD AUTO: 0.04 10*3/MM3 (ref 0–0.4)
EOSINOPHIL NFR BLD AUTO: 0.6 % (ref 0.3–6.2)
ERYTHROCYTE [DISTWIDTH] IN BLOOD BY AUTOMATED COUNT: 14.2 % (ref 12.3–15.4)
GLOBULIN UR ELPH-MCNC: 4.6 GM/DL
GLUCOSE SERPL-MCNC: 116 MG/DL (ref 65–99)
HCT VFR BLD AUTO: 35 % (ref 37.5–51)
HGB BLD-MCNC: 11.9 G/DL (ref 13–17.7)
HOLD SPECIMEN: NORMAL
HOLD SPECIMEN: NORMAL
IMM GRANULOCYTES # BLD AUTO: 0.03 10*3/MM3 (ref 0–0.05)
IMM GRANULOCYTES NFR BLD AUTO: 0.4 % (ref 0–0.5)
LYMPHOCYTES # BLD AUTO: 1.38 10*3/MM3 (ref 0.7–3.1)
LYMPHOCYTES NFR BLD AUTO: 20.1 % (ref 19.6–45.3)
MCH RBC QN AUTO: 31.3 PG (ref 26.6–33)
MCHC RBC AUTO-ENTMCNC: 34 G/DL (ref 31.5–35.7)
MCV RBC AUTO: 92.1 FL (ref 79–97)
MONOCYTES # BLD AUTO: 0.43 10*3/MM3 (ref 0.1–0.9)
MONOCYTES NFR BLD AUTO: 6.3 % (ref 5–12)
NEUTROPHILS NFR BLD AUTO: 4.97 10*3/MM3 (ref 1.7–7)
NEUTROPHILS NFR BLD AUTO: 72.2 % (ref 42.7–76)
NRBC BLD AUTO-RTO: 0 /100 WBC (ref 0–0.2)
NT-PROBNP SERPL-MCNC: 4799 PG/ML (ref 0–900)
PLATELET # BLD AUTO: 284 10*3/MM3 (ref 140–450)
PMV BLD AUTO: 8.7 FL (ref 6–12)
POTASSIUM SERPL-SCNC: 3.9 MMOL/L (ref 3.5–5.2)
PROT SERPL-MCNC: 8.8 G/DL (ref 6–8.5)
QT INTERVAL: 417 MS
RBC # BLD AUTO: 3.8 10*6/MM3 (ref 4.14–5.8)
SODIUM SERPL-SCNC: 136 MMOL/L (ref 136–145)
TROPONIN T SERPL-MCNC: <0.01 NG/ML (ref 0–0.03)
WBC NRBC COR # BLD: 6.88 10*3/MM3 (ref 3.4–10.8)
WHOLE BLOOD HOLD COAG: NORMAL
WHOLE BLOOD HOLD SPECIMEN: NORMAL

## 2023-01-18 PROCEDURE — 84484 ASSAY OF TROPONIN QUANT: CPT

## 2023-01-18 PROCEDURE — 93010 ELECTROCARDIOGRAM REPORT: CPT | Performed by: INTERNAL MEDICINE

## 2023-01-18 PROCEDURE — 93005 ELECTROCARDIOGRAM TRACING: CPT

## 2023-01-18 PROCEDURE — 99284 EMERGENCY DEPT VISIT MOD MDM: CPT

## 2023-01-18 PROCEDURE — 25010000002 FUROSEMIDE PER 20 MG: Performed by: EMERGENCY MEDICINE

## 2023-01-18 PROCEDURE — 85025 COMPLETE CBC W/AUTO DIFF WBC: CPT

## 2023-01-18 PROCEDURE — 96374 THER/PROPH/DIAG INJ IV PUSH: CPT

## 2023-01-18 PROCEDURE — 99283 EMERGENCY DEPT VISIT LOW MDM: CPT

## 2023-01-18 PROCEDURE — 36415 COLL VENOUS BLD VENIPUNCTURE: CPT

## 2023-01-18 PROCEDURE — 71045 X-RAY EXAM CHEST 1 VIEW: CPT

## 2023-01-18 PROCEDURE — 80053 COMPREHEN METABOLIC PANEL: CPT

## 2023-01-18 PROCEDURE — 93005 ELECTROCARDIOGRAM TRACING: CPT | Performed by: EMERGENCY MEDICINE

## 2023-01-18 PROCEDURE — 83880 ASSAY OF NATRIURETIC PEPTIDE: CPT

## 2023-01-18 RX ORDER — DIAZEPAM 5 MG/1
5 TABLET ORAL ONCE
Status: COMPLETED | OUTPATIENT
Start: 2023-01-18 | End: 2023-01-18

## 2023-01-18 RX ORDER — SODIUM CHLORIDE 0.9 % (FLUSH) 0.9 %
10 SYRINGE (ML) INJECTION AS NEEDED
Status: DISCONTINUED | OUTPATIENT
Start: 2023-01-18 | End: 2023-01-18 | Stop reason: HOSPADM

## 2023-01-18 RX ORDER — FUROSEMIDE 10 MG/ML
40 INJECTION INTRAMUSCULAR; INTRAVENOUS ONCE
Status: COMPLETED | OUTPATIENT
Start: 2023-01-18 | End: 2023-01-18

## 2023-01-18 RX ADMIN — DIAZEPAM 5 MG: 5 TABLET ORAL at 11:58

## 2023-01-18 RX ADMIN — FUROSEMIDE 40 MG: 10 INJECTION, SOLUTION INTRAMUSCULAR; INTRAVENOUS at 11:58

## 2023-01-18 NOTE — ED PROVIDER NOTES
Time: 11:56 AM EST  Date of encounter:  1/18/2023  Independent Historian/Clinical History and Information was obtained by:   Patient and Family  Chief Complaint: Shortness of breath    History is limited by: N/A    History of Present Illness:  Patient is a 68 y.o. year old male who presents to the emergency department for evaluation of shortness of breath.  He reports a history of congestive heart failure and then when he feels like this has been instructed to come to the emergency department to make sure does not have fluid on his lungs.  He denies chest pain, cough, fever.  He also denies leg swelling.    HPI    Patient Care Team  Primary Care Provider: Shelly Mejia MD    Past Medical History:     Allergies   Allergen Reactions   • Duloxetine Other (See Comments)     Shaking and slurred speech   • Levofloxacin Angioedema   • Penicillins Hives     Past Medical History:   Diagnosis Date   • Arthritis    • CHF (congestive heart failure) (HCC)    • Hypertension      Past Surgical History:   Procedure Laterality Date   • COLONOSCOPY     • COLONOSCOPY N/A 6/27/2022    Procedure: COLONOSCOPY WITH POLYPECTOMIES;  Surgeon: Min Campbell MD;  Location: [facility] ENDOSCOPY;  Service: Gastroenterology;  Laterality: N/A;  COLON POLYPS, DIVERTICULOSIS   • ENDOSCOPY N/A 03/29/2022    Procedure: ESOPHAGOGASTRODUODENOSCOPY WITH BIOPSIES;  Surgeon: Min Campbell MD;  Location: [facility] ENDOSCOPY;  Service: Gastroenterology;  Laterality: N/A;  HIATAL HERNIA   • HEMORRHOIDECTOMY       No family history on file.    Home Medications:  Prior to Admission medications    Medication Sig Start Date End Date Taking? Authorizing Provider   ALBUTEROL IN 2 puff(s)    ProviderKat MD   apixaban (ELIQUIS) 2.5 MG tablet tablet Take 2.5 mg by mouth 2 (Two) Times a Day.    Kat Aguilar MD   aspirin 81 MG EC tablet Take 81 mg by mouth Daily.    Kat Aguilar MD   Etanercept (Enbrel Mini) 50 MG/ML solution  cartridge Inject 50 mg under the skin into the appropriate area as directed Every 7 (Seven) Days. Mon    Kat Aguilar MD   folic acid (FOLVITE) 1 MG tablet Take 1 mg by mouth Daily. 21   Kat Aguilar MD   furosemide (LASIX) 20 MG tablet Take 1 tablet by mouth Daily for 5 days. 10/21/22 10/26/22  Myron Steen MD   linaclotide (LINZESS) 145 MCG capsule capsule Take 145 mcg by mouth Every Morning Before Breakfast.    Kat Aguilar MD   methotrexate 2.5 MG tablet Take 12.5 mg by mouth 1 (One) Time Per Week. Friday (5 tabs)    Kat Aguilar MD   metoprolol succinate XL (TOPROL-XL) 25 MG 24 hr tablet Take 1 tablet by mouth Daily for 30 days. 9/23/22 10/23/22  Lazaro May MD   ondansetron (Zofran) 4 MG tablet 1 tab(s)    Kat Aguilar MD   oxyCODONE-acetaminophen (PERCOCET) 7.5-325 MG per tablet Take 1 tablet by mouth 2 (Two) Times a Day As Needed. 22   Kat Aguilar MD   pantoprazole (PROTONIX) 20 MG EC tablet Take 20 mg by mouth Daily.    Kat Aguilar MD   Testosterone Enanthate 200 MG/ML solution Inject 200 mg into the appropriate muscle as directed by prescriber Every 14 (Fourteen) Days. Wed    Kat Aguilar MD        Social History:   Social History     Tobacco Use   • Smoking status: Former     Types: Cigarettes     Quit date: 1986     Years since quittin.0   • Smokeless tobacco: Never   Substance Use Topics   • Alcohol use: Not Currently   • Drug use: Never         Review of Systems:  Review of Systems   Constitutional: Negative for chills and fever.   HENT: Negative for congestion, rhinorrhea and sore throat.    Eyes: Negative for pain and visual disturbance.   Respiratory: Positive for shortness of breath. Negative for apnea, cough and chest tightness.    Cardiovascular: Negative for chest pain and palpitations.   Gastrointestinal: Negative for abdominal pain, diarrhea, nausea and vomiting.   Genitourinary: Negative for  "difficulty urinating and dysuria.   Musculoskeletal: Negative for joint swelling and myalgias.   Skin: Negative for color change.   Neurological: Negative for seizures and headaches.   Psychiatric/Behavioral: Negative.    All other systems reviewed and are negative.       Physical Exam:  /73   Pulse 79   Temp 98.1 °F (36.7 °C) (Oral)   Resp 18   Ht 172.7 cm (68\")   Wt 60.3 kg (132 lb 15 oz)   SpO2 100%   BMI 20.21 kg/m²     Physical Exam  Vitals and nursing note reviewed.   Constitutional:       General: He is not in acute distress.     Appearance: Normal appearance. He is not toxic-appearing.   HENT:      Head: Normocephalic and atraumatic.      Jaw: There is normal jaw occlusion.   Eyes:      General: Lids are normal.      Extraocular Movements: Extraocular movements intact.      Conjunctiva/sclera: Conjunctivae normal.      Pupils: Pupils are equal, round, and reactive to light.   Cardiovascular:      Rate and Rhythm: Normal rate and regular rhythm.      Pulses: Normal pulses.      Heart sounds: Normal heart sounds.   Pulmonary:      Effort: Pulmonary effort is normal. No respiratory distress.      Breath sounds: Normal breath sounds. No wheezing or rhonchi.   Abdominal:      General: Abdomen is flat.      Palpations: Abdomen is soft.      Tenderness: There is no abdominal tenderness. There is no guarding or rebound.   Musculoskeletal:         General: Normal range of motion.      Cervical back: Normal range of motion and neck supple.      Right lower leg: No edema.      Left lower leg: No edema.   Skin:     General: Skin is warm and dry.   Neurological:      Mental Status: He is alert and oriented to person, place, and time. Mental status is at baseline.   Psychiatric:         Mood and Affect: Mood normal.                  Procedures:  Procedures      Medical Decision Making:      Comorbidities that affect care:    Congestive Heart Failure    External Notes reviewed:    Previous ED Note      The " following orders were placed and all results were independently analyzed by me:  Orders Placed This Encounter   Procedures   • XR Chest 1 View   • Cooper Draw   • Comprehensive Metabolic Panel   • BNP   • Troponin   • CBC Auto Differential   • NPO Diet NPO Type: Strict NPO   • Undress & Gown   • Cardiac Monitoring   • Continuous Pulse Oximetry   • Vital Signs   • Oxygen Therapy- Nasal Cannula; 2 LPM; Titrate for SPO2: equal to or greater than, 92%   • ECG 12 Lead ED Triage Standing Order; SOA   • Insert Peripheral IV   • CBC & Differential   • Green Top (Gel)   • Lavender Top   • Gold Top - SST   • Light Blue Top       Medications Given in the Emergency Department:  Medications   sodium chloride 0.9 % flush 10 mL (has no administration in time range)   furosemide (LASIX) injection 40 mg (40 mg Intravenous Given 1/18/23 1158)   diazePAM (VALIUM) tablet 5 mg (5 mg Oral Given 1/18/23 1158)        ED Course:         Labs:    Lab Results (last 24 hours)     Procedure Component Value Units Date/Time    CBC & Differential [[id number]]  (Abnormal) Collected: 01/18/23 1012    Specimen: Blood Updated: 01/18/23 1019    Narrative:      The following orders were created for panel order CBC & Differential.  Procedure                               Abnormality         Status                     ---------                               -----------         ------                     CBC Auto Differential[[id number]]        Abnormal            Final result                 Please view results for these tests on the individual orders.    Comprehensive Metabolic Panel [[id number]]  (Abnormal) Collected: 01/18/23 1012    Specimen: Blood Updated: 01/18/23 1043     Glucose 116 mg/dL      BUN 13 mg/dL      Creatinine 1.04 mg/dL      Sodium 136 mmol/L      Potassium 3.9 mmol/L      Chloride 99 mmol/L      CO2 27.3 mmol/L      Calcium 9.3 mg/dL      Total Protein 8.8 g/dL      Albumin 4.2 g/dL      ALT (SGPT) 10 U/L      AST (SGOT) 15 U/L       Alkaline Phosphatase 93 U/L      Total Bilirubin 0.3 mg/dL      Globulin 4.6 gm/dL      A/G Ratio 0.9 g/dL      BUN/Creatinine Ratio 12.5     Anion Gap 9.7 mmol/L      eGFR 78.2 mL/min/1.73     Narrative:      GFR Normal >60  Chronic Kidney Disease <60  Kidney Failure <15      BNP [[id number]]  (Abnormal) Collected: 01/18/23 1012    Specimen: Blood Updated: 01/18/23 1039     proBNP 4,799.0 pg/mL     Narrative:      Among patients with dyspnea, NT-proBNP is highly sensitive for the detection of acute congestive heart failure. In addition NT-proBNP of <300 pg/ml effectively rules out acute congestive heart failure with 99% negative predictive value.    Results may be falsely decreased if patient taking Biotin.      Troponin [[id number]]  (Normal) Collected: 01/18/23 1012    Specimen: Blood Updated: 01/18/23 1043     Troponin T <0.010 ng/mL     Narrative:      Troponin T Reference Range:  <= 0.03 ng/mL-   Negative for AMI  >0.03 ng/mL-     Abnormal for myocardial necrosis.  Clinicians would have to utilize clinical acumen, EKG, Troponin and serial changes to determine if it is an Acute Myocardial Infarction or myocardial injury due to an underlying chronic condition.       Results may be falsely decreased if patient taking Biotin.      CBC Auto Differential [[id number]]  (Abnormal) Collected: 01/18/23 1012    Specimen: Blood Updated: 01/18/23 1019     WBC 6.88 10*3/mm3      RBC 3.80 10*6/mm3      Hemoglobin 11.9 g/dL      Hematocrit 35.0 %      MCV 92.1 fL      MCH 31.3 pg      MCHC 34.0 g/dL      RDW 14.2 %      RDW-SD 48.1 fl      MPV 8.7 fL      Platelets 284 10*3/mm3      Neutrophil % 72.2 %      Lymphocyte % 20.1 %      Monocyte % 6.3 %      Eosinophil % 0.6 %      Basophil % 0.4 %      Immature Grans % 0.4 %      Neutrophils, Absolute 4.97 10*3/mm3      Lymphocytes, Absolute 1.38 10*3/mm3      Monocytes, Absolute 0.43 10*3/mm3      Eosinophils, Absolute 0.04 10*3/mm3      Basophils, Absolute 0.03 10*3/mm3       Immature Grans, Absolute 0.03 10*3/mm3      nRBC 0.0 /100 WBC            Imaging:    XR Chest 1 View    Result Date: 1/18/2023  PROCEDURE: XR CHEST 1 VW  COMPARISON: [facility], CR, XR CHEST 1 VW, 10/21/2022, 18:37.  INDICATIONS: Chest tightness, Shortness of breath  FINDINGS:  The heart looks enlarged.  There is a shadow along the peripheral aspect of the right lower chest which has been noted although less pronounced and may relate to some pleural thickening.  There probably is a small right pleural effusion.  There is slight prominence of central pulmonary vasculature which could be reflective of some vascular congestion.        1. Less pleural based density in the right lower chest as compared to prior study although there is a small right pleural effusion and possibly some pleural thickening 2. Cardiomegaly with what looks like some vascular congestion.       NAKIA PINK MD       Electronically Signed and Approved By: NAKIA PINK MD on 1/18/2023 at 10:39                 Differential Diagnosis and Discussion:    Dyspnea: Differential diagnosis includes but is not limited to metabolic acidosis, neurological disorders, psychogenic, asthma, pneumothorax, upper airway obstruction, COPD, pneumonia, noncardiogenic pulmonary edema, interstitial lung disease, anemia, congestive heart failure, and pulmonary embolism    All labs were reviewed and analyzed by me.  All X-rays were independently reviewed by me.    MDM  Number of Diagnoses or Management Options  Anxiety  Atrial fibrillation, unspecified type (HCC)  Congestive heart failure, unspecified HF chronicity, unspecified heart failure type (HCC)  Diagnosis management comments: In summary this is a 68-year-old male who presents to the emerged department for evaluation of mildly increased shortness of breath.  Does endorse a history of congestive heart failure and reports compliance with his home medication regimen.  Chest x-ray today reveals no  significant pulmonary edema, BNP mildly elevated, troponin normal.  CBC independently reviewed by me and shows no critical abnormalities.  CMP independently reviewed by me and shows no critical abnormalities.  Patient is received additional dose of Lasix in the emergency department as well as oral Valium for anxiety.  He is otherwise well-appearing and in no acute distress.  He is not hypoxic.  Very strict return to ER and follow-up instructions have been provided to the patient.             Patient Care Considerations:    CT CHEST: I considered ordering a CT scan of the chest, however Patient is not hypoxic and chest x-ray is not significantly changed.      Consultants/Shared Management Plan:    None    Social Determinants of Health:    Patient is independent, reliable, and has access to care.       Disposition and Care Coordination:    Discharged: I considered escalation of care by admitting this patient for observation, however the patient has improved and is suitable and  stable for discharge.    I have explained discharge medications and the need for follow up with the patient/caretakers. This was also printed in the discharge instructions. Patient was discharged with the following medications and follow up:      Medication List      No changes were made to your prescriptions during this visit.      Shelly Mejia MD  [address] [facility] [address] [phone]    In 2 days         Final diagnoses:   Congestive heart failure, unspecified HF chronicity, unspecified heart failure type (HCC)   Atrial fibrillation, unspecified type (HCC)   Anxiety        ED Disposition     ED Disposition   Discharge    Condition   Stable    Comment   --             This medical record created using voice recognition software.           Nion Hearn MD  01/18/23 [id number]

## 2023-05-23 ENCOUNTER — TRANSCRIBE ORDERS (OUTPATIENT)
Dept: ADMINISTRATIVE | Facility: HOSPITAL | Age: 68
End: 2023-05-23
Payer: MEDICARE

## 2023-05-23 ENCOUNTER — HOSPITAL ENCOUNTER (OUTPATIENT)
Dept: GENERAL RADIOLOGY | Facility: HOSPITAL | Age: 68
Discharge: HOME | End: 2023-05-23
Payer: MEDICARE

## 2023-05-23 ENCOUNTER — LAB (OUTPATIENT)
Dept: LAB | Facility: HOSPITAL | Age: 68
End: 2023-05-23
Payer: MEDICARE

## 2023-05-23 DIAGNOSIS — Z79.899 ENCOUNTER FOR LONG-TERM (CURRENT) USE OF OTHER MEDICATIONS: Primary | ICD-10-CM

## 2023-05-23 DIAGNOSIS — J18.1 UNRESOLVED LOBAR PNEUMONIA: Primary | ICD-10-CM

## 2023-05-23 DIAGNOSIS — M25.50 PAIN IN JOINT, MULTIPLE SITES: ICD-10-CM

## 2023-05-23 DIAGNOSIS — M05.39 RHEUMATOID HEART DISEASE WITH RHEUMATOID ARTHRITIS OF MULTIPLE SITES: ICD-10-CM

## 2023-05-23 DIAGNOSIS — J18.1 UNRESOLVED LOBAR PNEUMONIA: ICD-10-CM

## 2023-05-23 DIAGNOSIS — Z79.899 ENCOUNTER FOR LONG-TERM (CURRENT) USE OF OTHER MEDICATIONS: ICD-10-CM

## 2023-05-23 LAB
ALBUMIN SERPL-MCNC: 4.2 G/DL (ref 3.5–5.2)
ALT SERPL W P-5'-P-CCNC: 12 U/L (ref 1–41)
ANION GAP SERPL CALCULATED.3IONS-SCNC: 11 MMOL/L (ref 5–15)
AST SERPL-CCNC: 18 U/L (ref 1–40)
BASOPHILS # BLD AUTO: 0.05 10*3/MM3 (ref 0–0.2)
BASOPHILS NFR BLD AUTO: 0.7 % (ref 0–1.5)
BUN SERPL-MCNC: 18 MG/DL (ref 8–23)
BUN/CREAT SERPL: 20 (ref 7–25)
CALCIUM SPEC-SCNC: 9.6 MG/DL (ref 8.6–10.5)
CHLORIDE SERPL-SCNC: 104 MMOL/L (ref 98–107)
CK SERPL-CCNC: 133 U/L (ref 20–200)
CO2 SERPL-SCNC: 25 MMOL/L (ref 22–29)
CREAT SERPL-MCNC: 0.9 MG/DL (ref 0.76–1.27)
CRP SERPL-MCNC: 6.5 MG/DL (ref 0.01–0.5)
DEPRECATED RDW RBC AUTO: 49.2 FL (ref 37–54)
EGFRCR SERPLBLD CKD-EPI 2021: 93 ML/MIN/1.73
EOSINOPHIL # BLD AUTO: 0.04 10*3/MM3 (ref 0–0.4)
EOSINOPHIL NFR BLD AUTO: 0.5 % (ref 0.3–6.2)
ERYTHROCYTE [DISTWIDTH] IN BLOOD BY AUTOMATED COUNT: 14.5 % (ref 12.3–15.4)
GLUCOSE SERPL-MCNC: 80 MG/DL (ref 65–99)
HCT VFR BLD AUTO: 36.4 % (ref 37.5–51)
HGB BLD-MCNC: 12.6 G/DL (ref 13–17.7)
IMM GRANULOCYTES # BLD AUTO: 0.02 10*3/MM3 (ref 0–0.05)
IMM GRANULOCYTES NFR BLD AUTO: 0.3 % (ref 0–0.5)
LYMPHOCYTES # BLD AUTO: 1.66 10*3/MM3 (ref 0.7–3.1)
LYMPHOCYTES NFR BLD AUTO: 21.7 % (ref 19.6–45.3)
MCH RBC QN AUTO: 31.9 PG (ref 26.6–33)
MCHC RBC AUTO-ENTMCNC: 34.6 G/DL (ref 31.5–35.7)
MCV RBC AUTO: 92.2 FL (ref 79–97)
MONOCYTES # BLD AUTO: 0.57 10*3/MM3 (ref 0.1–0.9)
MONOCYTES NFR BLD AUTO: 7.5 % (ref 5–12)
NEUTROPHILS NFR BLD AUTO: 5.31 10*3/MM3 (ref 1.7–7)
NEUTROPHILS NFR BLD AUTO: 69.3 % (ref 42.7–76)
NRBC BLD AUTO-RTO: 0 /100 WBC (ref 0–0.2)
PLATELET # BLD AUTO: 381 10*3/MM3 (ref 140–450)
PMV BLD AUTO: 10.2 FL (ref 6–12)
POTASSIUM SERPL-SCNC: 4.6 MMOL/L (ref 3.5–5.2)
RBC # BLD AUTO: 3.95 10*6/MM3 (ref 4.14–5.8)
SODIUM SERPL-SCNC: 140 MMOL/L (ref 136–145)
URATE SERPL-MCNC: 10.4 MG/DL (ref 3.4–7)
WBC NRBC COR # BLD: 7.65 10*3/MM3 (ref 3.4–10.8)

## 2023-05-23 PROCEDURE — 84450 TRANSFERASE (AST) (SGOT): CPT

## 2023-05-23 PROCEDURE — 84460 ALANINE AMINO (ALT) (SGPT): CPT

## 2023-05-23 PROCEDURE — 82550 ASSAY OF CK (CPK): CPT

## 2023-05-23 PROCEDURE — 71046 X-RAY EXAM CHEST 2 VIEWS: CPT

## 2023-05-23 PROCEDURE — 36415 COLL VENOUS BLD VENIPUNCTURE: CPT

## 2023-05-23 PROCEDURE — 85025 COMPLETE CBC W/AUTO DIFF WBC: CPT

## 2023-05-23 PROCEDURE — 84550 ASSAY OF BLOOD/URIC ACID: CPT

## 2023-05-23 PROCEDURE — 82040 ASSAY OF SERUM ALBUMIN: CPT

## 2023-05-23 PROCEDURE — 80048 BASIC METABOLIC PNL TOTAL CA: CPT

## 2023-05-23 PROCEDURE — 86141 C-REACTIVE PROTEIN HS: CPT

## 2023-08-07 DIAGNOSIS — Z79.4 ENCOUNTER FOR LONG-TERM (CURRENT) USE OF INSULIN: Primary | ICD-10-CM

## 2023-10-27 ENCOUNTER — LAB (OUTPATIENT)
Dept: LAB | Facility: HOSPITAL | Age: 68
End: 2023-10-27
Payer: MEDICARE

## 2023-10-27 DIAGNOSIS — Z79.4 ENCOUNTER FOR LONG-TERM (CURRENT) USE OF INSULIN: ICD-10-CM

## 2023-10-27 LAB
ALBUMIN SERPL-MCNC: 3.6 G/DL (ref 3.5–5.2)
ALT SERPL W P-5'-P-CCNC: 13 U/L (ref 1–41)
AST SERPL-CCNC: 18 U/L (ref 1–40)
BASOPHILS # BLD AUTO: 0.05 10*3/MM3 (ref 0–0.2)
BASOPHILS NFR BLD AUTO: 0.6 % (ref 0–1.5)
CREAT SERPL-MCNC: 1.01 MG/DL (ref 0.76–1.27)
CRP SERPL-MCNC: 6.11 MG/DL (ref 0–0.5)
DEPRECATED RDW RBC AUTO: 51 FL (ref 37–54)
EGFRCR SERPLBLD CKD-EPI 2021: 81 ML/MIN/1.73
EOSINOPHIL # BLD AUTO: 0.07 10*3/MM3 (ref 0–0.4)
EOSINOPHIL NFR BLD AUTO: 0.8 % (ref 0.3–6.2)
ERYTHROCYTE [DISTWIDTH] IN BLOOD BY AUTOMATED COUNT: 15 % (ref 12.3–15.4)
HCT VFR BLD AUTO: 38.7 % (ref 37.5–51)
HGB BLD-MCNC: 12.7 G/DL (ref 13–17.7)
IMM GRANULOCYTES # BLD AUTO: 0.05 10*3/MM3 (ref 0–0.05)
IMM GRANULOCYTES NFR BLD AUTO: 0.6 % (ref 0–0.5)
LYMPHOCYTES # BLD AUTO: 1.88 10*3/MM3 (ref 0.7–3.1)
LYMPHOCYTES NFR BLD AUTO: 21.1 % (ref 19.6–45.3)
MCH RBC QN AUTO: 30.3 PG (ref 26.6–33)
MCHC RBC AUTO-ENTMCNC: 32.8 G/DL (ref 31.5–35.7)
MCV RBC AUTO: 92.4 FL (ref 79–97)
MONOCYTES # BLD AUTO: 0.75 10*3/MM3 (ref 0.1–0.9)
MONOCYTES NFR BLD AUTO: 8.4 % (ref 5–12)
NEUTROPHILS NFR BLD AUTO: 6.13 10*3/MM3 (ref 1.7–7)
NEUTROPHILS NFR BLD AUTO: 68.5 % (ref 42.7–76)
NRBC BLD AUTO-RTO: 0 /100 WBC (ref 0–0.2)
PLATELET # BLD AUTO: 432 10*3/MM3 (ref 140–450)
PMV BLD AUTO: 9.1 FL (ref 6–12)
RBC # BLD AUTO: 4.19 10*6/MM3 (ref 4.14–5.8)
URATE SERPL-MCNC: 9.1 MG/DL (ref 3.4–7)
WBC NRBC COR # BLD: 8.93 10*3/MM3 (ref 3.4–10.8)

## 2023-10-27 PROCEDURE — 82565 ASSAY OF CREATININE: CPT

## 2023-10-27 PROCEDURE — 82040 ASSAY OF SERUM ALBUMIN: CPT

## 2023-10-27 PROCEDURE — 84450 TRANSFERASE (AST) (SGOT): CPT

## 2023-10-27 PROCEDURE — 36415 COLL VENOUS BLD VENIPUNCTURE: CPT

## 2023-10-27 PROCEDURE — 85025 COMPLETE CBC W/AUTO DIFF WBC: CPT

## 2023-10-27 PROCEDURE — 86140 C-REACTIVE PROTEIN: CPT

## 2023-10-27 PROCEDURE — 84550 ASSAY OF BLOOD/URIC ACID: CPT

## 2023-10-27 PROCEDURE — 84460 ALANINE AMINO (ALT) (SGPT): CPT

## 2023-11-06 DIAGNOSIS — Z79.4 ENCOUNTER FOR LONG-TERM (CURRENT) USE OF INSULIN: Primary | ICD-10-CM

## 2024-01-04 ENCOUNTER — TRANSCRIBE ORDERS (OUTPATIENT)
Dept: LAB | Facility: HOSPITAL | Age: 69
End: 2024-01-04
Payer: MEDICARE

## 2024-01-04 ENCOUNTER — LAB (OUTPATIENT)
Dept: LAB | Facility: HOSPITAL | Age: 69
End: 2024-01-04
Payer: MEDICARE

## 2024-01-04 DIAGNOSIS — E78.5 HYPERLIPIDEMIA, UNSPECIFIED HYPERLIPIDEMIA TYPE: ICD-10-CM

## 2024-01-04 DIAGNOSIS — M79.2 NEURALGIA: Primary | ICD-10-CM

## 2024-01-04 DIAGNOSIS — D50.8 OTHER IRON DEFICIENCY ANEMIA: ICD-10-CM

## 2024-01-04 DIAGNOSIS — M51.37 OTHER INTERVERTEBRAL DISC DEGENERATION, LUMBOSACRAL REGION: ICD-10-CM

## 2024-01-04 DIAGNOSIS — D84.9 IMMUNOCOMPROMISED STATE: ICD-10-CM

## 2024-01-04 DIAGNOSIS — M18.9 OSTEOARTHRITIS OF CARPOMETACARPAL (CMC) JOINT OF THUMB, UNSPECIFIED LATERALITY, UNSPECIFIED OSTEOARTHRITIS TYPE: ICD-10-CM

## 2024-01-04 DIAGNOSIS — M79.10 MYALGIA: ICD-10-CM

## 2024-01-04 DIAGNOSIS — M51.36 OTHER INTERVERTEBRAL DISC DEGENERATION, LUMBAR REGION: ICD-10-CM

## 2024-01-04 DIAGNOSIS — M79.2 NEURALGIA: ICD-10-CM

## 2024-01-04 DIAGNOSIS — I10 ESSENTIAL (PRIMARY) HYPERTENSION: ICD-10-CM

## 2024-01-04 DIAGNOSIS — K59.01 CONSTIPATION BY DELAYED COLONIC TRANSIT: ICD-10-CM

## 2024-01-04 DIAGNOSIS — E55.9 VITAMIN D DEFICIENCY DISEASE: ICD-10-CM

## 2024-01-04 DIAGNOSIS — E55.9 VITAMIN D DEFICIENCY: ICD-10-CM

## 2024-01-04 DIAGNOSIS — M79.2 NEURITIS: ICD-10-CM

## 2024-01-04 DIAGNOSIS — E29.1 TESTICULAR HYPOFUNCTION: ICD-10-CM

## 2024-01-04 DIAGNOSIS — E55.9 VITAMIN D DEFICIENCY, UNSPECIFIED: ICD-10-CM

## 2024-01-04 DIAGNOSIS — I10 HYPERTENSION, UNSPECIFIED TYPE: ICD-10-CM

## 2024-01-04 PROCEDURE — 84439 ASSAY OF FREE THYROXINE: CPT

## 2024-01-04 PROCEDURE — 80061 LIPID PANEL: CPT

## 2024-01-04 PROCEDURE — 84403 ASSAY OF TOTAL TESTOSTERONE: CPT

## 2024-01-04 PROCEDURE — 86140 C-REACTIVE PROTEIN: CPT

## 2024-01-04 PROCEDURE — 84550 ASSAY OF BLOOD/URIC ACID: CPT

## 2024-01-04 PROCEDURE — 82607 VITAMIN B-12: CPT

## 2024-01-04 PROCEDURE — 82550 ASSAY OF CK (CPK): CPT

## 2024-01-04 PROCEDURE — 85025 COMPLETE CBC W/AUTO DIFF WBC: CPT

## 2024-01-04 PROCEDURE — 84443 ASSAY THYROID STIM HORMONE: CPT

## 2024-01-04 PROCEDURE — 80053 COMPREHEN METABOLIC PANEL: CPT

## 2024-01-04 PROCEDURE — 36415 COLL VENOUS BLD VENIPUNCTURE: CPT

## 2024-01-04 PROCEDURE — 82746 ASSAY OF FOLIC ACID SERUM: CPT

## 2024-01-05 LAB
ALBUMIN SERPL-MCNC: 4.4 G/DL (ref 3.5–5.2)
ALBUMIN/GLOB SERPL: 0.9 G/DL
ALP SERPL-CCNC: 103 U/L (ref 39–117)
ALT SERPL W P-5'-P-CCNC: 10 U/L (ref 1–41)
ANION GAP SERPL CALCULATED.3IONS-SCNC: 12.9 MMOL/L (ref 5–15)
AST SERPL-CCNC: 22 U/L (ref 1–40)
BASOPHILS # BLD AUTO: 0.05 10*3/MM3 (ref 0–0.2)
BASOPHILS NFR BLD AUTO: 0.9 % (ref 0–1.5)
BILIRUB SERPL-MCNC: 0.5 MG/DL (ref 0–1.2)
BUN SERPL-MCNC: 20 MG/DL (ref 8–23)
BUN/CREAT SERPL: 17.7 (ref 7–25)
CALCIUM SPEC-SCNC: 10 MG/DL (ref 8.6–10.5)
CHLORIDE SERPL-SCNC: 97 MMOL/L (ref 98–107)
CHOLEST SERPL-MCNC: 193 MG/DL (ref 0–200)
CK SERPL-CCNC: 82 U/L (ref 20–200)
CO2 SERPL-SCNC: 26.1 MMOL/L (ref 22–29)
CREAT SERPL-MCNC: 1.13 MG/DL (ref 0.76–1.27)
CRP SERPL-MCNC: 1.94 MG/DL (ref 0–0.5)
DEPRECATED RDW RBC AUTO: 47.3 FL (ref 37–54)
EGFRCR SERPLBLD CKD-EPI 2021: 70.8 ML/MIN/1.73
EOSINOPHIL # BLD AUTO: 0.04 10*3/MM3 (ref 0–0.4)
EOSINOPHIL NFR BLD AUTO: 0.8 % (ref 0.3–6.2)
ERYTHROCYTE [DISTWIDTH] IN BLOOD BY AUTOMATED COUNT: 14.6 % (ref 12.3–15.4)
FOLATE SERPL-MCNC: >20 NG/ML (ref 4.78–24.2)
GLOBULIN UR ELPH-MCNC: 5.1 GM/DL
GLUCOSE SERPL-MCNC: 89 MG/DL (ref 65–99)
HCT VFR BLD AUTO: 45.1 % (ref 37.5–51)
HDLC SERPL-MCNC: 52 MG/DL (ref 40–60)
HGB BLD-MCNC: 14.9 G/DL (ref 13–17.7)
IMM GRANULOCYTES # BLD AUTO: 0.02 10*3/MM3 (ref 0–0.05)
IMM GRANULOCYTES NFR BLD AUTO: 0.4 % (ref 0–0.5)
LDLC SERPL CALC-MCNC: 125 MG/DL (ref 0–100)
LDLC/HDLC SERPL: 2.37 {RATIO}
LYMPHOCYTES # BLD AUTO: 1.88 10*3/MM3 (ref 0.7–3.1)
LYMPHOCYTES NFR BLD AUTO: 35.3 % (ref 19.6–45.3)
MCH RBC QN AUTO: 29.9 PG (ref 26.6–33)
MCHC RBC AUTO-ENTMCNC: 33 G/DL (ref 31.5–35.7)
MCV RBC AUTO: 90.6 FL (ref 79–97)
MONOCYTES # BLD AUTO: 0.54 10*3/MM3 (ref 0.1–0.9)
MONOCYTES NFR BLD AUTO: 10.2 % (ref 5–12)
NEUTROPHILS NFR BLD AUTO: 2.79 10*3/MM3 (ref 1.7–7)
NEUTROPHILS NFR BLD AUTO: 52.4 % (ref 42.7–76)
NRBC BLD AUTO-RTO: 0 /100 WBC (ref 0–0.2)
PLATELET # BLD AUTO: 405 10*3/MM3 (ref 140–450)
PMV BLD AUTO: 9.3 FL (ref 6–12)
POTASSIUM SERPL-SCNC: 5.1 MMOL/L (ref 3.5–5.2)
PROT SERPL-MCNC: 9.5 G/DL (ref 6–8.5)
RBC # BLD AUTO: 4.98 10*6/MM3 (ref 4.14–5.8)
SODIUM SERPL-SCNC: 136 MMOL/L (ref 136–145)
T4 FREE SERPL-MCNC: 1.17 NG/DL (ref 0.93–1.7)
TESTOST SERPL-MCNC: 282 NG/DL (ref 193–740)
TRIGL SERPL-MCNC: 88 MG/DL (ref 0–150)
TSH SERPL DL<=0.05 MIU/L-ACNC: 0.97 UIU/ML (ref 0.27–4.2)
URATE SERPL-MCNC: 9.7 MG/DL (ref 3.4–7)
VIT B12 BLD-MCNC: 459 PG/ML (ref 211–946)
VLDLC SERPL-MCNC: 16 MG/DL (ref 5–40)
WBC NRBC COR # BLD AUTO: 5.32 10*3/MM3 (ref 3.4–10.8)

## 2024-01-17 ENCOUNTER — APPOINTMENT (OUTPATIENT)
Dept: GENERAL RADIOLOGY | Facility: HOSPITAL | Age: 69
End: 2024-01-17
Payer: MEDICARE

## 2024-01-17 ENCOUNTER — HOSPITAL ENCOUNTER (EMERGENCY)
Facility: HOSPITAL | Age: 69
Discharge: HOME | End: 2024-01-17
Attending: EMERGENCY MEDICINE | Admitting: EMERGENCY MEDICINE
Payer: MEDICARE

## 2024-01-17 VITALS
SYSTOLIC BLOOD PRESSURE: 118 MMHG | HEART RATE: 85 BPM | OXYGEN SATURATION: 100 % | RESPIRATION RATE: 16 BRPM | HEIGHT: 68 IN | TEMPERATURE: 97.8 F | BODY MASS INDEX: 20.21 KG/M2 | DIASTOLIC BLOOD PRESSURE: 74 MMHG

## 2024-01-17 DIAGNOSIS — S89.92XA LEFT KNEE INJURY, INITIAL ENCOUNTER: Primary | ICD-10-CM

## 2024-01-17 DIAGNOSIS — S80.02XA CONTUSION OF LEFT KNEE, INITIAL ENCOUNTER: ICD-10-CM

## 2024-01-17 DIAGNOSIS — S93.402A MILD SPRAIN OF LEFT ANKLE, INITIAL ENCOUNTER: ICD-10-CM
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
## 2024-01-17 PROCEDURE — 25010000002 KETOROLAC TROMETHAMINE PER 15 MG: Performed by: EMERGENCY MEDICINE

## 2024-01-17 PROCEDURE — 99283 EMERGENCY DEPT VISIT LOW MDM: CPT

## 2024-01-17 PROCEDURE — 96372 THER/PROPH/DIAG INJ SC/IM: CPT

## 2024-01-17 PROCEDURE — 73560 X-RAY EXAM OF KNEE 1 OR 2: CPT

## 2024-01-17 PROCEDURE — 73610 X-RAY EXAM OF ANKLE: CPT

## 2024-01-17 RX ORDER — HYDROCODONE BITARTRATE AND ACETAMINOPHEN 7.5; 325 MG/1; MG/1
1 TABLET ORAL ONCE
Status: COMPLETED | OUTPATIENT
Start: 2024-01-17 | End: 2024-01-17

## 2024-01-17 RX ORDER — KETOROLAC TROMETHAMINE 30 MG/ML
30 INJECTION, SOLUTION INTRAMUSCULAR; INTRAVENOUS ONCE
Status: COMPLETED | OUTPATIENT
Start: 2024-01-17 | End: 2024-01-17

## 2024-01-17 RX ADMIN — HYDROCODONE BITARTRATE AND ACETAMINOPHEN 1 TABLET: 7.5; 325 TABLET ORAL at 19:56

## 2024-01-17 RX ADMIN — KETOROLAC TROMETHAMINE 30 MG: 30 INJECTION, SOLUTION INTRAMUSCULAR; INTRAVENOUS at 19:56

## 2024-01-17 NOTE — Clinical Note
[facility] EMERGENCY ROOM  [address]  [place] [address]  Phone: [phone]    JOSETTE GOLD accompanied Hai Gold to the emergency department on 1/17/2024. They may return to work on 01/20/2024.        Thank you for choosing [facility].    Rowan Smith APRN

## 2024-01-18 NOTE — DISCHARGE INSTRUCTIONS
Rest, ice, and elevate.  Wear your knee immobilizer for stability.  Use your walking boot for ambulation.  Use your walker at home with these splints and devices to help you get around.  Use caution with ambulation not have any additional falls or injuries.  Ice areas for comfort 20 minutes at a time several times a day.  Continue with your home pain medications as prescribed.  Follow-up with Dr. Mejia in 2 to 3 days for reevaluation and further treatment as necessary.  Return to the emergency department for any acutely worsening swelling, any increase in pain is not relieved with your home pain medications, or any new or worse concerns.

## 2024-01-18 NOTE — ED PROVIDER NOTES
Time: 7:29 PM EST  Date of encounter:  1/17/2024  Independent Historian/Clinical History and Information was obtained by:   Patient    History is limited by: N/A    Chief Complaint   Patient presents with    Ankle Injury     Pt fell on ice complains of left leg, ankle pain         History of Present Illness:  The patient is a 69 y.o. year old male who presents to the emergency department for evaluation of Left leg pain after slipping on ice and falling pt. Pt states his entire leg hurts but his ankle and knee hurt the worse. He does not complain of hip pain and he did not strike head. There is noted swelling to left ankle.  He reports that his lateral pain to his left knee and ankle is worse.  He reports increased pain at the slightest touch.  He is able to flex and extend his toes ankle and knee but does report increased pain.  He is neurovascular intact.    Patient Care Team  Primary Care Provider: Shelly Mejia MD    Past Medical History:     Allergies   Allergen Reactions    Duloxetine Other (See Comments)     Shaking and slurred speech    Levofloxacin Angioedema    Penicillins Hives     Past Medical History:   Diagnosis Date    Arthritis     CHF (congestive heart failure)     Hypertension      Past Surgical History:   Procedure Laterality Date    COLONOSCOPY      COLONOSCOPY N/A 6/27/2022    Procedure: COLONOSCOPY WITH POLYPECTOMIES;  Surgeon: Min Campbell MD;  Location: [facility];  Service: Gastroenterology;  Laterality: N/A;  COLON POLYPS, DIVERTICULOSIS    ENDOSCOPY N/A 03/29/2022    Procedure: ESOPHAGOGASTRODUODENOSCOPY WITH BIOPSIES;  Surgeon: Min Campbell MD;  Location: [facility] END[facility]COPY;  Service: Gastroenterology;  Laterality: N/A;  HIATAL HERNIA    HEMORRHOIDECTOMY       History reviewed. No pertinent family history.    Home Medications:  Prior to Admission medications    Medication Sig Start Date End Date Taking? Authorizing Provider   ALBUTEROL IN 2 puff(s)    Provider,  MD Kat   apixaban (ELIQUIS) 2.5 MG tablet tablet Take 2.5 mg by mouth 2 (Two) Times a Day.    Kat Aguilar MD   aspirin 81 MG EC tablet Take 81 mg by mouth Daily.    Kat Aguilar MD   Etanercept (Enbrel Mini) 50 MG/ML solution cartridge Inject 50 mg under the skin into the appropriate area as directed Every 7 (Seven) Days. Mon    Kat Aguilar MD   folic acid (FOLVITE) 1 MG tablet Take 1 mg by mouth Daily. 21   Kat Aguilar MD   furosemide (LASIX) 20 MG tablet Take 1 tablet by mouth Daily for 5 days. 10/21/22 10/26/22  Myron Steen MD   linaclotide (LINZESS) 145 MCG capsule capsule Take 145 mcg by mouth Every Morning Before Breakfast.    Kat Aguilar MD   methotrexate 2.5 MG tablet Take 12.5 mg by mouth 1 (One) Time Per Week. Friday (5 tabs)    Kat Aguilar MD   metoprolol succinate XL (TOPROL-XL) 25 MG 24 hr tablet Take 1 tablet by mouth Daily for 30 days. 9/23/22 10/23/22  Lazaro May MD   ondansetron (Zofran) 4 MG tablet 1 tab(s)    Kat Aguilar MD   oxyCODONE-acetaminophen (PERCOCET) 7.5-325 MG per tablet Take 1 tablet by mouth 2 (Two) Times a Day As Needed. 22   Kat Aguilar MD   pantoprazole (PROTONIX) 20 MG EC tablet Take 20 mg by mouth Daily.    Kat Aguilar MD   Testosterone Enanthate 200 MG/ML solution Inject 200 mg into the appropriate muscle as directed by prescriber Every 14 (Fourteen) Days. Wed    Kat Aguilar MD        Social History:   Social History     Tobacco Use    Smoking status: Former     Types: Cigarettes     Quit date:      Years since quittin.0    Smokeless tobacco: Never   Substance Use Topics    Alcohol use: Not Currently    Drug use: Never         Review of Systems:  Review of Systems   Constitutional:  Negative for chills and fever.   HENT:  Negative for congestion, ear pain and sore throat.    Eyes:  Negative for pain.   Respiratory:  Negative for cough, chest  "tightness and shortness of breath.    Cardiovascular:  Negative for chest pain.   Gastrointestinal:  Negative for abdominal pain, diarrhea, nausea and vomiting.   Genitourinary:  Negative for flank pain and hematuria.   Musculoskeletal:  Positive for arthralgias, gait problem, joint swelling and myalgias. Negative for back pain, neck pain and neck stiffness.   Skin:  Negative for color change, pallor and wound.   Neurological:  Negative for seizures and headaches.   All other systems reviewed and are negative.       Physical Exam:  /74   Pulse 85   Temp 97.8 °F (36.6 °C) (Oral)   Resp 16   Ht 172.7 cm (68\")   SpO2 100%   BMI 20.21 kg/m²         Physical Exam  Vitals and nursing note reviewed.   Constitutional:       General: He is not in acute distress.     Appearance: Normal appearance. He is not ill-appearing or toxic-appearing.   HENT:      Head: Normocephalic and atraumatic.   Eyes:      General: No scleral icterus.     Conjunctiva/sclera: Conjunctivae normal.      Pupils: Pupils are equal, round, and reactive to light.   Cardiovascular:      Rate and Rhythm: Normal rate and regular rhythm.      Pulses: Normal pulses.   Pulmonary:      Effort: Pulmonary effort is normal. No respiratory distress.   Abdominal:      General: Abdomen is flat. There is no distension.      Palpations: Abdomen is soft.   Musculoskeletal:         General: Swelling, tenderness and signs of injury present. No deformity. Normal range of motion.      Cervical back: Normal range of motion and neck supple. No rigidity or tenderness.   Lymphadenopathy:      Cervical: No cervical adenopathy.   Skin:     General: Skin is warm and dry.      Capillary Refill: Capillary refill takes less than 2 seconds.      Findings: No bruising, erythema or rash.   Neurological:      General: No focal deficit present.      Mental Status: He is alert and oriented to person, place, and time. Mental status is at baseline.   Psychiatric:         Mood and " Affect: Mood normal.         Behavior: Behavior normal.                Procedures:  Procedures      Medical Decision Making:      Comorbidities that affect care:    Throat is, CHF, hypertension    External Notes reviewed:    None      The following orders were placed and all results were independently analyzed by me:  Orders Placed This Encounter   Procedures    [facility] 08.  CAM Boot, 05.  Knee Immobilizer; Yes; No; Yes    XR Ankle 3+ View Left    XR Knee 1 or 2 View Left    Obtain & Apply The Following- Lower extremity; Knee sleeve, Walking boot       Medications Given in the Emergency Department:  Medications   HYDROcodone-acetaminophen (NORCO) 7.5-325 MG per tablet 1 tablet (1 tablet Oral Given 1/17/24 1956)   ketorolac (TORADOL) injection 30 mg (30 mg Intramuscular Given 1/17/24 1956)        ED Course:    The patient was initially evaluated in the triage area where orders were placed. The patient was later dispositioned by AVELINO Benton.      The patient was advised to stay for completion of workup which includes but is not limited to communication of labs and radiological results, reassessment and plan. The patient was advised that leaving prior to disposition by a provider could result in critical findings that are not communicated to the patient.     ED Course as of 01/17/24 2204 Wed Jan 17, 2024 1929   --- PROVIDER IN TRIAGE NOTE ---    Patient was seen and evaluated in triage by AVELINO Gee.  Orders were written and the patient is currently awaiting disposition.   [MS]      ED Course User Index  [MS] Geeta Preciado APRN       Labs:    Lab Results (last 24 hours)       ** No results found for the last 24 hours. **             Imaging:    XR Knee 1 or 2 View Left    Result Date: 1/17/2024  PROCEDURE: XR KNEE 1 OR 2 VW LEFT  COMPARISON: None  INDICATIONS: FALL TODAY, LEFT KNEE PAIN  FINDINGS:  No evidence of fracture.  No significant joint effusion.  No bony or  joint abnormality       Negative for fracture      GUILLERMINA ZAMORANO MD       Electronically Signed and Approved By: GUILLERMINA ZAMORANO MD on 1/17/2024 at 20:11             XR Ankle 3+ View Left    Result Date: 1/17/2024  PROCEDURE: XR ANKLE 3+ VW LEFT  COMPARISON: None  INDICATIONS: FALL TODAY, LEFT ANKLE PAIN, SWELLING  FINDINGS:  Lateral soft tissue swelling.  No evidence of fracture or dislocation.  Vascular calcifications noted       Soft tissue swelling with no evidence of fracture      GUILLERMINA ZAMORANO MD       Electronically Signed and Approved By: GUILLERMINA ZAMORANO MD on 1/17/2024 at 20:11                Differential Diagnosis and Discussion:      Extremity Pain: Differential diagnosis includes but is not limited to soft tissue sprain, tendonitis, tendon injury, dislocation, fracture, deep vein thrombosis, arterial insufficiency, osteoarthritis, bursitis, and ligamentous damage.  Joint Pain: Differential diagnosis includes but is not limited to polyarticular arthritis, gout, tendinitis, hemarthrosis, septic arthritis, rheumatoid arthritis, bursitis, degenerative joint disease, joint effusion, autoimmune disorder, trauma, and occult neoplasm.    All X-rays impressions were independently interpreted by me.    MDM     Amount and/or Complexity of Data Reviewed  Tests in the radiology section of CPT®: reviewed           Patient Care Considerations:    NARCOTICS: I considered prescribing opiate pain medication as an outpatient, however patient already has narcotic pain medications at home.      Consultants/Shared Management Plan:    None    Social Determinants of Health:    Patient is independent, reliable, and has access to care.       Disposition and Care Coordination:    Discharged: The patient is suitable and stable for discharge with no need for consideration of observation or admission.    I have explained the patient´s condition, diagnoses and treatment plan based on the information available to me at this time. I  have answered questions and addressed any concerns. The patient has a good  understanding of the patient´s diagnosis, condition, and treatment plan as can be expected at this point. The vital signs have been stable. The patient´s condition is stable and appropriate for discharge from the emergency department.      The patient will pursue further outpatient evaluation with the primary care physician or other designated or consulting physician as outlined in the discharge instructions. They are agreeable to this plan of care and follow-up instructions have been explained in detail. The patient has received these instructions in written format and have expressed an understanding of the discharge instructions. The patient is aware that any significant change in condition or worsening of symptoms should prompt an immediate return to this or the closest emergency department or call to 1.  I have explained discharge medications and the need for follow up with the patient/caretakers. This was also printed in the discharge instructions. Patient was discharged with the following medications and follow up:      Medication List      No changes were made to your prescriptions during this visit.      Shelly Mejia MD  [address] [id number] [phone]    Call   FOR FOLLOW UP       Final diagnoses:   Left knee injury, initial encounter   Contusion of left knee, initial encounter   Mild sprain of left ankle, initial encounter   Fall, initial encounter        ED Disposition       ED Disposition   Discharge    Condition   Stable    Comment   --               This medical record created using voice recognition software.             Rowan Smith, AVELINO  01/17/24 0374

## 2024-02-05 DIAGNOSIS — Z79.4 ENCOUNTER FOR LONG-TERM (CURRENT) USE OF INSULIN: Primary | ICD-10-CM

## 2024-03-02 ENCOUNTER — HOSPITAL ENCOUNTER (EMERGENCY)
Facility: HOSPITAL | Age: 69
Discharge: HOME | End: 2024-03-02
Attending: EMERGENCY MEDICINE
Payer: MEDICARE

## 2024-03-02 ENCOUNTER — APPOINTMENT (OUTPATIENT)
Dept: CT IMAGING | Facility: HOSPITAL | Age: 69
End: 2024-03-02
Payer: MEDICARE

## 2024-03-02 ENCOUNTER — APPOINTMENT (OUTPATIENT)
Dept: GENERAL RADIOLOGY | Facility: HOSPITAL | Age: 69
End: 2024-03-02
Payer: MEDICARE

## 2024-03-02 VITALS
TEMPERATURE: 98 F | HEART RATE: 61 BPM | RESPIRATION RATE: 16 BRPM | WEIGHT: 137.35 LBS | OXYGEN SATURATION: 100 % | HEIGHT: 68 IN | SYSTOLIC BLOOD PRESSURE: 141 MMHG | DIASTOLIC BLOOD PRESSURE: 84 MMHG | BODY MASS INDEX: 20.82 KG/M2

## 2024-03-02 DIAGNOSIS — I50.9 ACUTE ON CHRONIC CONGESTIVE HEART FAILURE, UNSPECIFIED HEART FAILURE TYPE: ICD-10-CM

## 2024-03-02 DIAGNOSIS — R10.31 ABDOMINAL PAIN, ACUTE, RIGHT LOWER QUADRANT: ICD-10-CM

## 2024-03-02 DIAGNOSIS — J02.9 ACUTE PHARYNGITIS, UNSPECIFIED ETIOLOGY: Primary | ICD-10-CM

## 2024-03-02 DIAGNOSIS — K52.9 ENTERITIS: ICD-10-CM

## 2024-03-02 LAB
ALBUMIN SERPL-MCNC: 4.1 G/DL (ref 3.5–5.2)
ALBUMIN/GLOB SERPL: 0.9 G/DL
ALP SERPL-CCNC: 108 U/L (ref 39–117)
ALT SERPL W P-5'-P-CCNC: 11 U/L (ref 1–41)
ANION GAP SERPL CALCULATED.3IONS-SCNC: 10.5 MMOL/L (ref 5–15)
AST SERPL-CCNC: 16 U/L (ref 1–40)
BASOPHILS # BLD AUTO: 0.03 10*3/MM3 (ref 0–0.2)
BASOPHILS NFR BLD AUTO: 0.6 % (ref 0–1.5)
BILIRUB SERPL-MCNC: 0.3 MG/DL (ref 0–1.2)
BILIRUB UR QL STRIP: NEGATIVE
BUN SERPL-MCNC: 17 MG/DL (ref 8–23)
BUN/CREAT SERPL: 17.9 (ref 7–25)
CALCIUM SPEC-SCNC: 9.2 MG/DL (ref 8.6–10.5)
CHLORIDE SERPL-SCNC: 100 MMOL/L (ref 98–107)
CLARITY UR: CLEAR
CO2 SERPL-SCNC: 25.5 MMOL/L (ref 22–29)
COLOR UR: YELLOW
CREAT SERPL-MCNC: 0.95 MG/DL (ref 0.76–1.27)
D-LACTATE SERPL-SCNC: 1 MMOL/L (ref 0.5–2)
DEPRECATED RDW RBC AUTO: 47.7 FL (ref 37–54)
EGFRCR SERPLBLD CKD-EPI 2021: 86.6 ML/MIN/1.73
EOSINOPHIL # BLD AUTO: 0.11 10*3/MM3 (ref 0–0.4)
EOSINOPHIL NFR BLD AUTO: 2 % (ref 0.3–6.2)
ERYTHROCYTE [DISTWIDTH] IN BLOOD BY AUTOMATED COUNT: 14.4 % (ref 12.3–15.4)
FLUAV SUBTYP SPEC NAA+PROBE: NOT DETECTED
FLUBV RNA ISLT QL NAA+PROBE: NOT DETECTED
GLOBULIN UR ELPH-MCNC: 4.6 GM/DL
GLUCOSE SERPL-MCNC: 110 MG/DL (ref 65–99)
GLUCOSE UR STRIP-MCNC: NEGATIVE MG/DL
HCT VFR BLD AUTO: 35.6 % (ref 37.5–51)
HGB BLD-MCNC: 11.8 G/DL (ref 13–17.7)
HGB UR QL STRIP.AUTO: NEGATIVE
HOLD SPECIMEN: NORMAL
HOLD SPECIMEN: NORMAL
IMM GRANULOCYTES # BLD AUTO: 0.02 10*3/MM3 (ref 0–0.05)
IMM GRANULOCYTES NFR BLD AUTO: 0.4 % (ref 0–0.5)
KETONES UR QL STRIP: NEGATIVE
LEUKOCYTE ESTERASE UR QL STRIP.AUTO: NEGATIVE
LIPASE SERPL-CCNC: 24 U/L (ref 13–60)
LYMPHOCYTES # BLD AUTO: 1.65 10*3/MM3 (ref 0.7–3.1)
LYMPHOCYTES NFR BLD AUTO: 30.4 % (ref 19.6–45.3)
MCH RBC QN AUTO: 29.7 PG (ref 26.6–33)
MCHC RBC AUTO-ENTMCNC: 33.1 G/DL (ref 31.5–35.7)
MCV RBC AUTO: 89.7 FL (ref 79–97)
MONOCYTES # BLD AUTO: 0.44 10*3/MM3 (ref 0.1–0.9)
MONOCYTES NFR BLD AUTO: 8.1 % (ref 5–12)
NEUTROPHILS NFR BLD AUTO: 3.18 10*3/MM3 (ref 1.7–7)
NEUTROPHILS NFR BLD AUTO: 58.5 % (ref 42.7–76)
NITRITE UR QL STRIP: NEGATIVE
NRBC BLD AUTO-RTO: 0 /100 WBC (ref 0–0.2)
PH UR STRIP.AUTO: 5.5 [PH] (ref 5–8)
PLATELET # BLD AUTO: 292 10*3/MM3 (ref 140–450)
PMV BLD AUTO: 8.9 FL (ref 6–12)
POTASSIUM SERPL-SCNC: 4.4 MMOL/L (ref 3.5–5.2)
PROT SERPL-MCNC: 8.7 G/DL (ref 6–8.5)
PROT UR QL STRIP: NEGATIVE
RBC # BLD AUTO: 3.97 10*6/MM3 (ref 4.14–5.8)
RSV RNA NPH QL NAA+NON-PROBE: NOT DETECTED
S PYO AG THROAT QL: NEGATIVE
SARS-COV-2 RNA RESP QL NAA+PROBE: NOT DETECTED
SODIUM SERPL-SCNC: 136 MMOL/L (ref 136–145)
SP GR UR STRIP: 1.01 (ref 1–1.03)
UROBILINOGEN UR QL STRIP: NORMAL
WBC NRBC COR # BLD AUTO: 5.43 10*3/MM3 (ref 3.4–10.8)
WHOLE BLOOD HOLD COAG: NORMAL
WHOLE BLOOD HOLD SPECIMEN: NORMAL

## 2024-03-02 PROCEDURE — 74177 CT ABD & PELVIS W/CONTRAST: CPT

## 2024-03-02 PROCEDURE — 80053 COMPREHEN METABOLIC PANEL: CPT | Performed by: EMERGENCY MEDICINE

## 2024-03-02 PROCEDURE — 87637 SARSCOV2&INF A&B&RSV AMP PRB: CPT | Performed by: EMERGENCY MEDICINE

## 2024-03-02 PROCEDURE — 85025 COMPLETE CBC W/AUTO DIFF WBC: CPT | Performed by: EMERGENCY MEDICINE

## 2024-03-02 PROCEDURE — 25510000001 IOPAMIDOL PER 1 ML: Performed by: EMERGENCY MEDICINE

## 2024-03-02 PROCEDURE — 96374 THER/PROPH/DIAG INJ IV PUSH: CPT

## 2024-03-02 PROCEDURE — 83605 ASSAY OF LACTIC ACID: CPT | Performed by: EMERGENCY MEDICINE

## 2024-03-02 PROCEDURE — 83690 ASSAY OF LIPASE: CPT | Performed by: EMERGENCY MEDICINE

## 2024-03-02 PROCEDURE — 25010000002 FUROSEMIDE PER 20 MG: Performed by: EMERGENCY MEDICINE

## 2024-03-02 PROCEDURE — 99285 EMERGENCY DEPT VISIT HI MDM: CPT

## 2024-03-02 PROCEDURE — 87081 CULTURE SCREEN ONLY: CPT | Performed by: EMERGENCY MEDICINE

## 2024-03-02 PROCEDURE — 81003 URINALYSIS AUTO W/O SCOPE: CPT | Performed by: EMERGENCY MEDICINE

## 2024-03-02 PROCEDURE — 87880 STREP A ASSAY W/OPTIC: CPT | Performed by: EMERGENCY MEDICINE

## 2024-03-02 PROCEDURE — 96375 TX/PRO/DX INJ NEW DRUG ADDON: CPT

## 2024-03-02 PROCEDURE — 71045 X-RAY EXAM CHEST 1 VIEW: CPT

## 2024-03-02 PROCEDURE — 25010000002 KETOROLAC TROMETHAMINE PER 15 MG: Performed by: EMERGENCY MEDICINE

## 2024-03-02 RX ORDER — KETOROLAC TROMETHAMINE 15 MG/ML
15 INJECTION, SOLUTION INTRAMUSCULAR; INTRAVENOUS ONCE
Status: COMPLETED | OUTPATIENT
Start: 2024-03-02 | End: 2024-03-02

## 2024-03-02 RX ORDER — AZITHROMYCIN 250 MG/1
TABLET, FILM COATED ORAL
Qty: 6 TABLET | Refills: 0 | Status: SHIPPED | OUTPATIENT
Start: 2024-03-02

## 2024-03-02 RX ORDER — AZITHROMYCIN 250 MG/1
500 TABLET, FILM COATED ORAL ONCE
Status: COMPLETED | OUTPATIENT
Start: 2024-03-02 | End: 2024-03-02

## 2024-03-02 RX ORDER — ACETAMINOPHEN 325 MG/1
650 TABLET ORAL ONCE
Status: COMPLETED | OUTPATIENT
Start: 2024-03-02 | End: 2024-03-02

## 2024-03-02 RX ORDER — SODIUM CHLORIDE 0.9 % (FLUSH) 0.9 %
10 SYRINGE (ML) INJECTION AS NEEDED
Status: DISCONTINUED | OUTPATIENT
Start: 2024-03-02 | End: 2024-03-02 | Stop reason: HOSPADM

## 2024-03-02 RX ORDER — FUROSEMIDE 10 MG/ML
60 INJECTION INTRAMUSCULAR; INTRAVENOUS ONCE
Status: COMPLETED | OUTPATIENT
Start: 2024-03-02 | End: 2024-03-02

## 2024-03-02 RX ORDER — FUROSEMIDE 20 MG/1
40 TABLET ORAL DAILY
Qty: 7 TABLET | Refills: 0 | Status: SHIPPED | OUTPATIENT
Start: 2024-03-02

## 2024-03-02 RX ADMIN — ACETAMINOPHEN 650 MG: 325 TABLET ORAL at 07:40

## 2024-03-02 RX ADMIN — KETOROLAC TROMETHAMINE 15 MG: 15 INJECTION, SOLUTION INTRAMUSCULAR; INTRAVENOUS at 07:40

## 2024-03-02 RX ADMIN — FUROSEMIDE 60 MG: 10 INJECTION, SOLUTION INTRAMUSCULAR; INTRAVENOUS at 10:48

## 2024-03-02 RX ADMIN — AZITHROMYCIN DIHYDRATE 500 MG: 250 TABLET ORAL at 10:49

## 2024-03-02 RX ADMIN — IOPAMIDOL 100 ML: 755 INJECTION, SOLUTION INTRAVENOUS at 08:30

## 2024-03-02 NOTE — DISCHARGE INSTRUCTIONS
Your CT scan of the abdomen just showed that you may have a mild stomach virus causing some cramps or abdominal distention but no signs of a hernia or appendicitis or anything serious or requiring surgery today.    Your swabs were negative for COVID and flu and strep throat.    You can take the antibiotics to cover for any lung infection, and also take the Lasix water pill to get some fluid off you as there was a bit of fluid seen in your lungs.    Please call your primary care doctor Monday for a follow-up appointment.

## 2024-03-02 NOTE — ED PROVIDER NOTES
Time: 7:27 AM EST  Date of encounter:  3/2/2024  Independent Historian/Clinical History and Information was obtained by:   Patient    History is limited by: N/A    Chief Complaint: Sore throat, cough x 2 days; right groin pain      History of Present Illness:  Patient is a 69 y.o. year old male who presents to the emergency department for evaluation of acute onset of sore throat pain radiating to the ears and also new cough and congestion and sweats and subjective fevers and chills for the past couple days.    He denies any known sick contacts.    No vomiting or diarrhea or chest pain.    He also has second complaint of right groin pain intermittently for the past couple weeks and is concerned he could have a hernia.      He is urinating normally except for some urinary hesitancy at times and also no diarrhea but is somewhat constipated.    HPI    Patient Care Team  Primary Care Provider: Shelly Mejia MD    Past Medical History:     Allergies   Allergen Reactions    Duloxetine Other (See Comments)     Shaking and slurred speech    Levofloxacin Angioedema    Penicillins Hives     Past Medical History:   Diagnosis Date    Arthritis     CHF (congestive heart failure)     Hypertension      Past Surgical History:   Procedure Laterality Date    COLONOSCOPY      COLONOSCOPY N/A 6/27/2022    Procedure: COLONOSCOPY WITH POLYPECTOMIES;  Surgeon: Min Campbell MD;  Location: [facility] ENDOSCOPY;  Service: Gastroenterology;  Laterality: N/A;  COLON POLYPS, DIVERTICULOSIS    ENDOSCOPY N/A 03/29/2022    Procedure: ESOPHAGOGASTRODUODENOSCOPY WITH BIOPSIES;  Surgeon: Min Campbell MD;  Location: [facility] ENDOSCOPY;  Service: Gastroenterology;  Laterality: N/A;  HIATAL HERNIA    HEMORRHOIDECTOMY       History reviewed. No pertinent family history.    Home Medications:  Prior to Admission medications    Medication Sig Start Date End Date Taking? Authorizing Provider   ALBUTEROL IN 2 puff(s)    Provider, MD Kat    apixaban (ELIQUIS) 2.5 MG tablet tablet Take 2.5 mg by mouth 2 (Two) Times a Day.    Kat Aguilar MD   aspirin 81 MG EC tablet Take 81 mg by mouth Daily.    Kat Aguilar MD   Etanercept (Enbrel Mini) 50 MG/ML solution cartridge Inject 50 mg under the skin into the appropriate area as directed Every 7 (Seven) Days. Mon    Kat Aguilar MD   folic acid (FOLVITE) 1 MG tablet Take 1 mg by mouth Daily. 21   Kat Aguilar MD   furosemide (LASIX) 20 MG tablet Take 1 tablet by mouth Daily for 5 days. 10/21/22 10/26/22  Myron Steen MD   linaclotide (LINZESS) 145 MCG capsule capsule Take 145 mcg by mouth Every Morning Before Breakfast.    Kat Aguilar MD   methotrexate 2.5 MG tablet Take 12.5 mg by mouth 1 (One) Time Per Week. Friday (5 tabs)    Kat Aguilar MD   metoprolol succinate XL (TOPROL-XL) 25 MG 24 hr tablet Take 1 tablet by mouth Daily for 30 days. 9/23/22 10/23/22  Lazaro May MD   ondansetron (Zofran) 4 MG tablet 1 tab(s)    Kat Aguilar MD   oxyCODONE-acetaminophen (PERCOCET) 7.5-325 MG per tablet Take 1 tablet by mouth 2 (Two) Times a Day As Needed. 22   Kat Aguilar MD   pantoprazole (PROTONIX) 20 MG EC tablet Take 20 mg by mouth Daily.    Kat Aguilar MD   Testosterone Enanthate 200 MG/ML solution Inject 200 mg into the appropriate muscle as directed by prescriber Every 14 (Fourteen) Days. Wed    Kat Aguilar MD        Social History:   Social History     Tobacco Use    Smoking status: Former     Current packs/day: 0.00     Types: Cigarettes     Quit date:      Years since quittin.1    Smokeless tobacco: Never   Substance Use Topics    Alcohol use: Not Currently    Drug use: Never         Review of Systems:  Review of Systems   I performed a 10 point review of systems which was all negative, except for the positives found in the HPI above.      Physical Exam:  /87   Pulse 80   Temp 98  "°F (36.7 °C) (Oral)   Resp 16   Ht 172.7 cm (68\")   Wt 62.3 kg (137 lb 5.6 oz)   SpO2 100%   BMI 20.88 kg/m²         Physical Exam   General: Awake alert and in mild distress, looks to be feeling uncomfortable, moderately diaphoretic    HEENT: Head normocephalic atraumatic, eyes PERRLA EOMI, nose normal, oropharynx normal.  No tonsillar enlargement or exudates or lesions in the mouth, ear exam normal with clear TMs.    Neck: Supple full range of motion, no meningismus, no lymphadenopathy    Heart: Regular rate and rhythm, no murmurs or rubs, 2+ radial pulses bilaterally    Lungs: Clear to auscultation bilaterally without wheezes or crackles, no respiratory distress    Abdomen: Soft, mildly tender in the right lower quadrant, no obvious bulging or hernia in the right groin during Valsalva maneuver, nondistended, no rebound or guarding    Skin: Warm, , no rash; he is moderately diaphoretic    Musculoskeletal: Normal range of motion, no lower extremity edema    Neurologic: Oriented x3, no motor deficits no sensory deficits    Psychiatric: Mood appears stable, no psychosis          Procedures:  Procedures      Medical Decision Making:      Comorbidities that affect care:    Congestive Heart Failure, Hypertension    External Notes reviewed:    None      The following orders were placed and all results were independently analyzed by me:  Orders Placed This Encounter   Procedures    COVID-19, FLU A/B, RSV PCR 1 HR TAT - Swab, Nasopharynx    Rapid Strep A Screen - Swab, Throat    Beta Strep Culture, Throat - Swab, Throat    XR Chest 1 View    CT Abdomen Pelvis With Contrast    Cylinder Draw    Comprehensive Metabolic Panel    Lipase    Urinalysis With Microscopic If Indicated (No Culture) - Urine, Clean Catch    Lactic Acid, Plasma    CBC Auto Differential    NPO Diet NPO Type: Strict NPO    Undress & Gown    Insert Peripheral IV    CBC & Differential    Green Top (Gel)    Lavender Top    Gold Top - SST    Light Blue " Top       Medications Given in the Emergency Department:  Medications   sodium chloride 0.9 % flush 10 mL (has no administration in time range)   furosemide (LASIX) injection 60 mg (has no administration in time range)   azithromycin (ZITHROMAX) tablet 500 mg (has no administration in time range)   ketorolac (TORADOL) injection 15 mg (15 mg Intravenous Given 3/2/24 0740)   acetaminophen (TYLENOL) tablet 650 mg (650 mg Oral Given 3/2/24 0740)   iopamidol (ISOVUE-370) 76 % injection 100 mL (100 mL Intravenous Given 3/2/24 0830)        ED Course:         Labs:    Lab Results (last 24 hours)       Procedure Component Value Units Date/Time    COVID-19, FLU A/B, RSV PCR 1 HR TAT - Swab, Nasopharynx [[id number]]  (Normal) Collected: 03/02/24 0726    Specimen: Swab from Nasopharynx Updated: 03/02/24 0833     COVID19 Not Detected     Influenza A PCR Not Detected     Influenza B PCR Not Detected     RSV, PCR Not Detected    Narrative:      Fact sheet for providers: https://www.fda.gov/media/248954/download    Fact sheet for patients: https://www.fda.gov/media/410820/download    Test performed by PCR.    Rapid Strep A Screen - Swab, Throat [[id number]]  (Normal) Collected: 03/02/24 0726    Specimen: Swab from Throat Updated: 03/02/24 0806     Strep A Ag Negative    Beta Strep Culture, Throat - Swab, Throat [[id number]] Collected: 03/02/24 0726    Specimen: Swab from Throat Updated: 03/02/24 0804    CBC & Differential [[id number]]  (Abnormal) Collected: 03/02/24 0734    Specimen: Blood Updated: 03/02/24 0742    Narrative:      The following orders were created for panel order CBC & Differential.  Procedure                               Abnormality         Status                     ---------                               -----------         ------                     CBC Auto Differential[[id number]]        Abnormal            Final result                 Please view results for these tests on the individual orders.    Comprehensive  Metabolic Panel [[id number]]  (Abnormal) Collected: 03/02/24 0734    Specimen: Blood Updated: 03/02/24 0811     Glucose 110 mg/dL      BUN 17 mg/dL      Creatinine 0.95 mg/dL      Sodium 136 mmol/L      Potassium 4.4 mmol/L      Chloride 100 mmol/L      CO2 25.5 mmol/L      Calcium 9.2 mg/dL      Total Protein 8.7 g/dL      Albumin 4.1 g/dL      ALT (SGPT) 11 U/L      AST (SGOT) 16 U/L      Alkaline Phosphatase 108 U/L      Total Bilirubin 0.3 mg/dL      Globulin 4.6 gm/dL      A/G Ratio 0.9 g/dL      BUN/Creatinine Ratio 17.9     Anion Gap 10.5 mmol/L      eGFR 86.6 mL/min/1.73     Narrative:      GFR Normal >60  Chronic Kidney Disease <60  Kidney Failure <15      Lipase [[id number]]  (Normal) Collected: 03/02/24 0734    Specimen: Blood Updated: 03/02/24 0811     Lipase 24 U/L     Lactic Acid, Plasma [[id number]]  (Normal) Collected: 03/02/24 0734    Specimen: Blood Updated: 03/02/24 0810     Lactate 1.0 mmol/L     CBC Auto Differential [[id number]]  (Abnormal) Collected: 03/02/24 0734    Specimen: Blood Updated: 03/02/24 0742     WBC 5.43 10*3/mm3      RBC 3.97 10*6/mm3      Hemoglobin 11.8 g/dL      Hematocrit 35.6 %      MCV 89.7 fL      MCH 29.7 pg      MCHC 33.1 g/dL      RDW 14.4 %      RDW-SD 47.7 fl      MPV 8.9 fL      Platelets 292 10*3/mm3      Neutrophil % 58.5 %      Lymphocyte % 30.4 %      Monocyte % 8.1 %      Eosinophil % 2.0 %      Basophil % 0.6 %      Immature Grans % 0.4 %      Neutrophils, Absolute 3.18 10*3/mm3      Lymphocytes, Absolute 1.65 10*3/mm3      Monocytes, Absolute 0.44 10*3/mm3      Eosinophils, Absolute 0.11 10*3/mm3      Basophils, Absolute 0.03 10*3/mm3      Immature Grans, Absolute 0.02 10*3/mm3      nRBC 0.0 /100 WBC     Urinalysis With Microscopic If Indicated (No Culture) - Urine, Clean Catch [[id number]]  (Normal) Collected: 03/02/24 0948    Specimen: Urine, Clean Catch Updated: 03/02/24 1001     Color, UA Yellow     Appearance, UA Clear     pH, UA 5.5     Specific Gravity, UA  1.012     Glucose, UA Negative     Ketones, UA Negative     Bilirubin, UA Negative     Blood, UA Negative     Protein, UA Negative     Leuk Esterase, UA Negative     Nitrite, UA Negative     Urobilinogen, UA 0.2 E.U./dL    Narrative:      Urine microscopic not indicated.             Imaging:    CT Abdomen Pelvis With Contrast    Result Date: 3/2/2024  PROCEDURE: CT ABDOMEN PELVIS W CONTRAST  COMPARISON: [facility], CT, CT ABDOMEN PELVIS WO CONTRAST, 3/26/2022, 21:37.  INDICATIONS: Eval right lower quadrant pain,  TECHNIQUE: After obtaining the patient's consent, CT images were created with non-ionic intravenous contrast material.   PROTOCOL:   Standard imaging protocol performed    RADIATION:   DLP: 468.2 mGy*cm   Automated exposure control was utilized to minimize radiation dose. CONTRAST: 100 cc Isovue 370 I.V. LABS:   eGFR: >60 ml/min/1.73m2  FINDINGS:    Lung bases:  Mild bronchiectasis noted posteriorly at the lung bases bilaterally.  Ground-glass opacity posteriorly lower lobes right greater than left.  Dependent opacities compatible with a trace amount of pleural fluid and pleural thickening noted.  Overall findings demonstrate improved aeration compared to the previous exam.  No free air is noted below the diaphragm.  Organs:  Motion limited imaging of the organs demonstrate a grossly unremarkable appearance of the gallbladder.  Limited imaging of the liver, spleen, pancreas, right kidney and adrenal glands are grossly unremarkable in appearance.  Low-attenuation lesion left kidney measuring approximately 1.3 cm is fluid density compatible with a cyst.  No evidence of obstructive uropathy  GI tract:  There is a small hiatal hernia.  Motion limited imaging of the stomach is grossly unremarkable in appearance.  Limited imaging of the small bowel demonstrates no evidence of obstruction.  No focal wall thickening or inflammatory changes noted.  Nonspecific air and fluid-filled loops are noted  without significant distension.  No suspicious mesenteric adenopathy or fluid collection is noted.  Evaluation of the ileocecal valve is somewhat limited without gross acute abnormality.  There is diverticulosis with no acute inflammatory change to suggest acute diverticulitis.  Appendix is visualized and appears within normal limits.  Pelvis:  Urinary bladder is grossly unremarkable in appearance.  Prostate and pelvic structures demonstrate no acute abnormality.  No suspicious pelvic adenopathy or fluid collection noted  Retroperitoneum:  The aorta is normal in caliber.  There is no suspicious retroperitoneal adenopathy  Bones and soft tissues:  Multilevel degenerative changes noted of the spine without acute osseous abnormality.        1. Nonspecific air and fluid-filled loops of small bowel without evidence of significant inflammatory changes distension.  Findings may represent normal variation versus mild underlying enteritis. 2. Appendix is visualized and appears within normal limits 3. Minimal dependent ground-glass opacities at the lung bases which are favored to represent atelectasis.  Developing pneumonitis thought to be less likely but cannot be excluded     RAFAEL SMITH MD       Electronically Signed and Approved By: RAFAEL SMITH MD on 3/02/2024 at 9:27             XR Chest 1 View    Result Date: 3/2/2024  PROCEDURE: XR CHEST 1 VW  COMPARISON: [facility], CR, XR CHEST PA AND LATERAL, 5/23/2023, 12:50.  INDICATIONS: cough  FINDINGS:  Heart size is borderline enlarged similar to the prior study.  There is mild prominence of the perihilar markings and mild pulmonary vascular congestion bilaterally.  No focal consolidation noted.  Minimal blunting right costophrenic angle.  Osseous structures are unremarkable        1. Borderline cardiomegaly 2. Prominence of the perihilar markings which may represent mild pulmonary vascular congestion and/or peribronchial predominance which can be seen  with underlying viral in atypical infection.       RAFAEL SMITH MD       Electronically Signed and Approved By: RAFAEL SMITH MD on 3/02/2024 at 7:47                Differential Diagnosis and Discussion:    Abdominal Pain: Based on the patient's signs and symptoms, I considered abdominal aortic aneurysm, small bowel obstruction, pancreatitis, acute cholecystitis, acute appendecitis, peptic ulcer disease, gastritis, colitis, endocrine disorders, irritable bowel syndrome and other differential diagnosis an etiology of the patient's abdominal pain.  Cough: Differential diagnosis includes but is not limited to pneumonia, acute bronchitis, upper respiratory infection, ACE inhibitor use, allergic reaction, epiglottitis, seasonal allergies, chemical irritants, exercise-induced asthma, viral syndrome.  Sore Throat: Differential diagnosis includes but is not limited to bacterial infection, viral infection, inhaled irritants, sinus drainage, thyroiditis, epiglottitis, and retropharyngeal abscess.    All labs were reviewed and interpreted by me.  All X-rays impressions were independently interpreted by me.  CT scan radiology impression was interpreted by me.    MDM     Amount and/or Complexity of Data Reviewed  Clinical lab tests: reviewed  Tests in the radiology section of CPT®: reviewed           This patient is a 69-year-old male presenting with 2 different complaints.    The first complaint is sore throat and cough and congestion and sweats and chills for the past couple days.    We are getting a chest x-ray to rule out pneumonia and swabbing him for flu and COVID-19 and checking basic lab work here.    Secondly, he has had some intermittent right groin pain for the past couple weeks.    He is tender on exam in the right lower quadrant of the abdomen but also inferiorly but I do not detect any obvious hernia on Valsalva maneuver or lymphadenopathy.    I am getting CT imaging of the abdomen pelvis with contrast to  evaluate this further.      CT ended up showing no acute findings in the abdomen other than some mild enteritis but no appendicitis or bowel obstruction or perforation seen.  No signs of a hernia seen.    In addition there was some abnormal groundglass opacities noted at the lung bases and chest x-ray also shows some mild pulmonary vascular congestion and he does have a history of CHF.    I am diuresing him with a dose of Lasix in the ED and we will transition him to oral Lasix this week and have him follow-up with his doctor as an outpatient, as he is oxygenating 100% and not looking to meet indication for hospital admission today.              Patient Care Considerations:          Consultants/Shared Management Plan:        Social Determinants of Health:    Patient is independent, reliable, and has access to care.       Disposition and Care Coordination:    Discharged: The patient is suitable and stable for discharge with no need for consideration of admission.    I have explained the patient´s condition, diagnoses and treatment plan based on the information available to me at this time. I have answered questions and addressed any concerns. The patient has a good  understanding of the patient´s diagnosis, condition, and treatment plan as can be expected at this point. The vital signs have been stable. The patient´s condition is stable and appropriate for discharge from the emergency department.      The patient will pursue further outpatient evaluation with the primary care physician or other designated or consulting physician as outlined in the discharge instructions. They are agreeable to this plan of care and follow-up instructions have been explained in detail. The patient has received these instructions in written format and has expressed an understanding of the discharge instructions. The patient is aware that any significant change in condition or worsening of symptoms should prompt an immediate return to  this or the closest emergency department or call to 911.  I have explained discharge medications and the need for follow up with the patient/caretakers. This was also printed in the discharge instructions. Patient was discharged with the following medications and follow up:      Medication List        New Prescriptions      azithromycin 250 MG tablet  Commonly known as: Zithromax Z-Nguyễn  Take 2 tablets by mouth on day 1, then 1 tablet daily on days 2-5     furosemide 20 MG tablet  Commonly known as: LASIX  Take 2 tablets by mouth Daily.               Where to Get Your Medications        These medications were sent to [id number] - [place], [place] - [address] [facility] OF [address] & [place] - [phone]  - [phone] FX  [address], [place] [address]      Phone: [phone]   azithromycin 250 MG tablet  furosemide 20 MG tablet      Shelly Mejia MD  [address]  [place] [address] [phone]    Call in 2 days  for a follow-up appointment      Final diagnoses:   Acute pharyngitis, unspecified etiology   Abdominal pain, acute, right lower quadrant   Enteritis   Acute on chronic congestive heart failure, unspecified heart failure type        ED Disposition       ED Disposition   Discharge    Condition   Stable    Comment   --               This medical record created using voice recognition software.             Ajit Montague MD  03/02/24 7712

## 2024-03-04 LAB — BACTERIA SPEC AEROBE CULT: NORMAL

## 2024-08-05 DIAGNOSIS — Z79.899 ENCOUNTER FOR LONG-TERM (CURRENT) USE OF OTHER MEDICATIONS: Primary | ICD-10-CM

## 2025-01-30 NOTE — PROGRESS NOTES
[facility]     Progress Note    Patient Name: Hai Gold  : 1955  MRN: [id number]  Primary Care Physician:  Shelly Mejia MD  Date of admission: 2022      Subjective   Brief summary.  Admitted for generalized weakness      HPI:  And having issues with heart rate, beta-blocker adjusted, cardiologist seen, patient feeling better, occasional nausea    Review of Systems     Fatigue and weakness participating in therapy  Poor p.o. intake      Objective     Vitals:   Temp:  [97.3 °F (36.3 °C)-98.2 °F (36.8 °C)] 97.3 °F (36.3 °C)  Heart Rate:  [66-67] 66  Resp:  [14-16] 14  BP: (100-140)/(52-73) 131/57    Physical Exam :     Elderly male not in acute distress  Heart regular and lungs clear  Extremities no edema      Result Review:  I have personally reviewed the results from the time of this admission to 2022 11:25 EDT and agree with these findings:  []  Laboratory  []  Microbiology  []  Radiology  []  EKG/Telemetry   []  Cardiology/Vascular   []  Pathology  []  Old records  []  Other:           Assessment / Plan       Active Hospital Problems:  Active Hospital Problems    Diagnosis    • Dizziness    • Bradycardia    • Generalized weakness    • Dysphagia        Plan:   Stable, bradycardia improving.  On low-dose beta-blocker, followed by Dr. Matheus Argueta.  Continue PT OT       DVT prophylaxis:  Medical and mechanical DVT prophylaxis orders are present.    CODE STATUS:   Code Status (Patient has no pulse and is not breathing): CPR (Attempt to Resuscitate)  Medical Interventions (Patient has pulse or is breathing): Full Support  Release to patient: Routine Release            Electronically signed by Lazaro May MD, 22, 11:25 AM EDT.            Detail Level: Detailed Depth Of Biopsy: dermis Was A Bandage Applied: Yes Size Of Lesion In Cm: 0.1 X Size Of Lesion In Cm: 0 Biopsy Type: H and E Biopsy Method: Personna blade Anesthesia Type: 1% lidocaine with epinephrine Anesthesia Volume In Cc: 1 Hemostasis: Aluminum Chloride Wound Care: Vaseline Dressing: Band-Aid Destruction After The Procedure: No Type Of Destruction Used: Curettage Curettage Text: The wound bed was treated with curettage after the biopsy was performed. Cryotherapy Text: The wound bed was treated with cryotherapy after the biopsy was performed. Electrodesiccation Text: The wound bed was treated with electrodesiccation after the biopsy was performed. Electrodesiccation And Curettage Text: The wound bed was treated with electrodesiccation and curettage after the biopsy was performed. Silver Nitrate Text: The wound bed was treated with silver nitrate after the biopsy was performed. Lab: -[id number] Lab Facility: [id number] Notes (To The Dermatopathologist): Please check margins Medical Necessity Information: It is in your best interest to select a reason for this procedure from the list below. All of these items fulfill various CMS LCD requirements except the new and changing color options. Consent: The provider's intent is to obtain a tissue sample solely for diagnostic purposes.  Written consent was obtained and risks were reviewed including but not limited to scarring, infection, bleeding, scabbing, incomplete removal, nerve damage and allergy to anesthesia. Post-Care Instructions: I reviewed with the patient in detail post-care instructions. Patient is to keep the biopsy site dry overnight, and then apply bacitracin or Vaseline twice daily until healed. Call the office for any purulent drainage, spreading redness, increasing pain or fevers. Notification Instructions: Patient will be notified of biopsy results. However, patient instructed to call the office if not contacted within 2 weeks. Billing Type: Third-Party Bill Information: Selecting Yes will display possible errors in your note based on the variables you have selected. This validation is only offered as a suggestion for you. PLEASE NOTE THAT THE VALIDATION TEXT WILL BE REMOVED WHEN YOU FINALIZE YOUR NOTE. IF YOU WANT TO FAX A PRELIMINARY NOTE YOU WILL NEED TO TOGGLE THIS TO 'NO' IF YOU DO NOT WANT IT IN YOUR FAXED NOTE. Size Of Lesion In Cm: 0.2 Size Of Lesion In Cm: 0.4

## (undated) DEVICE — EGD OR ERCP KIT: Brand: MEDLINE INDUSTRIES, INC.

## (undated) DEVICE — THE SINGLE USE ETRAP – POLYP TRAP IS USED FOR SUCTION RETRIEVAL OF ENDOSCOPICALLY REMOVED POLYPS.: Brand: ETRAP

## (undated) DEVICE — COLON KIT: Brand: MEDLINE INDUSTRIES, INC.

## (undated) DEVICE — Device: Brand: DEFENDO AIR/WATER/SUCTION AND BIOPSY VALVE

## (undated) DEVICE — SOL IRRG H2O PL/BG 1000ML STRL

## (undated) DEVICE — SINGLE-USE BIOPSY FORCEPS: Brand: RADIAL JAW 4

## (undated) DEVICE — SNAR E/S POLYP SNAREMASTER OVL/10MM 2.8X2300MM YEL